# Patient Record
Sex: MALE | Race: WHITE | NOT HISPANIC OR LATINO | Employment: OTHER | URBAN - METROPOLITAN AREA
[De-identification: names, ages, dates, MRNs, and addresses within clinical notes are randomized per-mention and may not be internally consistent; named-entity substitution may affect disease eponyms.]

---

## 2022-07-14 ENCOUNTER — HOSPITAL ENCOUNTER (INPATIENT)
Facility: HOSPITAL | Age: 82
LOS: 4 days | Discharge: NURSING, REHAB OR LONG-TERM CARE | DRG: 176 | End: 2022-07-18
Attending: EMERGENCY MEDICINE | Admitting: INTERNAL MEDICINE
Payer: MEDICARE

## 2022-07-14 DIAGNOSIS — R79.89 ELEVATED TROPONIN: ICD-10-CM

## 2022-07-14 DIAGNOSIS — R09.02 HYPOXIA: ICD-10-CM

## 2022-07-14 DIAGNOSIS — R07.9 CHEST PAIN: ICD-10-CM

## 2022-07-14 DIAGNOSIS — I26.99 BILATERAL PULMONARY EMBOLISM: ICD-10-CM

## 2022-07-14 DIAGNOSIS — I49.9 DYSRHYTHMIA: ICD-10-CM

## 2022-07-14 DIAGNOSIS — R00.0 TACHYCARDIA WITH GREATER THAN 160 BEATS PER MINUTE: ICD-10-CM

## 2022-07-14 DIAGNOSIS — I26.99 ACUTE PULMONARY EMBOLISM WITHOUT ACUTE COR PULMONALE, UNSPECIFIED PULMONARY EMBOLISM TYPE: Primary | ICD-10-CM

## 2022-07-14 DIAGNOSIS — R00.0 TACHYCARDIA: ICD-10-CM

## 2022-07-14 PROBLEM — N40.0 BPH (BENIGN PROSTATIC HYPERPLASIA): Status: ACTIVE | Noted: 2022-07-14

## 2022-07-14 PROBLEM — I10 ESSENTIAL HYPERTENSION: Status: ACTIVE | Noted: 2022-07-14

## 2022-07-14 PROBLEM — I47.10 SVT (SUPRAVENTRICULAR TACHYCARDIA): Status: ACTIVE | Noted: 2022-07-14

## 2022-07-14 PROBLEM — F02.80 ALZHEIMER'S DEMENTIA: Status: ACTIVE | Noted: 2022-07-14

## 2022-07-14 PROBLEM — G30.9 ALZHEIMER'S DEMENTIA: Status: ACTIVE | Noted: 2022-07-14

## 2022-07-14 LAB
ALBUMIN SERPL BCP-MCNC: 3 G/DL (ref 3.5–5.2)
ALP SERPL-CCNC: 83 U/L (ref 55–135)
ALT SERPL W/O P-5'-P-CCNC: 20 U/L (ref 10–44)
ANION GAP SERPL CALC-SCNC: 12 MMOL/L (ref 8–16)
APTT BLDCRRT: 25.1 SEC (ref 21–32)
AST SERPL-CCNC: 21 U/L (ref 10–40)
BASOPHILS # BLD AUTO: 0.07 K/UL (ref 0–0.2)
BASOPHILS # BLD AUTO: 0.07 K/UL (ref 0–0.2)
BASOPHILS NFR BLD: 0.5 % (ref 0–1.9)
BASOPHILS NFR BLD: 0.5 % (ref 0–1.9)
BILIRUB SERPL-MCNC: 1.3 MG/DL (ref 0.1–1)
BILIRUB UR QL STRIP: NEGATIVE
BNP SERPL-MCNC: 151 PG/ML (ref 0–99)
BUN SERPL-MCNC: 11 MG/DL (ref 8–23)
CALCIUM SERPL-MCNC: 9.3 MG/DL (ref 8.7–10.5)
CHLORIDE SERPL-SCNC: 91 MMOL/L (ref 95–110)
CLARITY UR: CLEAR
CO2 SERPL-SCNC: 27 MMOL/L (ref 23–29)
COLOR UR: YELLOW
CREAT SERPL-MCNC: 0.7 MG/DL (ref 0.5–1.4)
CTP QC/QA: YES
DIFFERENTIAL METHOD: ABNORMAL
DIFFERENTIAL METHOD: ABNORMAL
EOSINOPHIL # BLD AUTO: 0.3 K/UL (ref 0–0.5)
EOSINOPHIL # BLD AUTO: 0.3 K/UL (ref 0–0.5)
EOSINOPHIL NFR BLD: 2.1 % (ref 0–8)
EOSINOPHIL NFR BLD: 2.2 % (ref 0–8)
ERYTHROCYTE [DISTWIDTH] IN BLOOD BY AUTOMATED COUNT: 13.8 % (ref 11.5–14.5)
ERYTHROCYTE [DISTWIDTH] IN BLOOD BY AUTOMATED COUNT: 14.1 % (ref 11.5–14.5)
EST. GFR  (AFRICAN AMERICAN): >60 ML/MIN/1.73 M^2
EST. GFR  (NON AFRICAN AMERICAN): >60 ML/MIN/1.73 M^2
GLUCOSE SERPL-MCNC: 110 MG/DL (ref 70–110)
GLUCOSE UR QL STRIP: NEGATIVE
HCT VFR BLD AUTO: 38.1 % (ref 40–54)
HCT VFR BLD AUTO: 40.8 % (ref 40–54)
HGB BLD-MCNC: 12.9 G/DL (ref 14–18)
HGB BLD-MCNC: 13.7 G/DL (ref 14–18)
HGB UR QL STRIP: NEGATIVE
IMM GRANULOCYTES # BLD AUTO: 0.05 K/UL (ref 0–0.04)
IMM GRANULOCYTES # BLD AUTO: 0.07 K/UL (ref 0–0.04)
IMM GRANULOCYTES NFR BLD AUTO: 0.4 % (ref 0–0.5)
IMM GRANULOCYTES NFR BLD AUTO: 0.5 % (ref 0–0.5)
INFLUENZA A, MOLECULAR: NEGATIVE
INFLUENZA B, MOLECULAR: NEGATIVE
INR PPP: 1.1 (ref 0.8–1.2)
KETONES UR QL STRIP: NEGATIVE
LACTATE SERPL-SCNC: 0.9 MMOL/L (ref 0.5–2.2)
LACTATE SERPL-SCNC: 1 MMOL/L (ref 0.5–2.2)
LEUKOCYTE ESTERASE UR QL STRIP: ABNORMAL
LYMPHOCYTES # BLD AUTO: 1 K/UL (ref 1–4.8)
LYMPHOCYTES # BLD AUTO: 1 K/UL (ref 1–4.8)
LYMPHOCYTES NFR BLD: 7.5 % (ref 18–48)
LYMPHOCYTES NFR BLD: 7.9 % (ref 18–48)
MCH RBC QN AUTO: 29.9 PG (ref 27–31)
MCH RBC QN AUTO: 30.1 PG (ref 27–31)
MCHC RBC AUTO-ENTMCNC: 33.6 G/DL (ref 32–36)
MCHC RBC AUTO-ENTMCNC: 33.9 G/DL (ref 32–36)
MCV RBC AUTO: 89 FL (ref 82–98)
MCV RBC AUTO: 89 FL (ref 82–98)
MICROSCOPIC COMMENT: ABNORMAL
MONOCYTES # BLD AUTO: 1.1 K/UL (ref 0.3–1)
MONOCYTES # BLD AUTO: 1.2 K/UL (ref 0.3–1)
MONOCYTES NFR BLD: 8.6 % (ref 4–15)
MONOCYTES NFR BLD: 9.5 % (ref 4–15)
NEUTROPHILS # BLD AUTO: 10.3 K/UL (ref 1.8–7.7)
NEUTROPHILS # BLD AUTO: 10.6 K/UL (ref 1.8–7.7)
NEUTROPHILS NFR BLD: 79.5 % (ref 38–73)
NEUTROPHILS NFR BLD: 80.8 % (ref 38–73)
NITRITE UR QL STRIP: NEGATIVE
NRBC BLD-RTO: 0 /100 WBC
NRBC BLD-RTO: 0 /100 WBC
PH UR STRIP: 7 [PH] (ref 5–8)
PLATELET # BLD AUTO: 204 K/UL (ref 150–450)
PLATELET # BLD AUTO: 206 K/UL (ref 150–450)
PMV BLD AUTO: 10.6 FL (ref 9.2–12.9)
PMV BLD AUTO: 10.7 FL (ref 9.2–12.9)
POTASSIUM SERPL-SCNC: 4.7 MMOL/L (ref 3.5–5.1)
PROCALCITONIN SERPL IA-MCNC: 0.11 NG/ML
PROT SERPL-MCNC: 6.6 G/DL (ref 6–8.4)
PROT UR QL STRIP: ABNORMAL
PROTHROMBIN TIME: 11.6 SEC (ref 9–12.5)
RBC # BLD AUTO: 4.28 M/UL (ref 4.6–6.2)
RBC # BLD AUTO: 4.58 M/UL (ref 4.6–6.2)
RBC #/AREA URNS HPF: 2 /HPF (ref 0–4)
SARS-COV-2 RDRP RESP QL NAA+PROBE: NEGATIVE
SODIUM SERPL-SCNC: 130 MMOL/L (ref 136–145)
SP GR UR STRIP: 1.02 (ref 1–1.03)
SPECIMEN SOURCE: NORMAL
TROPONIN I SERPL DL<=0.01 NG/ML-MCNC: 0.07 NG/ML (ref 0–0.03)
TROPONIN I SERPL DL<=0.01 NG/ML-MCNC: 0.1 NG/ML (ref 0–0.03)
URN SPEC COLLECT METH UR: ABNORMAL
UROBILINOGEN UR STRIP-ACNC: ABNORMAL EU/DL
WBC # BLD AUTO: 12.98 K/UL (ref 3.9–12.7)
WBC # BLD AUTO: 13.1 K/UL (ref 3.9–12.7)
WBC #/AREA URNS HPF: 10 /HPF (ref 0–5)

## 2022-07-14 PROCEDURE — 36415 COLL VENOUS BLD VENIPUNCTURE: CPT | Performed by: NURSE PRACTITIONER

## 2022-07-14 PROCEDURE — 21400001 HC TELEMETRY ROOM

## 2022-07-14 PROCEDURE — 83605 ASSAY OF LACTIC ACID: CPT | Performed by: EMERGENCY MEDICINE

## 2022-07-14 PROCEDURE — 93010 EKG 12-LEAD: ICD-10-PCS | Mod: ,,, | Performed by: INTERNAL MEDICINE

## 2022-07-14 PROCEDURE — 80053 COMPREHEN METABOLIC PANEL: CPT | Performed by: EMERGENCY MEDICINE

## 2022-07-14 PROCEDURE — 87502 INFLUENZA DNA AMP PROBE: CPT | Performed by: EMERGENCY MEDICINE

## 2022-07-14 PROCEDURE — 27000221 HC OXYGEN, UP TO 24 HOURS

## 2022-07-14 PROCEDURE — 81000 URINALYSIS NONAUTO W/SCOPE: CPT | Performed by: EMERGENCY MEDICINE

## 2022-07-14 PROCEDURE — 84484 ASSAY OF TROPONIN QUANT: CPT | Mod: 91 | Performed by: NURSE PRACTITIONER

## 2022-07-14 PROCEDURE — 87040 BLOOD CULTURE FOR BACTERIA: CPT | Mod: 59 | Performed by: EMERGENCY MEDICINE

## 2022-07-14 PROCEDURE — 87502 INFLUENZA DNA AMP PROBE: CPT

## 2022-07-14 PROCEDURE — 85610 PROTHROMBIN TIME: CPT | Performed by: EMERGENCY MEDICINE

## 2022-07-14 PROCEDURE — 83880 ASSAY OF NATRIURETIC PEPTIDE: CPT | Performed by: EMERGENCY MEDICINE

## 2022-07-14 PROCEDURE — 25000003 PHARM REV CODE 250: Performed by: EMERGENCY MEDICINE

## 2022-07-14 PROCEDURE — 25000003 PHARM REV CODE 250: Performed by: NURSE PRACTITIONER

## 2022-07-14 PROCEDURE — U0002 COVID-19 LAB TEST NON-CDC: HCPCS | Performed by: EMERGENCY MEDICINE

## 2022-07-14 PROCEDURE — 63600175 PHARM REV CODE 636 W HCPCS: Performed by: EMERGENCY MEDICINE

## 2022-07-14 PROCEDURE — 85025 COMPLETE CBC W/AUTO DIFF WBC: CPT | Performed by: EMERGENCY MEDICINE

## 2022-07-14 PROCEDURE — 93010 ELECTROCARDIOGRAM REPORT: CPT | Mod: 76,,, | Performed by: INTERNAL MEDICINE

## 2022-07-14 PROCEDURE — 94761 N-INVAS EAR/PLS OXIMETRY MLT: CPT

## 2022-07-14 PROCEDURE — 84145 PROCALCITONIN (PCT): CPT | Performed by: EMERGENCY MEDICINE

## 2022-07-14 PROCEDURE — 36415 COLL VENOUS BLD VENIPUNCTURE: CPT | Performed by: EMERGENCY MEDICINE

## 2022-07-14 PROCEDURE — 96374 THER/PROPH/DIAG INJ IV PUSH: CPT

## 2022-07-14 PROCEDURE — 99291 CRITICAL CARE FIRST HOUR: CPT | Mod: 25

## 2022-07-14 PROCEDURE — 93005 ELECTROCARDIOGRAM TRACING: CPT

## 2022-07-14 PROCEDURE — 85025 COMPLETE CBC W/AUTO DIFF WBC: CPT | Mod: 91 | Performed by: EMERGENCY MEDICINE

## 2022-07-14 PROCEDURE — 99900035 HC TECH TIME PER 15 MIN (STAT)

## 2022-07-14 PROCEDURE — 25500020 PHARM REV CODE 255: Performed by: EMERGENCY MEDICINE

## 2022-07-14 PROCEDURE — A4216 STERILE WATER/SALINE, 10 ML: HCPCS | Performed by: NURSE PRACTITIONER

## 2022-07-14 PROCEDURE — 84484 ASSAY OF TROPONIN QUANT: CPT | Performed by: EMERGENCY MEDICINE

## 2022-07-14 PROCEDURE — 85730 THROMBOPLASTIN TIME PARTIAL: CPT | Performed by: EMERGENCY MEDICINE

## 2022-07-14 PROCEDURE — 93010 ELECTROCARDIOGRAM REPORT: CPT | Mod: ,,, | Performed by: INTERNAL MEDICINE

## 2022-07-14 RX ORDER — FINASTERIDE 5 MG/1
5 TABLET, FILM COATED ORAL DAILY
Status: DISCONTINUED | OUTPATIENT
Start: 2022-07-15 | End: 2022-07-18 | Stop reason: HOSPADM

## 2022-07-14 RX ORDER — ATORVASTATIN CALCIUM 10 MG/1
10 TABLET, FILM COATED ORAL NIGHTLY
COMMUNITY
Start: 2022-06-23

## 2022-07-14 RX ORDER — FOLIC ACID 1 MG/1
1 TABLET ORAL DAILY
COMMUNITY
Start: 2022-06-22

## 2022-07-14 RX ORDER — SIMETHICONE 80 MG
1 TABLET,CHEWABLE ORAL 4 TIMES DAILY PRN
Status: DISCONTINUED | OUTPATIENT
Start: 2022-07-14 | End: 2022-07-18 | Stop reason: HOSPADM

## 2022-07-14 RX ORDER — FINASTERIDE 5 MG/1
5 TABLET, FILM COATED ORAL DAILY
COMMUNITY
Start: 2022-06-23

## 2022-07-14 RX ORDER — DONEPEZIL HYDROCHLORIDE 5 MG/1
10 TABLET, FILM COATED ORAL NIGHTLY
Status: DISCONTINUED | OUTPATIENT
Start: 2022-07-14 | End: 2022-07-18

## 2022-07-14 RX ORDER — DOCUSATE SODIUM 100 MG/1
100 CAPSULE, LIQUID FILLED ORAL 2 TIMES DAILY
COMMUNITY

## 2022-07-14 RX ORDER — DONEPEZIL HYDROCHLORIDE 10 MG/1
10 TABLET, FILM COATED ORAL NIGHTLY
Status: ON HOLD | COMMUNITY
Start: 2022-04-25 | End: 2022-07-14

## 2022-07-14 RX ORDER — MEMANTINE HYDROCHLORIDE 10 MG/1
10 TABLET ORAL 2 TIMES DAILY
Status: ON HOLD | COMMUNITY
Start: 2022-02-22 | End: 2022-07-14

## 2022-07-14 RX ORDER — TAMSULOSIN HYDROCHLORIDE 0.4 MG/1
0.4 CAPSULE ORAL DAILY
Status: DISCONTINUED | OUTPATIENT
Start: 2022-07-15 | End: 2022-07-17

## 2022-07-14 RX ORDER — POLYETHYLENE GLYCOL 3350 17 G/17G
17 POWDER, FOR SOLUTION ORAL DAILY
COMMUNITY

## 2022-07-14 RX ORDER — NYSTATIN 100000 U/G
OINTMENT TOPICAL 2 TIMES DAILY
COMMUNITY
Start: 2022-07-07 | End: 2022-07-30

## 2022-07-14 RX ORDER — ADHESIVE BANDAGE
20 BANDAGE TOPICAL DAILY PRN
COMMUNITY

## 2022-07-14 RX ORDER — MAG HYDROX/ALUMINUM HYD/SIMETH 200-200-20
30 SUSPENSION, ORAL (FINAL DOSE FORM) ORAL 4 TIMES DAILY PRN
Status: DISCONTINUED | OUTPATIENT
Start: 2022-07-14 | End: 2022-07-18 | Stop reason: HOSPADM

## 2022-07-14 RX ORDER — OXYCODONE HYDROCHLORIDE 5 MG/1
5 TABLET ORAL EVERY 6 HOURS PRN
Status: DISCONTINUED | OUTPATIENT
Start: 2022-07-14 | End: 2022-07-18 | Stop reason: HOSPADM

## 2022-07-14 RX ORDER — MORPHINE SULFATE 4 MG/ML
4 INJECTION, SOLUTION INTRAMUSCULAR; INTRAVENOUS EVERY 4 HOURS PRN
Status: DISCONTINUED | OUTPATIENT
Start: 2022-07-14 | End: 2022-07-18 | Stop reason: HOSPADM

## 2022-07-14 RX ORDER — POLYETHYLENE GLYCOL 3350 17 G/17G
17 POWDER, FOR SOLUTION ORAL DAILY
Status: DISCONTINUED | OUTPATIENT
Start: 2022-07-15 | End: 2022-07-16

## 2022-07-14 RX ORDER — ONDANSETRON 8 MG/1
8 TABLET, ORALLY DISINTEGRATING ORAL EVERY 8 HOURS PRN
Status: DISCONTINUED | OUTPATIENT
Start: 2022-07-14 | End: 2022-07-18 | Stop reason: HOSPADM

## 2022-07-14 RX ORDER — DILTIAZEM HYDROCHLORIDE 5 MG/ML
20 INJECTION INTRAVENOUS
Status: COMPLETED | OUTPATIENT
Start: 2022-07-14 | End: 2022-07-14

## 2022-07-14 RX ORDER — HEPARIN SODIUM,PORCINE/D5W 25000/250
0-40 INTRAVENOUS SOLUTION INTRAVENOUS CONTINUOUS
Status: DISCONTINUED | OUTPATIENT
Start: 2022-07-14 | End: 2022-07-15

## 2022-07-14 RX ORDER — ACETAMINOPHEN 325 MG/1
650 TABLET ORAL EVERY 8 HOURS PRN
Status: DISCONTINUED | OUTPATIENT
Start: 2022-07-14 | End: 2022-07-18 | Stop reason: HOSPADM

## 2022-07-14 RX ORDER — METOPROLOL TARTRATE 25 MG/1
12.5 TABLET ORAL 2 TIMES DAILY
Status: DISCONTINUED | OUTPATIENT
Start: 2022-07-14 | End: 2022-07-15

## 2022-07-14 RX ORDER — FOLIC ACID 1 MG/1
1 TABLET ORAL DAILY
Status: DISCONTINUED | OUTPATIENT
Start: 2022-07-15 | End: 2022-07-18 | Stop reason: HOSPADM

## 2022-07-14 RX ORDER — ACETAMINOPHEN 325 MG/1
650 TABLET ORAL EVERY 4 HOURS PRN
Status: DISCONTINUED | OUTPATIENT
Start: 2022-07-14 | End: 2022-07-18 | Stop reason: HOSPADM

## 2022-07-14 RX ORDER — TAMSULOSIN HYDROCHLORIDE 0.4 MG/1
0.4 CAPSULE ORAL DAILY
COMMUNITY
Start: 2022-06-23 | End: 2022-07-30

## 2022-07-14 RX ORDER — METOPROLOL TARTRATE 25 MG/1
12.5 TABLET, FILM COATED ORAL 2 TIMES DAILY
Status: ON HOLD | COMMUNITY
End: 2022-08-03 | Stop reason: SDUPTHER

## 2022-07-14 RX ORDER — MEMANTINE HYDROCHLORIDE 10 MG/1
10 TABLET ORAL 2 TIMES DAILY
Status: DISCONTINUED | OUTPATIENT
Start: 2022-07-14 | End: 2022-07-18

## 2022-07-14 RX ORDER — SODIUM CHLORIDE 0.9 % (FLUSH) 0.9 %
10 SYRINGE (ML) INJECTION EVERY 8 HOURS
Status: DISCONTINUED | OUTPATIENT
Start: 2022-07-14 | End: 2022-07-18 | Stop reason: HOSPADM

## 2022-07-14 RX ORDER — DOCUSATE SODIUM 100 MG/1
100 CAPSULE, LIQUID FILLED ORAL 2 TIMES DAILY
Status: DISCONTINUED | OUTPATIENT
Start: 2022-07-14 | End: 2022-07-18 | Stop reason: HOSPADM

## 2022-07-14 RX ORDER — NALOXONE HCL 0.4 MG/ML
0.02 VIAL (ML) INJECTION
Status: DISCONTINUED | OUTPATIENT
Start: 2022-07-14 | End: 2022-07-18 | Stop reason: HOSPADM

## 2022-07-14 RX ORDER — ATORVASTATIN CALCIUM 10 MG/1
10 TABLET, FILM COATED ORAL DAILY
Status: DISCONTINUED | OUTPATIENT
Start: 2022-07-15 | End: 2022-07-18 | Stop reason: HOSPADM

## 2022-07-14 RX ORDER — PROMETHAZINE HYDROCHLORIDE 25 MG/1
25 TABLET ORAL EVERY 6 HOURS PRN
Status: DISCONTINUED | OUTPATIENT
Start: 2022-07-14 | End: 2022-07-18 | Stop reason: HOSPADM

## 2022-07-14 RX ORDER — TALC
6 POWDER (GRAM) TOPICAL NIGHTLY PRN
Status: DISCONTINUED | OUTPATIENT
Start: 2022-07-14 | End: 2022-07-18 | Stop reason: HOSPADM

## 2022-07-14 RX ADMIN — DOCUSATE SODIUM 100 MG: 100 CAPSULE, LIQUID FILLED ORAL at 10:07

## 2022-07-14 RX ADMIN — IOHEXOL 100 ML: 350 INJECTION, SOLUTION INTRAVENOUS at 05:07

## 2022-07-14 RX ADMIN — Medication 10 ML: at 10:07

## 2022-07-14 RX ADMIN — METOPROLOL TARTRATE 12.5 MG: 25 TABLET, FILM COATED ORAL at 10:07

## 2022-07-14 RX ADMIN — DILTIAZEM HYDROCHLORIDE 20 MG: 5 INJECTION INTRAVENOUS at 05:07

## 2022-07-14 RX ADMIN — HEPARIN SODIUM 18 UNITS/KG/HR: 10000 INJECTION, SOLUTION INTRAVENOUS at 07:07

## 2022-07-14 NOTE — ED PROVIDER NOTES
SCRIBE #1 NOTE: I, Hortencia Preciado, am scribing for, and in the presence of, Prabhjot Joshua MD. I have scribed the HPI and ROS.     SCRIBE #2 NOTE: I, Jayson Mata, am scribing for, and in the presence of,  Denzel Sow Jr., MD. I have scribed the remaining portions of the note not scribed by Scribe #1.     History      Chief Complaint   Patient presents with    Fatigue     Pt. Presents to Ed via EMS due to having generalized  weakness for the past few days, and today home health found pt to have a low o2 stat of 82-83. Pt is 94 on 6lpm via NC        Review of patient's allergies indicates:  No Known Allergies     HPI   HPI    7/14/2022, 3:02 PM   History obtained from the patient      History of Present Illness: Riley Saba is a 82 y.o. male patient with a PMHx of Alzheimer's disease who presents to the Emergency Department for hypoxia. Per EMS, the pt's home health found the pt to have a SpO2 of 80% so they called EMS. EMS reports that the fire department put the pt on a non-rebreather and they put the pt on 4L of O2. En route to the ED, EMS reports that the pt's SpO2 increased 96% on 4L of O2. EMS also reports that the pt was intermittently hypotensive and tachycardic en route with the pt's lowest blood pressure being 94/50 and highest heart rate being in the 140s. EMS reports that they have been transporting the pt frequently in recent weeks for generalized weakness.      Arrival mode: EMS    PCP: Bj Clayton MD       Past Medical History:  No past medical history on file.    Past Surgical History:  No past surgical history on file.      Family History:  No family history on file.    Social History:  Social History     Tobacco Use    Smoking status: Not on file    Smokeless tobacco: Not on file   Substance and Sexual Activity    Alcohol use: Not on file    Drug use: Not on file    Sexual activity: Not on file       ROS   Review of Systems   Unable to perform ROS: Other (Alzhiemer's disease)      Physical Exam      Initial Vitals [07/14/22 1446]   BP Pulse Resp Temp SpO2   94/72 110 (!) 28 -- (!) 94 %      MAP       --          Physical Exam  Nursing Notes and Vital Signs Reviewed.  Constitutional: Patient is in no acute distress. Well-developed and well-nourished.  Head: Atraumatic. Normocephalic.  Eyes:  EOM intact.  No scleral icterus.  ENT: Mucous membranes are moist.  Nares clear   Neck:  Full ROM. No JVD.  Cardiovascular: Regular rate. Regular rhythm No murmurs, rubs, or gallops. Distal pulses are 2+ and symmetric  Pulmonary/Chest: No respiratory distress. Clear to auscultation bilaterally. No wheezing or rales.  Equal chest wall rise bilaterally  Abdominal: Soft and non-distended.  There is no tenderness.  No rebound, guarding, or rigidity. Good bowel sounds.  Genitourinary: No CVA tenderness.  No suprapubic tenderness  Musculoskeletal: Moves all extremities. No obvious deformities.  5 x 5 strength in all extremities   Skin: Warm and dry.  Neurological:  Alert, awake, and appropriate.  Normal speech.  No acute focal neurological deficits are appreciated.  Two through 12 intact bilaterally.  Psychiatric: Normal affect. Good eye contact. Appropriate in content.    ED Course    Critical Care    Date/Time: 7/14/2022 5:42 PM  Performed by: Denzel Sow Jr., MD  Authorized by: Denzel Sow Jr., MD   Direct patient critical care time: 15 minutes  Additional history critical care time: 5 minutes  Ordering / reviewing critical care time: 5 minutes  Documentation critical care time: 5 minutes  Consulting other physicians critical care time: 5 minutes  Total critical care time (exclusive of procedural time) : 35 minutes  Critical care time was exclusive of separately billable procedures and treating other patients and teaching time.  Critical care was necessary to treat or prevent imminent or life-threatening deterioration of the following conditions: pulmonary embolism.  Critical care was time spent  "personally by me on the following activities: blood draw for specimens, development of treatment plan with patient or surrogate, discussions with consultants, interpretation of cardiac output measurements, evaluation of patient's response to treatment, examination of patient, obtaining history from patient or surrogate, ordering and performing treatments and interventions, ordering and review of laboratory studies, ordering and review of radiographic studies, pulse oximetry, re-evaluation of patient's condition and review of old charts.        ED Vital Signs:  Vitals:    07/14/22 1446 07/14/22 1502 07/14/22 1509 07/14/22 1528   BP: 94/72   (!) 146/80   Pulse: 110 109     Resp: (!) 28      TempSrc: Oral      SpO2: (!) 94%  (!) 92% 95%   Weight:       Height:        07/14/22 1700 07/14/22 1730 07/14/22 1738 07/14/22 1743   BP: 133/85 (!) 169/88 (!) 155/82 (!) 155/82   Pulse: 104 (!) 172  (!) 169   Resp: (!) 21 (!) 31  (!) 26   TempSrc:       SpO2: 97% (!) 89%  (!) 93%   Weight:       Height:        07/14/22 1744 07/14/22 1755   BP: (!) 126/58    Pulse: (S) 101    Resp: (!) 26    TempSrc:     SpO2: (!) 92%    Weight:  73.6 kg (162 lb 4.1 oz)   Height:  6' 1" (1.854 m)       Abnormal Lab Results:  Labs Reviewed   CBC W/ AUTO DIFFERENTIAL - Abnormal; Notable for the following components:       Result Value    WBC 13.10 (*)     RBC 4.58 (*)     Hemoglobin 13.7 (*)     Gran # (ANC) 10.6 (*)     Immature Grans (Abs) 0.07 (*)     Mono # 1.1 (*)     Gran % 80.8 (*)     Lymph % 7.5 (*)     All other components within normal limits   COMPREHENSIVE METABOLIC PANEL - Abnormal; Notable for the following components:    Sodium 130 (*)     Chloride 91 (*)     Albumin 3.0 (*)     Total Bilirubin 1.3 (*)     All other components within normal limits   URINALYSIS, REFLEX TO URINE CULTURE - Abnormal; Notable for the following components:    Protein, UA Trace (*)     Urobilinogen, UA 2.0-3.0 (*)     Leukocytes, UA 2+ (*)     All other " components within normal limits    Narrative:     Specimen Source->Urine   B-TYPE NATRIURETIC PEPTIDE - Abnormal; Notable for the following components:     (*)     All other components within normal limits   TROPONIN I - Abnormal; Notable for the following components:    Troponin I 0.074 (*)     All other components within normal limits   URINALYSIS MICROSCOPIC - Abnormal; Notable for the following components:    WBC, UA 10 (*)     All other components within normal limits    Narrative:     Specimen Source->Urine   CBC W/ AUTO DIFFERENTIAL - Abnormal; Notable for the following components:    WBC 12.98 (*)     RBC 4.28 (*)     Hemoglobin 12.9 (*)     Hematocrit 38.1 (*)     Gran # (ANC) 10.3 (*)     Immature Grans (Abs) 0.05 (*)     Mono # 1.2 (*)     Gran % 79.5 (*)     Lymph % 7.9 (*)     All other components within normal limits    Narrative:     Draw baseline aPTT prior to starting the heparin bolus or  infusion  (if patient is on warfarin prior to heparin therapy)   INFLUENZA A & B BY MOLECULAR   CULTURE, BLOOD   CULTURE, BLOOD   LACTIC ACID, PLASMA   PROCALCITONIN   APTT    Narrative:     Draw baseline aPTT prior to starting the heparin bolus or  infusion  (if patient is on warfarin prior to heparin therapy)   PROTIME-INR    Narrative:     Draw baseline aPTT prior to starting the heparin bolus or  infusion  (if patient is on warfarin prior to heparin therapy)   LACTIC ACID, PLASMA   SARS-COV-2 RDRP GENE    Narrative:     This test utilizes isothermal nucleic acid amplification   technology to detect the SARS-CoV-2 RdRp nucleic acid segment.   The analytical sensitivity (limit of detection) is 125 genome   equivalents/mL.   A POSITIVE result implies infection with the SARS-CoV-2 virus;   the patient is presumed to be contagious.     A NEGATIVE result means that SARS-CoV-2 nucleic acids are not   present above the limit of detection. A NEGATIVE result should be   treated as presumptive. It does not rule  out the possibility of   COVID-19 and should not be the sole basis for treatment decisions.   If COVID-19 is strongly suspected based on clinical and exposure   history, re-testing using an alternate molecular assay should be   considered.   This test is only for use under the Food and Drug   Administration s Emergency Use Authorization (EUA).   Commercial kits are provided by [employer].   Performance characteristics of the EUA have been independently   verified by [facility] Department of   Pathology and Laboratory Medicine.   _________________________________________________________________   The authorized Fact Sheet for Healthcare Providers and the authorized Fact   Sheet for Patients of the ID NOW COVID-19 are available on the FDA   website:     https://www.fda.gov/media/806281/download  https://www.fda.gov/media/871506/download               All Lab Results:  Results for orders placed or performed during the hospital encounter of 07/14/22   Influenza A & B by Molecular    Specimen: Nasopharyngeal Swab   Result Value Ref Range    Influenza A, Molecular Negative Negative    Influenza B, Molecular Negative Negative    Flu A & B Source Nasal swab    CBC auto differential   Result Value Ref Range    WBC 13.10 (H) 3.90 - 12.70 K/uL    RBC 4.58 (L) 4.60 - 6.20 M/uL    Hemoglobin 13.7 (L) 14.0 - 18.0 g/dL    Hematocrit 40.8 40.0 - 54.0 %    MCV 89 82 - 98 fL    MCH 29.9 27.0 - 31.0 pg    MCHC 33.6 32.0 - 36.0 g/dL    RDW 14.1 11.5 - 14.5 %    Platelets 206 150 - 450 K/uL    MPV 10.6 9.2 - 12.9 fL    Immature Granulocytes 0.5 0.0 - 0.5 %    Gran # (ANC) 10.6 (H) 1.8 - 7.7 K/uL    Immature Grans (Abs) 0.07 (H) 0.00 - 0.04 K/uL    Lymph # 1.0 1.0 - 4.8 K/uL    Mono # 1.1 (H) 0.3 - 1.0 K/uL    Eos # 0.3 0.0 - 0.5 K/uL    Baso # 0.07 0.00 - 0.20 K/uL    nRBC 0 0 /100 WBC    Gran % 80.8 (H) 38.0 - 73.0 %    Lymph % 7.5 (L) 18.0 - 48.0 %    Mono % 8.6 4.0 - 15.0 %    Eosinophil % 2.1 0.0 - 8.0 %     Basophil % 0.5 0.0 - 1.9 %    Differential Method Automated    Comprehensive metabolic panel   Result Value Ref Range    Sodium 130 (L) 136 - 145 mmol/L    Potassium 4.7 3.5 - 5.1 mmol/L    Chloride 91 (L) 95 - 110 mmol/L    CO2 27 23 - 29 mmol/L    Glucose 110 70 - 110 mg/dL    BUN 11 8 - 23 mg/dL    Creatinine 0.7 0.5 - 1.4 mg/dL    Calcium 9.3 8.7 - 10.5 mg/dL    Total Protein 6.6 6.0 - 8.4 g/dL    Albumin 3.0 (L) 3.5 - 5.2 g/dL    Total Bilirubin 1.3 (H) 0.1 - 1.0 mg/dL    Alkaline Phosphatase 83 55 - 135 U/L    AST 21 10 - 40 U/L    ALT 20 10 - 44 U/L    Anion Gap 12 8 - 16 mmol/L    eGFR if African American >60 >60 mL/min/1.73 m^2    eGFR if non African American >60 >60 mL/min/1.73 m^2   Lactic acid, plasma #1   Result Value Ref Range    Lactate (Lactic Acid) 0.9 0.5 - 2.2 mmol/L   Urinalysis, Reflex to Urine Culture Urine, Clean Catch    Specimen: Urine   Result Value Ref Range    Specimen UA Urine, Catheterized     Color, UA Yellow Yellow, Straw, Yamilet    Appearance, UA Clear Clear    pH, UA 7.0 5.0 - 8.0    Specific Gravity, UA 1.020 1.005 - 1.030    Protein, UA Trace (A) Negative    Glucose, UA Negative Negative    Ketones, UA Negative Negative    Bilirubin (UA) Negative Negative    Occult Blood UA Negative Negative    Nitrite, UA Negative Negative    Urobilinogen, UA 2.0-3.0 (A) <2.0 EU/dL    Leukocytes, UA 2+ (A) Negative   Brain natriuretic peptide   Result Value Ref Range     (H) 0 - 99 pg/mL   Troponin I   Result Value Ref Range    Troponin I 0.074 (H) 0.000 - 0.026 ng/mL   Procalcitonin   Result Value Ref Range    Procalcitonin 0.11 <0.25 ng/mL   Urinalysis Microscopic   Result Value Ref Range    RBC, UA 2 0 - 4 /hpf    WBC, UA 10 (H) 0 - 5 /hpf    Microscopic Comment SEE COMMENT    APTT   Result Value Ref Range    aPTT 25.1 21.0 - 32.0 sec   Protime-INR   Result Value Ref Range    Prothrombin Time 11.6 9.0 - 12.5 sec    INR 1.1 0.8 - 1.2   CBC auto differential   Result Value Ref Range    WBC  12.98 (H) 3.90 - 12.70 K/uL    RBC 4.28 (L) 4.60 - 6.20 M/uL    Hemoglobin 12.9 (L) 14.0 - 18.0 g/dL    Hematocrit 38.1 (L) 40.0 - 54.0 %    MCV 89 82 - 98 fL    MCH 30.1 27.0 - 31.0 pg    MCHC 33.9 32.0 - 36.0 g/dL    RDW 13.8 11.5 - 14.5 %    Platelets 204 150 - 450 K/uL    MPV 10.7 9.2 - 12.9 fL    Immature Granulocytes 0.4 0.0 - 0.5 %    Gran # (ANC) 10.3 (H) 1.8 - 7.7 K/uL    Immature Grans (Abs) 0.05 (H) 0.00 - 0.04 K/uL    Lymph # 1.0 1.0 - 4.8 K/uL    Mono # 1.2 (H) 0.3 - 1.0 K/uL    Eos # 0.3 0.0 - 0.5 K/uL    Baso # 0.07 0.00 - 0.20 K/uL    nRBC 0 0 /100 WBC    Gran % 79.5 (H) 38.0 - 73.0 %    Lymph % 7.9 (L) 18.0 - 48.0 %    Mono % 9.5 4.0 - 15.0 %    Eosinophil % 2.2 0.0 - 8.0 %    Basophil % 0.5 0.0 - 1.9 %    Differential Method Automated    POCT COVID-19 Rapid Screening   Result Value Ref Range    POC Rapid COVID Negative Negative     Acceptable Yes          Imaging Results:  Imaging Results          CTA Chest Non-Coronary - PE Study (Final result)  Result time 07/14/22 17:34:34    Final result by Jonah Powers MD (07/14/22 17:34:34)                 Impression:      Multifocal bilateral pulmonary emboli as above with findings concerning for right heart strain.  Consider consultation with interventional radiology for thrombolysis.    Moderate bilateral pleural effusions with associated compressive atelectasis.    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    Additional details as above.    COMMUNICATION  This critical result was discovered/received at 17:30.  The critical information above was relayed directly by me by telephone to Dr. Sow with the emergency department on 07/14/2022 at 17:34.    All CT scans at this facility are performed  using dose modulation techniques as appropriate to performed exam including the following:  automated exposure control; adjustment of mA and/or kV according to the patients size (this includes techniques or  standardized protocols for targeted exams where dose is matched to indication/reason for exam: i.e. extremities or head);  iterative reconstruction technique.      Electronically signed by: Jonah Gio  Date:    07/14/2022  Time:    17:34             Narrative:    EXAMINATION:  CTA CHEST NON CORONARY    CLINICAL HISTORY:  Pulmonary embolism (PE) suspected, unknown D-dimer;    TECHNIQUE:  Low dose axial images, sagittal and coronal reformations were obtained from the thoracic inlet to the lung bases following the IV administration of 100 mL of Omnipaque 350.  Contrast timing was optimized to evaluate the pulmonary arteries.  MIP images were performed.    COMPARISON:  Chest radiograph same date    FINDINGS:  Base of Neck: No significant abnormality.    Thoracic soft tissues: Unremarkable.    Aorta: Mild aortic atherosclerosis.    Heart: Heart is the upper limit of normal.  No pericardial effusion.  Coronary artery atherosclerosis is present.  Findings concerning for right heart strain with flattening of the intraventricular septum and enlargement of the right ventricle.    Pulmonary vasculature: Multifocal bilateral pulmonary thromboemboli in the left pulmonary artery extending into the lobar and segmental pulmonary arteries and on the right in the right upper lobe lobar pulmonary artery.  Additional segmental right middle lobe and right lower lobe thrombi    Zuleima/Mediastinum: No pathologic americo enlargement.    Airways: Patent.    Lungs/Pleura: Moderate bilateral pleural effusions with associated compressive atelectasis.    Esophagus: Unremarkable.    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  There is poststenotic dilation.    Bones: No acute fracture. No suspicious lytic or sclerotic lesions.  Osseous degenerative changes.                               X-Ray Chest AP Portable (Final result)  Result time 07/14/22 15:39:27    Final result by Braulio Schuler MD (07/14/22 15:39:27)                  Impression:      1. Small to moderate size right pleural effusion and small left pleural effusion.      Electronically signed by: Braulio Schuler  Date:    07/14/2022  Time:    15:39             Narrative:    EXAMINATION:  XR CHEST AP PORTABLE    CLINICAL HISTORY:  Sepsis;  fatigue.    COMPARISON:  None    FINDINGS:  Opacity right lung base with blunting the right costophrenic angle compatible with a small to moderate size right pleural effusion.  Small left pleural effusion also present with atelectasis or mild consolidation of the left lung base.  Mild cardiomegaly.  Mildly tortuous aorta with vascular calcification.  No significant bony findings.                               The EKG was ordered, reviewed, and independently interpreted by the ED provider.  Interpretation time: 14:57  Rate: 109 BPM  Rhythm: sinus tachycardia  Interpretation: Nonspecific ST and T wave abnormality. No STEMI.      The EKG was ordered, reviewed, and independently interpreted by the ED provider.  Interpretation time: 17:30  Rate: 167 BPM  Rhythm: sinus tachycardia  Interpretation: Nonspecific ST and T wave abnormality. No STEMI.         The Emergency Provider reviewed the vital signs and test results, which are outlined above.    ED Discussion     4:00 PM: Dr. Joshua transfers care of patient to Dr. Sow pending lab and radiology results.    6:00 PM: Discussed pt's case with Dr. Edmonds (Diagnostic Radiology) who recommends that pt be admitted to Hospital Medicine on heparin. Dr. Edmonds also recommends to get a pulmonary consult and echo to evaluate the right heart strain pattern. Dr. Edmonds also stated that thrombolysis may be performed tomorrow depending on the results of the consult and the echo.    6:13 PM: Discussed case with Jelena Phipps NP (Hospital Medicine). Dr. Cabrera agrees with current care and management of pt and accepts admission.   Admitting Service: Hospital Medicine  Admitting Physician:   Shermanma  Admit to: Obs Tele    6:15 PM: Re-evaluated pt. I have discussed test results, shared treatment plan, and the need for admission with patient and family at bedside. Pt and family express understanding at this time and agree with all information. All questions answered. Pt and family have no further questions or concerns at this time. Pt is ready for admit.           ED Medication(s):  Medications   heparin 25,000 units in dextrose 5% 250 mL (100 units/mL) infusion HIGH INTENSITY nomogram - [facility] (18 Units/kg/hr × 73.6 kg Intravenous New Bag 7/14/22 1909)   heparin 25,000 units in dextrose 5% (100 units/ml) IV bolus from bag - ADDITIONAL PRN BOLUS - 60 units/kg (has no administration in time range)   heparin 25,000 units in dextrose 5% (100 units/ml) IV bolus from bag - ADDITIONAL PRN BOLUS - 30 units/kg (has no administration in time range)   iohexoL (OMNIPAQUE 350) injection 100 mL (100 mLs Intravenous Given 7/14/22 1723)   diltiaZEM injection 20 mg (20 mg Intravenous Given 7/14/22 1738)   heparin 25,000 units in dextrose 5% (100 units/ml) IV bolus from bag INITIAL BOLUS (5,888 Units Intravenous Bolus from Bag 7/14/22 1909)          New Prescriptions    No medications on file           Medical Decision Making    Medical Decision Making:   Clinical Tests:   Lab Tests: Ordered and Reviewed  Radiological Study: Ordered and Reviewed  Medical Tests: Ordered and Reviewed           Scribe Attestation:   Scribe #1: I performed the above scribed service and the documentation accurately describes the services I performed. I attest to the accuracy of the note.    Attending:   Physician Attestation Statement for Scribe #1: I, Prabhjot Joshua MD, personally performed the services described in this documentation, as scribed by Hortencia Preciado, in my presence, and it is both accurate and complete.       Scribe Attestation:   Scribe #2: I performed the above scribed service and the documentation accurately describes the services  I performed. I attest to the accuracy of the note.    Attending Attestation:           Physician Attestation for Scribe:    Physician Attestation Statement for Scribe #2: I, Denzel Sow Jr., MD, reviewed documentation, as scribed by Jayson Mata in my presence, and it is both accurate and complete. I also acknowledge and confirm the content of the note done by Scribe #1.          Clinical Impression       ICD-10-CM ICD-9-CM   1. Hypoxia  R09.02 799.02   2. Tachycardia  R00.0 785.0   3. Dysrhythmia  I49.9 427.9   4. Bilateral pulmonary embolism  I26.99 415.19       Disposition:   Disposition: Placed in Observation  Condition: Fair         Denzel Sow Jr., MD  07/14/22 1918

## 2022-07-14 NOTE — PHARMACY MED REC
"Admission Medication History     The home medication history was taken by Steve Pino.    You may go to "Admission" then "Reconcile Home Medications" tabs to review and/or act upon these items.      The home medication list has been updated by the Pharmacy department.    Please read ALL comments highlighted in yellow.    Please address this information as you see fit.     Feel free to contact us if you have any questions or require assistance.      Medications listed below were obtained from: Patient/family, Analytic software- [employer] [facility] and Patient's pharmacy  (Not in a hospital admission)      Potential issues to be addressed PRIOR TO DISCHARGE: NONE      Steve Pino, [facility]  Pharmacy Technician Specialist-Medication History  [facility] [phone]  Secure chat preferred       Current Outpatient Medications on File Prior to Encounter   Medication Sig Dispense Refill Last Dose    atorvastatin (LIPITOR) 10 MG tablet Take 10 mg by mouth once daily.   7/13/2022 at Unknown time    docusate sodium (COLACE) 100 MG capsule Take 100 mg by mouth 2 (two) times daily.   7/14/2022 at Unknown time    donepeziL (ARICEPT) 10 MG tablet Take 10 mg by mouth nightly.   Past Month at Monday 06/06/2022    finasteride (PROSCAR) 5 mg tablet Take 5 mg by mouth once daily.   7/14/2022 at Unknown time    folic acid (FOLVITE) 1 MG tablet Take 1 mg by mouth once daily.   7/14/2022 at Unknown time    magnesium hydroxide 400 mg/5 ml (MILK OF MAGNESIA) 400 mg/5 mL Susp Take 20 mLs by mouth daily as needed.   Past Week at Tuesday, 07/12/2022    memantine (NAMENDA) 10 MG Tab Take 10 mg by mouth 2 (two) times daily.   Past Month at Momday 06/06/22    metoprolol tartrate (LOPRESSOR) 25 MG tablet Take 12.5 mg by mouth 2 (two) times daily.   7/14/2022 at Unknown time    nystatin (MYCOSTATIN) ointment Apply topically 2 (two) times daily.   7/13/2022 at Unknown time    polyethylene glycol (GLYCOLAX) 17 gram/dose powder Take 17 g by mouth " once daily.   7/13/2022 at Unknown time    tamsulosin (FLOMAX) 0.4 mg Cap Take 0.4 mg by mouth once daily.   7/14/2022 at Unknown time                             .

## 2022-07-15 PROBLEM — I82.403 ACUTE DEEP VEIN THROMBOSIS (DVT) OF BOTH LOWER EXTREMITIES: Status: ACTIVE | Noted: 2022-07-15

## 2022-07-15 PROBLEM — N39.0 UTI (URINARY TRACT INFECTION): Status: ACTIVE | Noted: 2022-07-15

## 2022-07-15 PROBLEM — D72.829 LEUKOCYTOSIS: Status: ACTIVE | Noted: 2022-07-15

## 2022-07-15 LAB
ANION GAP SERPL CALC-SCNC: 9 MMOL/L (ref 8–16)
AORTIC ROOT ANNULUS: 3.79 CM
APTT BLDCRRT: 33.3 SEC (ref 21–32)
APTT BLDCRRT: 45.6 SEC (ref 21–32)
APTT BLDCRRT: 69.5 SEC (ref 21–32)
ASCENDING AORTA: 3.84 CM
AV INDEX (PROSTH): 0.76
AV MEAN GRADIENT: 7 MMHG
AV PEAK GRADIENT: 11 MMHG
AV VALVE AREA: 2.85 CM2
AV VELOCITY RATIO: 0.69
BSA FOR ECHO PROCEDURE: 1.9 M2
BUN SERPL-MCNC: 9 MG/DL (ref 8–23)
CALCIUM SERPL-MCNC: 8.8 MG/DL (ref 8.7–10.5)
CHLORIDE SERPL-SCNC: 98 MMOL/L (ref 95–110)
CO2 SERPL-SCNC: 23 MMOL/L (ref 23–29)
CREAT SERPL-MCNC: 0.6 MG/DL (ref 0.5–1.4)
CV ECHO LV RWT: 0.44 CM
DOP CALC AO PEAK VEL: 1.69 M/S
DOP CALC AO VTI: 25.7 CM
DOP CALC LVOT AREA: 3.7 CM2
DOP CALC LVOT DIAMETER: 2.18 CM
DOP CALC LVOT PEAK VEL: 1.17 M/S
DOP CALC LVOT STROKE VOLUME: 73.12 CM3
DOP CALC RVOT PEAK VEL: 0.89 M/S
DOP CALC RVOT VTI: 18.3 CM
DOP CALCLVOT PEAK VEL VTI: 19.6 CM
E WAVE DECELERATION TIME: 184.14 MSEC
E/A RATIO: 0.78
E/E' RATIO: 4.64 M/S
ECHO LV POSTERIOR WALL: 1.04 CM (ref 0.6–1.1)
EJECTION FRACTION: 60 %
EST. GFR  (AFRICAN AMERICAN): >60 ML/MIN/1.73 M^2
EST. GFR  (NON AFRICAN AMERICAN): >60 ML/MIN/1.73 M^2
FRACTIONAL SHORTENING: 34 % (ref 28–44)
GLUCOSE SERPL-MCNC: 111 MG/DL (ref 70–110)
INTERVENTRICULAR SEPTUM: 1.14 CM (ref 0.6–1.1)
IVRT: 105.61 MSEC
LA MAJOR: 2.94 CM
LA MINOR: 2.29 CM
LEFT ATRIUM SIZE: 2.82 CM
LEFT INTERNAL DIMENSION IN SYSTOLE: 3.12 CM (ref 2.1–4)
LEFT VENTRICLE DIASTOLIC VOLUME INDEX: 54.72 ML/M2
LEFT VENTRICLE DIASTOLIC VOLUME: 105.6 ML
LEFT VENTRICLE MASS INDEX: 98 G/M2
LEFT VENTRICLE SYSTOLIC VOLUME INDEX: 20 ML/M2
LEFT VENTRICLE SYSTOLIC VOLUME: 38.52 ML
LEFT VENTRICULAR INTERNAL DIMENSION IN DIASTOLE: 4.76 CM (ref 3.5–6)
LEFT VENTRICULAR MASS: 188.97 G
LV LATERAL E/E' RATIO: 3.63 M/S
LV SEPTAL E/E' RATIO: 6.44 M/S
LVOT MG: 2.54 MMHG
LVOT MV: 0.74 CM/S
MV PEAK A VEL: 0.74 M/S
MV PEAK E VEL: 0.58 M/S
MV STENOSIS PRESSURE HALF TIME: 53.4 MS
MV VALVE AREA P 1/2 METHOD: 4.12 CM2
PISA MRMAX VEL: 6.35 M/S
PISA TR MAX VEL: 3.92 M/S
POTASSIUM SERPL-SCNC: 4.1 MMOL/L (ref 3.5–5.1)
PV MEAN GRADIENT: 1.73 MMHG
RA MAJOR: 4.46 CM
RA WIDTH: 3.89 CM
SINUS: 3.75 CM
SODIUM SERPL-SCNC: 130 MMOL/L (ref 136–145)
STJ: 3.65 CM
TDI LATERAL: 0.16 M/S
TDI SEPTAL: 0.09 M/S
TDI: 0.13 M/S
TR MAX PG: 61 MMHG
TRICUSPID ANNULAR PLANE SYSTOLIC EXCURSION: 1.96 CM
TROPONIN I SERPL DL<=0.01 NG/ML-MCNC: 0.04 NG/ML (ref 0–0.03)
TROPONIN I SERPL DL<=0.01 NG/ML-MCNC: 0.06 NG/ML (ref 0–0.03)

## 2022-07-15 PROCEDURE — 25000003 PHARM REV CODE 250: Performed by: INTERNAL MEDICINE

## 2022-07-15 PROCEDURE — 85730 THROMBOPLASTIN TIME PARTIAL: CPT | Mod: 91 | Performed by: INTERNAL MEDICINE

## 2022-07-15 PROCEDURE — A4216 STERILE WATER/SALINE, 10 ML: HCPCS | Performed by: NURSE PRACTITIONER

## 2022-07-15 PROCEDURE — 84484 ASSAY OF TROPONIN QUANT: CPT | Performed by: NURSE PRACTITIONER

## 2022-07-15 PROCEDURE — 93010 EKG 12-LEAD: ICD-10-PCS | Mod: ,,, | Performed by: INTERNAL MEDICINE

## 2022-07-15 PROCEDURE — 21400001 HC TELEMETRY ROOM

## 2022-07-15 PROCEDURE — 63600175 PHARM REV CODE 636 W HCPCS: Performed by: INTERNAL MEDICINE

## 2022-07-15 PROCEDURE — 87086 URINE CULTURE/COLONY COUNT: CPT | Performed by: INTERNAL MEDICINE

## 2022-07-15 PROCEDURE — 27000221 HC OXYGEN, UP TO 24 HOURS

## 2022-07-15 PROCEDURE — 36415 COLL VENOUS BLD VENIPUNCTURE: CPT | Performed by: INTERNAL MEDICINE

## 2022-07-15 PROCEDURE — 94761 N-INVAS EAR/PLS OXIMETRY MLT: CPT

## 2022-07-15 PROCEDURE — 63600175 PHARM REV CODE 636 W HCPCS: Performed by: EMERGENCY MEDICINE

## 2022-07-15 PROCEDURE — 93005 ELECTROCARDIOGRAM TRACING: CPT

## 2022-07-15 PROCEDURE — 80048 BASIC METABOLIC PNL TOTAL CA: CPT | Performed by: NURSE PRACTITIONER

## 2022-07-15 PROCEDURE — 93010 ELECTROCARDIOGRAM REPORT: CPT | Mod: ,,, | Performed by: INTERNAL MEDICINE

## 2022-07-15 PROCEDURE — 36415 COLL VENOUS BLD VENIPUNCTURE: CPT | Performed by: EMERGENCY MEDICINE

## 2022-07-15 PROCEDURE — 25000003 PHARM REV CODE 250: Performed by: NURSE PRACTITIONER

## 2022-07-15 PROCEDURE — 92610 EVALUATE SWALLOWING FUNCTION: CPT

## 2022-07-15 PROCEDURE — 85730 THROMBOPLASTIN TIME PARTIAL: CPT | Performed by: EMERGENCY MEDICINE

## 2022-07-15 PROCEDURE — 85730 THROMBOPLASTIN TIME PARTIAL: CPT | Mod: 91 | Performed by: EMERGENCY MEDICINE

## 2022-07-15 RX ORDER — MUPIROCIN 20 MG/G
OINTMENT TOPICAL 2 TIMES DAILY
Status: DISCONTINUED | OUTPATIENT
Start: 2022-07-15 | End: 2022-07-18 | Stop reason: HOSPADM

## 2022-07-15 RX ORDER — LACTULOSE 10 G/15ML
20 SOLUTION ORAL DAILY PRN
Status: DISCONTINUED | OUTPATIENT
Start: 2022-07-15 | End: 2022-07-18 | Stop reason: HOSPADM

## 2022-07-15 RX ORDER — METOPROLOL TARTRATE 25 MG/1
25 TABLET, FILM COATED ORAL 2 TIMES DAILY
Status: DISCONTINUED | OUTPATIENT
Start: 2022-07-15 | End: 2022-07-18 | Stop reason: HOSPADM

## 2022-07-15 RX ORDER — MUPIROCIN 20 MG/G
OINTMENT TOPICAL 2 TIMES DAILY
Status: CANCELLED | OUTPATIENT
Start: 2022-07-15 | End: 2022-07-20

## 2022-07-15 RX ADMIN — APIXABAN 10 MG: 2.5 TABLET, FILM COATED ORAL at 11:07

## 2022-07-15 RX ADMIN — ATORVASTATIN CALCIUM 10 MG: 10 TABLET, FILM COATED ORAL at 09:07

## 2022-07-15 RX ADMIN — CEFTRIAXONE 1 G: 1 INJECTION, SOLUTION INTRAVENOUS at 03:07

## 2022-07-15 RX ADMIN — METOPROLOL TARTRATE 25 MG: 25 TABLET, FILM COATED ORAL at 10:07

## 2022-07-15 RX ADMIN — FOLIC ACID 1 MG: 1 TABLET ORAL at 09:07

## 2022-07-15 RX ADMIN — DOCUSATE SODIUM 100 MG: 100 CAPSULE, LIQUID FILLED ORAL at 09:07

## 2022-07-15 RX ADMIN — TAMSULOSIN HYDROCHLORIDE 0.4 MG: 0.4 CAPSULE ORAL at 09:07

## 2022-07-15 RX ADMIN — POLYETHYLENE GLYCOL 3350 17 G: 17 POWDER, FOR SOLUTION ORAL at 09:07

## 2022-07-15 RX ADMIN — Medication 10 ML: at 03:07

## 2022-07-15 RX ADMIN — DOCUSATE SODIUM 100 MG: 100 CAPSULE, LIQUID FILLED ORAL at 10:07

## 2022-07-15 RX ADMIN — MUPIROCIN: 20 OINTMENT TOPICAL at 10:07

## 2022-07-15 RX ADMIN — APIXABAN 10 MG: 2.5 TABLET, FILM COATED ORAL at 10:07

## 2022-07-15 RX ADMIN — Medication 10 ML: at 10:07

## 2022-07-15 RX ADMIN — METOPROLOL TARTRATE 12.5 MG: 25 TABLET, FILM COATED ORAL at 09:07

## 2022-07-15 RX ADMIN — FINASTERIDE 5 MG: 5 TABLET, FILM COATED ORAL at 09:07

## 2022-07-15 RX ADMIN — HEPARIN SODIUM 18 UNITS/KG/HR: 10000 INJECTION, SOLUTION INTRAVENOUS at 09:07

## 2022-07-15 NOTE — PT/OT/SLP EVAL
Speech Language Pathology Evaluation  Bedside Swallow    Patient Name:  Riley Saba   MRN:  [id number]  Admitting Diagnosis: Acute pulmonary embolism    Recommendations:                 General Recommendations:  Dysphagia therapy and pt/family education; ST diet f/u  Diet recommendations:  Puree (Pt/family requesting home diet of pureed solids.), Thin liquids - IDDSI Level 0 (NO STRAWS)   Aspiration Precautions: 1 bite/sip at a time, Eliminate distractions, Feed only when awake/alert, Frequent oral care, HOB to 90 degrees, Meds crushed in puree, Monitor for s/s of aspiration, No straws, Remain upright 30 minutes post meal and Strict aspiration precautions   General Precautions: Standard, aspiration, pureed diet  Communication strategies:  provide increased time to answer; hx dementia with progressive cognitive decline since 6/2022.    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    Social History: Patient lives with wife at home.  Pt with reported cognitive/functional decline since sx at [facility] 6/2022.  Pt requires assist with ADL's.  Wife is pt's primary caregiver.    Chest X-Rays: See medical chart.    Prior diet: Pt consumes a pureed diet/thin liquids at home.  Wife assists.    Subjective     Pt seen bedside for ST swallowing evaluation.  Wife present.  Pt confused with hx dementia.  No c/o pain.  Patient goals: To eat/drink.     Pain/Comfort:  · Pain Rating 1: 0/10  · Pain Rating Post-Intervention 1: 0/10  · Pain Rating 2: 0/10  · Pain Rating Post-Intervention 2: 0/10    Respiratory Status: nasal cannula    Objective:     Oral Musculature Evaluation  · Oral Musculature: general weakness  · Dentition: scattered dentition  · Secretion Management:  adequate  · Mucosal Quality: good  · Mandibular Strength and Mobility: impaired  · Oral Labial Strength and Mobility: impaired retraction, impaired pursing, impaired coordination (impaired coordination with straw-suck/swallow.)  · Lingual Strength and Mobility: impaired strength, impaired right lateral movement, impaired left lateral movement, impaired anterior elevation, impaired depression, functional protrusion  · Velar Elevation: WFL  · Buccal Strength and Mobility: decreased tone  · Volitional Cough: present, productive  · Volitional Swallow: present  · Voice Prior to PO Intake: WFL  · Oral Musculature Comments: Pt with severe cognitive deficits impacting ability to follow commands consistently.  hx dementia with functional decline since sx 6/2022.    Bedside Swallow Eval:   Consistencies Assessed:  · Pt consumed thin liquids (tsp/cup/straw), pureed and soft solids during bedside CSE.    Oral Phase:   · weak straw suck with impaired coordination of suck-swallow.  · Slow oral transit time    Pharyngeal Phase:   · decreased hyolaryngeal excursion to palpation  · delayed swallow initation   · Coughing/choking with straw use/thin liquids.    Compensatory Strategies  · None   · Pt unable to follow commands consistently or utilize strategies independently.    Treatment: Pt recommended for a pureed consistency diet (IDDSI 4) with thin liquids by tsp or cup (IDDSI 0), NO STRAWS, following strict aspiration precautions with feeding assist.    Assessment:     Riley Saba is an 82 y.o. male with an SLP diagnosis of Dysphagia and Cognitive-Linguistic Impairment s/t hx alzheimers dementia & functional decline over the past month.  Pt exhibiting s/s of dysphagia with thin liquids by straw and soft solids as stated above with progressive cognitive impairment negatively impacting safety/efficiency during PO intake.  Pt recommended for a pureed consistency diet (IDDSI 4) with thin liquids (IDDSI 0)--NO STRAWS, following  strict aspiration precautions and meal assist.  Medications recommended to be crushed and placed within pudding if able.   ST to f/u diet tolerance.    Goals:   Multidisciplinary Problems     SLP Goals        Problem: SLP    Goal Priority Disciplines Outcome   SLP Goal     SLP    Description: 1.  Pt will consume the least restrictive PO diet without s/s of dysphagia, given caregiver assist.  --Pt currently consuming pureed solids (IDDSI 4)/thin liquids (IDDSI 0), no straws with strict aspiration precautions.  2.  Rec: MBSS if clinically indicated s/p f/u diet tolerance.                   Plan:     · Patient to be seen:  2 x/week   · Plan of Care expires:  07/22/22  · Plan of Care reviewed with:  patient, spouse   · SLP Follow-Up:  Yes (ST to f/u diet tolerance.)       Discharge recommendations:  home with home health   Barriers to Discharge:  None    Time Tracking:     SLP Treatment Date:   07/15/22  Speech Start Time:  1330  Speech Stop Time:  1400     Speech Total Time (min):  30 min    Billable Minutes: Eval Swallow and Oral Function 30 minutes    07/15/2022

## 2022-07-15 NOTE — HPI
81 y/o male with PMHx of HTN, HLD, alzheimers, BPH, SVT, and  who presented to the ED with c/o hypoxia that onset gradually earlier today. Pt was found with O2 sats of 80% by Home Health. Pt does not use home oxygen. Pt was not appearing SOB or using accessory muscles.  Spouse/pt denies: fever, chills, cough, SOB, abd pain, BLE edema, and all other sx at this time.  ED workup shows: WBC 13K, H/H 13/38, , Trop. 0.074, Na 130, Bilirubin 1.3, u/a shows 2+ leukocytes, 10WBC. CTA chest shows: Multifocal bilateral pulmonary emboli as above with findings concerning for right heart strain.  Consider consultation with interventional radiology for thrombolysis.    Moderate bilateral pleural effusions with associated compressive atelectasis.  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  He is a full code and his SDM is his wife  He will be kept on OBS for bilat PE under the care of hospital medicine.

## 2022-07-15 NOTE — PROGRESS NOTES
[facility] - Telemetry ([facility])  [facility]  Progress Note    Patient Name: Riley Saba  MRN: [id number]  Patient Class: IP- Inpatient   Admission Date: 7/14/2022  Length of Stay: 1 days  Attending Physician: Heath Henderson MD  Primary Care Provider: Bj Clayton MD        Subjective:     Principal Problem:Acute pulmonary embolism        HPI:  83 y/o male with PMHx of HTN, HLD, alzheimers, BPH, SVT, and  who presented to the ED with c/o hypoxia that onset gradually earlier today. Pt was found with O2 sats of 80% by Home Health. Pt does not use home oxygen. Pt was not appearing SOB or using accessory muscles.  Spouse/pt denies: fever, chills, cough, SOB, abd pain, BLE edema, and all other sx at this time.  ED workup shows: WBC 13K, H/H 13/38, , Trop. 0.074, Na 130, Bilirubin 1.3, u/a shows 2+ leukocytes, 10WBC. CTA chest shows: Multifocal bilateral pulmonary emboli as above with findings concerning for right heart strain.  Consider consultation with interventional radiology for thrombolysis.    Moderate bilateral pleural effusions with associated compressive atelectasis.  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  He is a full code and his SDM is his wife  He will be kept on OBS for bilat PE under the care of hospital medicine.        Overview/Hospital Course:  Admitted for further evaluation of hypoxia and increased generalized weakness. Noted to have multifocal gloria pul emboli . Echo was reviewed by IR and suggested no Rt heart strain and IR thrombolysis was not indicated. Venous U/S gloria lower ext showed  DVT within the right superficial femoral vein and bilateral popliteal veins. UA with WBC 10/HPF with indwelling Gramajo catheter. Catheter was exchanged 10 days ago according to wife. Urine culture is requested.     7/15- Pt is awake , oriented to self and person only. H/O dementia. Appears ill and weakened. IR suggested no IR thrombolysis or thrombectomy  indicated. Heparin gtt transitioned to oral Apixaban. Rocephin initiated for UTI.         Interval History:   Echo was reviewed by IR and suggested no Rt heart strain and IR thrombolysis was not indicated. Venous U/S gloria lower ext showed  DVT within the right superficial femoral vein and bilateral popliteal veins      Review of Systems   Unable to perform ROS: Dementia   Objective:     Vital Signs (Most Recent):  Temp: 99.3 °F (37.4 °C) (07/15/22 1134)  Pulse: (!) 114 (07/15/22 1300)  Resp: 18 (07/15/22 1134)  BP: 124/63 (07/15/22 1134)  SpO2: 97 % (07/15/22 1134)   Vital Signs (24h Range):  Temp:  [97.3 °F (36.3 °C)-99.3 °F (37.4 °C)] 99.3 °F (37.4 °C)  Pulse:  [] 114  Resp:  [18-31] 18  SpO2:  [89 %-97 %] 97 %  BP: ()/() 124/63     Weight: 70.3 kg (154 lb 15.7 oz)  Body mass index is 20.45 kg/m².    Intake/Output Summary (Last 24 hours) at 7/15/2022 1444  Last data filed at 7/15/2022 0917  Gross per 24 hour   Intake 223.81 ml   Output 1600 ml   Net -1376.19 ml      Physical Exam  Vitals and nursing note reviewed.   Constitutional:       General: He is not in acute distress.     Appearance: He is well-developed. He is not diaphoretic.   HENT:      Head: Normocephalic and atraumatic.      Nose: Nose normal.   Eyes:      General:         Right eye: No discharge.         Left eye: No discharge.      Conjunctiva/sclera: Conjunctivae normal.      Pupils: Pupils are equal, round, and reactive to light.   Neck:      Thyroid: No thyromegaly.      Vascular: No JVD.      Trachea: No tracheal deviation.   Cardiovascular:      Rate and Rhythm: Normal rate and regular rhythm.      Heart sounds: Normal heart sounds. No murmur heard.    No friction rub. No gallop.   Pulmonary:      Effort: Pulmonary effort is normal. No respiratory distress.      Breath sounds: Normal breath sounds. No wheezing or rales.   Chest:      Chest wall: No tenderness.   Abdominal:      General: Bowel sounds are normal. There is no  distension.      Palpations: Abdomen is soft. There is no mass.      Tenderness: There is no abdominal tenderness.   Genitourinary:     Comments: Gramajo cath draining clear, yellow urine  Musculoskeletal:         General: No tenderness or deformity. Normal range of motion.      Cervical back: Normal range of motion and neck supple.   Skin:     General: Skin is warm and dry.      Capillary Refill: Capillary refill takes less than 2 seconds.      Findings: No erythema or rash.   Neurological:      Mental Status: He is alert.      Motor: Weakness present. No abnormal muscle tone.      Coordination: Coordination normal.      Comments: Oriented to self and person only    Psychiatric:         Behavior: Behavior normal.       Significant Labs: All pertinent labs within the past 24 hours have been reviewed.  BMP:   Recent Labs   Lab 07/15/22  0335   *   *   K 4.1   CL 98   CO2 23   BUN 9   CREATININE 0.6   CALCIUM 8.8     CBC:   Recent Labs   Lab 07/14/22  1512 07/14/22  1759   WBC 13.10* 12.98*   HGB 13.7* 12.9*   HCT 40.8 38.1*    204     CMP:   Recent Labs   Lab 07/14/22  1512 07/15/22  0335   * 130*   K 4.7 4.1   CL 91* 98   CO2 27 23    111*   BUN 11 9   CREATININE 0.7 0.6   CALCIUM 9.3 8.8   PROT 6.6  --    ALBUMIN 3.0*  --    BILITOT 1.3*  --    ALKPHOS 83  --    AST 21  --    ALT 20  --    ANIONGAP 12 9   EGFRNONAA >60 >60       Significant Imaging:       Assessment/Plan:      * Acute pulmonary embolism  --CTA chest shows Multifocal bilateral pulmonary emboli as above with findings concerning for right heart strain.    --heparin gtt  --IR consulted for possible thrombolysis  --- Likely provoked in the setting of recent surgery, multiple hospital admission and being sedentary   7/15-  Echo was reviewed by IR and suggested no Rt heart strain and IR thrombolysis was not indicated.  Heparin gtt transitioned to oral Apixaban       Acute deep vein thrombosis (DVT) of both lower  extremities  - Likely provoked in the setting of recent surgery, multiple hospital admission and being sedentary   -Heparin gtt transitioned to oral Apixaban       SVT (supraventricular tachycardia)  --Continue BB      Essential hypertension  -Monitor BP trend as wife reports orthostatic drop of systolic BP at home   -Orthostatic vitals   -Continue BB    Elevated troponin  --likely 2/2 PE  --pt denies CP  --2d echo pending  --trend troponin      BPH (benign prostatic hyperplasia)  --Has indwelling mendes at home since prostate surgery in 6/6/22  --Continue flomax and proscar  --F/U  OP with urology as scheduled      Alzheimer's dementia  --continue namenda, aricept  --supportive care      UTI (urinary tract infection)  - Pt with indwelling mendes catheter   -Catheter was exchanged 10 days ago according to wife   -UA showed WBC 10/HPF  -Urine culture requested   -Rocephin initiated       Leukocytosis  - Reactive vs infectious etiology   -Blood cultures x 2  -UA is suggestive of UTI. Urine culture requested   -Rocephin initiated to cover UTI        VTE Risk Mitigation (From admission, onward)         Ordered     apixaban tablet 10 mg  2 times daily         07/15/22 1032     IP VTE HIGH RISK PATIENT  Once         07/14/22 2056     Place sequential compression device  Until discontinued         07/14/22 2056     Reason for No Pharmacological VTE Prophylaxis  Once        Question:  Reasons:  Answer:  IV Heparin w/in 24 hrs. Pre or Post-Op    07/14/22 2056                Discharge Planning   ELIZABETH:      Code Status: Full Code   Is the patient medically ready for discharge?:     Reason for patient still in hospital (select all that apply): Patient trending condition  Discharge Plan A: Home Health                  Heath Henderson MD  Department of Hospital Medicine   [facility] - Telemetry ([facility])

## 2022-07-15 NOTE — ASSESSMENT & PLAN NOTE
--Has indwelling mendes at home since prostate surgery in 6/6/22  --Continue flomax and proscar  --F/U  OP with urology as scheduled

## 2022-07-15 NOTE — SUBJECTIVE & OBJECTIVE
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    Review of patient's allergies indicates:  No Known Allergies    No current facility-administered medications on file prior to encounter.     Current Outpatient Medications on File Prior to Encounter   Medication Sig    atorvastatin (LIPITOR) 10 MG tablet Take 10 mg by mouth once daily.    docusate sodium (COLACE) 100 MG capsule Take 100 mg by mouth 2 (two) times daily.    donepeziL (ARICEPT) 10 MG tablet Take 10 mg by mouth nightly.    finasteride (PROSCAR) 5 mg tablet Take 5 mg by mouth once daily.    folic acid (FOLVITE) 1 MG tablet Take 1 mg by mouth once daily.    magnesium hydroxide 400 mg/5 ml (MILK OF MAGNESIA) 400 mg/5 mL Susp Take 20 mLs by mouth daily as needed.    memantine (NAMENDA) 10 MG Tab Take 10 mg by mouth 2 (two) times daily.    metoprolol tartrate (LOPRESSOR) 25 MG tablet Take 12.5 mg by mouth 2 (two) times daily.    nystatin (MYCOSTATIN) ointment Apply topically 2 (two) times daily.    polyethylene glycol (GLYCOLAX) 17 gram/dose powder Take 17 g by mouth once daily.    tamsulosin (FLOMAX) 0.4 mg Cap Take 0.4 mg by mouth once daily.     Family History    None       Tobacco Use    Smoking status: Not on file    Smokeless tobacco: Not on file   Substance and Sexual Activity    Alcohol use: Not on file    Drug use: Not on file    Sexual activity: Not on file     Review of Systems   Constitutional:  Negative for activity change, appetite change, chills, fatigue and fever.   HENT:  Negative for dental problem, ear pain, hearing loss, mouth sores, nosebleeds, sinus pressure, sore throat, tinnitus and trouble swallowing.    Eyes:  Negative for pain, discharge and visual disturbance.   Respiratory:  Negative for cough,  choking, chest tightness, shortness of breath and wheezing.    Cardiovascular:  Negative for chest pain, palpitations and leg swelling.   Gastrointestinal:  Negative for abdominal distention, abdominal pain, anal bleeding, blood in stool, constipation, diarrhea, nausea and vomiting.   Endocrine: Negative for cold intolerance, heat intolerance, polydipsia, polyphagia and polyuria.   Genitourinary:  Negative for decreased urine volume, difficulty urinating, flank pain, frequency, hematuria and urgency.   Musculoskeletal:  Negative for arthralgias, back pain, gait problem, myalgias, neck pain and neck stiffness.   Skin:  Negative for color change, rash and wound.   Allergic/Immunologic: Negative.    Neurological:  Negative for dizziness, tremors, seizures, syncope, speech difficulty, weakness, light-headedness and headaches.   Hematological: Negative.    Psychiatric/Behavioral:  Negative for agitation, behavioral problems, confusion and sleep disturbance. The patient is not nervous/anxious.    All other systems reviewed and are negative.  Objective:     Vital Signs (Most Recent):  Temp: 97.7 °F (36.5 °C) (07/14/22 2039)  Pulse: 96 (07/14/22 2039)  Resp: 18 (07/14/22 2039)  BP: (!) 146/108 (07/14/22 2039)  SpO2: (!) 92 % (07/14/22 2039)   Vital Signs (24h Range):  Temp:  [97.7 °F (36.5 °C)] 97.7 °F (36.5 °C)  Pulse:  [] 96  Resp:  [18-31] 18  SpO2:  [89 %-97 %] 92 %  BP: ()/() 146/108     Weight: 64.7 kg (142 lb 10.2 oz)  Body mass index is 18.82 kg/m².    Physical Exam  Vitals and nursing note reviewed.   Constitutional:       General: He is not in acute distress.     Appearance: He is well-developed. He is not diaphoretic.   HENT:      Head: Normocephalic and atraumatic.      Nose: Nose normal.   Eyes:      General:         Right eye: No discharge.         Left eye: No discharge.      Conjunctiva/sclera: Conjunctivae normal.      Pupils: Pupils are equal, round, and reactive to light.   Neck:       Thyroid: No thyromegaly.      Vascular: No JVD.      Trachea: No tracheal deviation.   Cardiovascular:      Rate and Rhythm: Normal rate and regular rhythm.      Heart sounds: Normal heart sounds. No murmur heard.    No friction rub. No gallop.   Pulmonary:      Effort: Pulmonary effort is normal. No respiratory distress.      Breath sounds: Normal breath sounds. No wheezing or rales.   Chest:      Chest wall: No tenderness.   Abdominal:      General: Bowel sounds are normal. There is no distension.      Palpations: Abdomen is soft. There is no mass.      Tenderness: There is no abdominal tenderness.   Genitourinary:     Comments: Gramajo cath draining clear, yellow urine  Musculoskeletal:         General: No tenderness or deformity. Normal range of motion.      Cervical back: Normal range of motion and neck supple.   Skin:     General: Skin is warm and dry.      Capillary Refill: Capillary refill takes less than 2 seconds.      Findings: No erythema or rash.   Neurological:      Mental Status: He is alert and oriented to person, place, and time.      Motor: No abnormal muscle tone.      Coordination: Coordination normal.   Psychiatric:         Behavior: Behavior normal.         CRANIAL NERVES     CN III, IV, VI   Pupils are equal, round, and reactive to light.     Significant Labs: All pertinent labs within the past 24 hours have been reviewed.  Recent Lab Results  (Last 5 results in the past 24 hours)        07/14/22  1759   07/14/22  1655   07/14/22  1535   07/14/22  1531   07/14/22  1513        Influenza A, Molecular     Negative           Influenza B, Molecular     Negative           Appearance, UA       Clear         aPTT 25.1  Comment: aPTT therapeutic range = 39-69 seconds               Baso # 0.07               Basophil % 0.5               Bilirubin (UA)       Negative         Color, UA       Yellow         Differential Method Automated               Eos # 0.3               Eosinophil % 2.2               Flu  A & B Source     Nasal swab           Glucose, UA       Negative         Gran # (ANC) 10.3               Gran % 79.5               Hematocrit 38.1               Hemoglobin 12.9               Immature Grans (Abs) 0.05  Comment: Mild elevation in immature granulocytes is non specific and   can be seen in a variety of conditions including stress response,   acute inflammation, trauma and pregnancy. Correlation with other   laboratory and clinical findings is essential.                 Immature Granulocytes 0.4               INR 1.1  Comment: Coumadin Therapy:  2.0 - 3.0 for INR for all indicators except mechanical heart valves  and antiphospholipid syndromes which should use 2.5 - 3.5.                 Ketones, UA       Negative         Lactate, Didier         0.9  Comment: Falsely low lactic acid results can be found in samples   containing >=13.0 mg/dL total bilirubin and/or >=3.5 mg/dL   direct bilirubin.         Leukocytes, UA       2+         Lymph # 1.0               Lymph % 7.9               MCH 30.1               MCHC 33.9               MCV 89               Microscopic Comment       SEE COMMENT  Comment: Other formed elements not mentioned in the report are not   present in the microscopic examination.            Mono # 1.2               Mono % 9.5               MPV 10.7               NITRITE UA       Negative         nRBC 0               Occult Blood UA       Negative         pH, UA       7.0         Platelets 204               Protein, UA       Trace  Comment: Recommend a 24 hour urine protein or a urine   protein/creatinine ratio if globulin induced proteinuria is  clinically suspected.           Protime 11.6                Acceptable   Yes             RBC 4.28               RBC, UA       2         RDW 13.8               SARS-CoV-2 RNA, Amplification, Qual   Negative             Specific Gravity, UA       1.020         Specimen UA       Urine, Catheterized         UROBILINOGEN UA       2.0-3.0          WBC, UA       10         WBC 12.98                                      Significant Imaging: I have reviewed all pertinent imaging results/findings within the past 24 hours.

## 2022-07-15 NOTE — PLAN OF CARE
Patient and wife updated on plan of care. Instructed patient to use call light for assistance, call light in reach.Hourly rounding performed, bed locked, side rails up, and in low position. Bed alarm on. Turns q2 hours, waffle mattress applied. Heparin gtt discontinued per MD order. Education provided, questions answered as of now. Patient remained free from falls during shift. Will continue to monitor per unit practice/policy. Jelena Patton RN.

## 2022-07-15 NOTE — CONSULTS
[facility] - Telemetry ([facility])  Adult Nutrition  Consult Note    SUMMARY     Recommendations    Recommendation/Intervention:   1. Recommend to continue pt on a regular diet as tolerated.   2. Recommend to consider Boost + TID with meals for increased energy and protein needs.   3. Encourage PO intake.    Goals:   1. Patient will increase PO intake to meet >75% of estimated energy needs by RD follow up.    Nutrition Goal Status: new    Communication of RD Recs:  (Plan of care;Sticky Note)    Assessment and Plan    Nutrition Problem  Inadequate energy intake     Related to (etiology):   Swallowing difficulty     Signs and Symptoms (as evidenced by):   25-50% PO intake, pt swallowing problems due to inflamed throat     Interventions/Recommendations (treatment strategy):  Regular Diet as tolerated   Collaboration with Medical Providers   Boost + TID with meals     Nutrition Diagnosis Status:   New         Malnutrition Assessment                                       Reason for Assessment    Reason For Assessment: consult  Diagnosis:  (PE)  Relevant Medical History: HLD, alzheimers  Interdisciplinary Rounds: did not attend  General Information Comments:     7/15:RD consulted for swallowing. Patient is on a regular diet. Spoke to pts wife. Wife stated that she he ate his grits this am. Pts wife stated that the swallowing is getting better. Pt had tube down his throat which irritated. Patients wife was giving pt a banana to eat while in room. Patient has no edema noted. Patients last BM 7/14. Labs reviewed. No past history with weights. Will continue to monitor.  Nutrition Discharge Planning: regular diet    Nutrition Risk Screen    Nutrition Risk Screen: dysphagia or difficulty swallowing    Nutrition/Diet History    Patient Reported Diet/Restrictions/Preferences: general  Spiritual, Cultural Beliefs, [employer]yb[employer]ian Practices, Values that Affect Care: no  Food Allergies: NKFA  Factors Affecting Nutritional Intake: impaired  "cognitive status/motor control, difficulty/impaired swallowing    Anthropometrics    Temp: 97.3 °F (36.3 °C)  Height: 6' 1" (185.4 cm)  Height (inches): 73 in  Weight Method: Bed Scale  Weight: 70.3 kg (154 lb 15.7 oz)  Weight (lb): 154.98 lb  Ideal Body Weight (IBW), Male: 184 lb  % Ideal Body Weight, Male (lb): 84.23 %  BMI (Calculated): 20.5  BMI Grade: 18.5-24.9 - normal       Lab/Procedures/Meds  BMP  Lab Results   Component Value Date     (L) 07/15/2022    K 4.1 07/15/2022    CL 98 07/15/2022    CO2 23 07/15/2022    BUN 9 07/15/2022    CREATININE 0.6 07/15/2022    CALCIUM 8.8 07/15/2022    ANIONGAP 9 07/15/2022    ESTGFRAFRICA >60 07/15/2022    EGFRNONAA >60 07/15/2022     Lab Results   Component Value Date     (L) 07/15/2022    K 4.1 07/15/2022    CL 98 07/15/2022    CO2 23 07/15/2022     Lab Results   Component Value Date    LABPROT 11.6 07/14/2022    ALBUMIN 3.0 (L) 07/14/2022     Lab Results   Component Value Date    CALCIUM 8.8 07/15/2022     Pertinent Labs Reviewed: reviewed  Pertinent Medications Reviewed: reviewed  Scheduled Meds:   apixaban  10 mg Oral BID    atorvastatin  10 mg Oral Daily    docusate sodium  100 mg Oral BID    donepeziL  10 mg Oral Nightly    finasteride  5 mg Oral Daily    folic acid  1 mg Oral Daily    memantine  10 mg Oral BID    metoprolol tartrate  12.5 mg Oral BID    polyethylene glycol  17 g Oral Daily    sodium chloride 0.9%  10 mL Intravenous Q8H    tamsulosin  0.4 mg Oral Daily     Continuous Infusions:  PRN Meds:.acetaminophen, acetaminophen, aluminum-magnesium hydroxide-simethicone, dextrose 10%, dextrose 10%, melatonin, morphine, naloxone, ondansetron, oxyCODONE, promethazine, simethicone    Physical Findings/Assessment         Estimated/Assessed Needs    Weight Used For Calorie Calculations: 70.3 kg (154 lb 15.7 oz)  Energy Calorie Requirements (kcal): 1759 (mifflin x 1.2AF)  Energy Need Method: Alka Jorgensen  Protein Requirements: 70-84 " (1.0-1.2g/kg)  Weight Used For Protein Calculations: 70.3 kg (154 lb 15.7 oz)  Fluid Requirements (mL): 1759  Estimated Fluid Requirement Method: RDA Method  RDA Method (mL): 1759  CHO Requirement: 219      Nutrition Prescription Ordered    Current Diet Order: Regular    Evaluation of Received Nutrient/Fluid Intake    % Kcal Needs: 25-50%  % Protein Needs: 25-50%  Energy Calories Required: not meeting needs  Protein Required: not meeting needs  Fluid Required: not meeting needs  Tolerance: tolerating  % Intake of Estimated Energy Needs: 25 - 50 %  % Meal Intake: 25 - 50 %    Nutrition Risk    Level of Risk/Frequency of Follow-up: moderate       Monitor and Evaluation    Food and Nutrient Intake: energy intake, food and beverage intake  Food and Nutrient Adminstration: diet order  Knowledge/Beliefs/Attitudes: food and nutrition knowledge/skill, beliefs and attitudes  Physical Activity and Function: nutrition-related ADLs and IADLs  Anthropometric Measurements: weight, weight change, body mass index  Biochemical Data, Medical Tests and Procedures: electrolyte and renal panel, inflammatory profile, gastrointestinal profile, lipid profile, glucose/endocrine profile  Nutrition-Focused Physical Findings: overall appearance       Nutrition Follow-Up    RD Follow-up?: Yes   aMxine Mckeon RD,JUSTINEN

## 2022-07-15 NOTE — ASSESSMENT & PLAN NOTE
- Likely provoked in the setting of recent surgery, multiple hospital admission and being sedentary   -Heparin gtt transitioned to oral Apixaban

## 2022-07-15 NOTE — ASSESSMENT & PLAN NOTE
--has indwelling mendes at home  --continue flomax and proscar  --f/u OP with urology as scheduled

## 2022-07-15 NOTE — ASSESSMENT & PLAN NOTE
- Pt with indwelling mendes catheter   -Catheter was exchanged 10 days ago according to wife   -UA showed WBC 10/HPF  -Urine culture requested   -Rocephin initiated

## 2022-07-15 NOTE — CONSULTS
Chart reviewed by Dr. Edmonds.       ASSESSMENT/PLAN:    Multifocal PE    Echo demonstrated no right heart strain, systemic treatment and f/u is recommended.  No intervention at this time.  Small amount of multi focal PE but no central PE as per Dr. Edmonds        Thank you for the consult.

## 2022-07-15 NOTE — ASSESSMENT & PLAN NOTE
--CTA chest shows Multifocal bilateral pulmonary emboli as above with findings concerning for right heart strain.    --heparin gtt  --IR consulted for possible thrombolysis

## 2022-07-15 NOTE — PLAN OF CARE
Recommendation/Intervention: 7/15  1. Recommend to continue pt on a regular diet as tolerated.   2. Recommend to consider Boost + TID with meals for increased energy and protein needs.   3. Encourage PO intake.    Maxine Mckeon RD,LDN

## 2022-07-15 NOTE — NURSING
Patient admitted from ED to Telemetry. Patient slid from strecher to bed with ED RN. Skin assessed at this time. No breakdown noted. Significant other at bedside. Patient oriented to self only. Cardiac monitor 8692 placed on patient and verified. Heparin infusion verified with ED RN and continued. Patient left in bed NAD. Instructed to call for needs. Bed alarm on, non-skid socks, and railing up for safety. Gramajo in place, history of prostate issues and urinary retention.

## 2022-07-15 NOTE — H&P
[facility] Telemetry ([facility] Medicine  History & Physical    Patient Name: Riley Saba  MRN: [id number]  Patient Class: OP- Observation  Admission Date: 7/14/2022  Attending Physician: Jeffry Cabrera MD   Primary Care Provider: Bj Clayton MD         Patient information was obtained from patient, spouse/SO, relative(s), past medical records and ER records.     Subjective:     Principal Problem:Acute pulmonary embolism    Chief Complaint:   Chief Complaint   Patient presents with    Fatigue     Pt. Presents to Ed via EMS due to having generalized  weakness for the past few days, and today home health found pt to have a low o2 stat of 82-83. Pt is 94 on 6lpm via NC         HPI: 83 y/o male with PMHx of HTN, HLD, alzheimers, BPH, SVT, and  who presented to the ED with c/o hypoxia that onset gradually earlier today. Pt was found with O2 sats of 80% by Home Health. Pt does not use home oxygen. Pt was not appearing SOB or using accessory muscles.  Spouse/pt denies: fever, chills, cough, SOB, abd pain, BLE edema, and all other sx at this time.  ED workup shows: WBC 13K, H/H 13/38, , Trop. 0.074, Na 130, Bilirubin 1.3, u/a shows 2+ leukocytes, 10WBC. CTA chest shows: Multifocal bilateral pulmonary emboli as above with findings concerning for right heart strain.  Consider consultation with interventional radiology for thrombolysis.    Moderate bilateral pleural effusions with associated compressive atelectasis.  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  He is a full code and his SDM is his wife  He will be kept on OBS for bilat PE under the care of [facility] medicine.        Past Medical History:   Diagnosis Date    Alzheimer's disease, unspecified (CODE)     BPH without obstruction/lower urinary tract symptoms     Constipation     HLD (hyperlipidemia)     Orthostatic hypotension     Supraventricular tachycardia        No past surgical history on  file.    Review of patient's allergies indicates:  No Known Allergies    No current facility-administered medications on file prior to encounter.     Current Outpatient Medications on File Prior to Encounter   Medication Sig    atorvastatin (LIPITOR) 10 MG tablet Take 10 mg by mouth once daily.    docusate sodium (COLACE) 100 MG capsule Take 100 mg by mouth 2 (two) times daily.    donepeziL (ARICEPT) 10 MG tablet Take 10 mg by mouth nightly.    finasteride (PROSCAR) 5 mg tablet Take 5 mg by mouth once daily.    folic acid (FOLVITE) 1 MG tablet Take 1 mg by mouth once daily.    magnesium hydroxide 400 mg/5 ml (MILK OF MAGNESIA) 400 mg/5 mL Susp Take 20 mLs by mouth daily as needed.    memantine (NAMENDA) 10 MG Tab Take 10 mg by mouth 2 (two) times daily.    metoprolol tartrate (LOPRESSOR) 25 MG tablet Take 12.5 mg by mouth 2 (two) times daily.    nystatin (MYCOSTATIN) ointment Apply topically 2 (two) times daily.    polyethylene glycol (GLYCOLAX) 17 gram/dose powder Take 17 g by mouth once daily.    tamsulosin (FLOMAX) 0.4 mg Cap Take 0.4 mg by mouth once daily.     Family History    Reviewed and not contributory       Tobacco Use    Smoking status: Not on file    Smokeless tobacco: Not on file   Substance and Sexual Activity    Alcohol use: Not on file    Drug use: Not on file    Sexual activity: Not on file     Review of Systems   Constitutional:  Negative for activity change, appetite change, chills, fatigue and fever.   HENT:  Negative for dental problem, ear pain, hearing loss, mouth sores, nosebleeds, sinus pressure, sore throat, tinnitus and trouble swallowing.    Eyes:  Negative for pain, discharge and visual disturbance.   Respiratory:  Negative for cough, choking, chest tightness, shortness of breath and wheezing.    Cardiovascular:  Negative for chest pain, palpitations and leg swelling.   Gastrointestinal:  Negative for abdominal distention, abdominal pain, anal bleeding, blood in stool,  constipation, diarrhea, nausea and vomiting.   Endocrine: Negative for cold intolerance, heat intolerance, polydipsia, polyphagia and polyuria.   Genitourinary:  Negative for decreased urine volume, difficulty urinating, flank pain, frequency, hematuria and urgency.   Musculoskeletal:  Negative for arthralgias, back pain, gait problem, myalgias, neck pain and neck stiffness.   Skin:  Negative for color change, rash and wound.   Allergic/Immunologic: Negative.    Neurological:  Negative for dizziness, tremors, seizures, syncope, speech difficulty, weakness, light-headedness and headaches.   Hematological: Negative.    Psychiatric/Behavioral:  Negative for agitation, behavioral problems, confusion and sleep disturbance. The patient is not nervous/anxious.    All other systems reviewed and are negative.  Objective:     Vital Signs (Most Recent):  Temp: 97.7 °F (36.5 °C) (07/14/22 2039)  Pulse: 96 (07/14/22 2039)  Resp: 18 (07/14/22 2039)  BP: (!) 146/108 (07/14/22 2039)  SpO2: (!) 92 % (07/14/22 2039)   Vital Signs (24h Range):  Temp:  [97.7 °F (36.5 °C)] 97.7 °F (36.5 °C)  Pulse:  [] 96  Resp:  [18-31] 18  SpO2:  [89 %-97 %] 92 %  BP: ()/() 146/108     Weight: 64.7 kg (142 lb 10.2 oz)  Body mass index is 18.82 kg/m².    Physical Exam  Vitals and nursing note reviewed.   Constitutional:       General: He is not in acute distress.     Appearance: He is well-developed. He is not diaphoretic.   HENT:      Head: Normocephalic and atraumatic.      Nose: Nose normal.   Eyes:      General:         Right eye: No discharge.         Left eye: No discharge.      Conjunctiva/sclera: Conjunctivae normal.      Pupils: Pupils are equal, round, and reactive to light.   Neck:      Thyroid: No thyromegaly.      Vascular: No JVD.      Trachea: No tracheal deviation.   Cardiovascular:      Rate and Rhythm: Normal rate and regular rhythm.      Heart sounds: Normal heart sounds. No murmur heard.    No friction rub. No  gallop.   Pulmonary:      Effort: Pulmonary effort is normal. No respiratory distress.      Breath sounds: Normal breath sounds. No wheezing or rales.   Chest:      Chest wall: No tenderness.   Abdominal:      General: Bowel sounds are normal. There is no distension.      Palpations: Abdomen is soft. There is no mass.      Tenderness: There is no abdominal tenderness.   Genitourinary:     Comments: Gramajo cath draining clear, yellow urine  Musculoskeletal:         General: No tenderness or deformity. Normal range of motion.      Cervical back: Normal range of motion and neck supple.   Skin:     General: Skin is warm and dry.      Capillary Refill: Capillary refill takes less than 2 seconds.      Findings: No erythema or rash.   Neurological:      Mental Status: He is alert and oriented to person, place, and time.      Motor: No abnormal muscle tone.      Coordination: Coordination normal.   Psychiatric:         Behavior: Behavior normal.         CRANIAL NERVES     CN III, IV, VI   Pupils are equal, round, and reactive to light.     Significant Labs: All pertinent labs within the past 24 hours have been reviewed.  Recent Lab Results  (Last 5 results in the past 24 hours)        07/14/22  1759   07/14/22  1655   07/14/22  1535   07/14/22  1531   07/14/22  1513        Influenza A, Molecular     Negative           Influenza B, Molecular     Negative           Appearance, UA       Clear         aPTT 25.1  Comment: aPTT therapeutic range = 39-69 seconds               Baso # 0.07               Basophil % 0.5               Bilirubin (UA)       Negative         Color, UA       Yellow         Differential Method Automated               Eos # 0.3               Eosinophil % 2.2               Flu A & B Source     Nasal swab           Glucose, UA       Negative         Gran # (ANC) 10.3               Gran % 79.5               Hematocrit 38.1               Hemoglobin 12.9               Immature Grans (Abs) 0.05  Comment: Mild  elevation in immature granulocytes is non specific and   can be seen in a variety of conditions including stress response,   acute inflammation, trauma and pregnancy. Correlation with other   laboratory and clinical findings is essential.                 Immature Granulocytes 0.4               INR 1.1  Comment: Coumadin Therapy:  2.0 - 3.0 for INR for all indicators except mechanical heart valves  and antiphospholipid syndromes which should use 2.5 - 3.5.                 Ketones, UA       Negative         Lactate, Didier         0.9  Comment: Falsely low lactic acid results can be found in samples   containing >=13.0 mg/dL total bilirubin and/or >=3.5 mg/dL   direct bilirubin.         Leukocytes, UA       2+         Lymph # 1.0               Lymph % 7.9               MCH 30.1               MCHC 33.9               MCV 89               Microscopic Comment       SEE COMMENT  Comment: Other formed elements not mentioned in the report are not   present in the microscopic examination.            Mono # 1.2               Mono % 9.5               MPV 10.7               NITRITE UA       Negative         nRBC 0               Occult Blood UA       Negative         pH, UA       7.0         Platelets 204               Protein, UA       Trace  Comment: Recommend a 24 hour urine protein or a urine   protein/creatinine ratio if globulin induced proteinuria is  clinically suspected.           Protime 11.6                Acceptable   Yes             RBC 4.28               RBC, UA       2         RDW 13.8               SARS-CoV-2 RNA, Amplification, Qual   Negative             Specific Gravity, UA       1.020         Specimen UA       Urine, Catheterized         UROBILINOGEN UA       2.0-3.0         WBC, UA       10         WBC 12.98                                      Significant Imaging: I have reviewed all pertinent imaging results/findings within the past 24 hours.    Assessment/Plan:     * Acute pulmonary  embolism  --CTA chest shows Multifocal bilateral pulmonary emboli as above with findings concerning for right heart strain.    --heparin gtt  --IR consulted for possible thrombolysis      BPH (benign prostatic hyperplasia)  --has indwelling mendes at home  --continue flomax and proscar  --f/u OP with urology as scheduled      SVT (supraventricular tachycardia)  --continue BB      Essential hypertension  --continue home meds  --cardiac diet      Alzheimer's dementia  --continue namenda, aricept  --supportive care      Elevated troponin  --likely 2/2 PE  --pt denies CP  --2d echo pending  --trend troponin        VTE Risk Mitigation (From admission, onward)         Ordered     heparin 25,000 units in dextrose 5% (100 units/ml) IV bolus from bag - ADDITIONAL PRN BOLUS - 60 units/kg  As needed (PRN)        Question:  Heparin Infusion Adjustment (DO NOT MODIFY ANSWER)  Answer:  \[employer]\epic\Images\Pharmacy\HeparinInfusions\heparin HIGH INTENSITY nomogram for [facility] [id number].pdf    07/14/22 1743     heparin 25,000 units in dextrose 5% (100 units/ml) IV bolus from bag - ADDITIONAL PRN BOLUS - 30 units/kg  As needed (PRN)        Question:  Heparin Infusion Adjustment (DO NOT MODIFY ANSWER)  Answer:  \\[email or web address]\epic\Images\Pharmacy\HeparinInfusions\heparin HIGH INTENSITY nomogram for [facility] [id number].pdf    07/14/22 1743     heparin 25,000 units in dextrose 5% 250 mL (100 units/mL) infusion HIGH INTENSITY nomogram - [facility]  Continuous        Question Answer Comment   Heparin Infusion Adjustment (DO NOT MODIFY ANSWER) \\[email or web address]\epic\Images\Pharmacy\HeparinInfusions\heparin HIGH INTENSITY nomogram for [facility] [id number].pdf    Begin at (in units/kg/hr) 18        07/14/22 1743                   SHARRON Salazar  Department of Hospital Medicine   [facility] - Telemetry ([facility])

## 2022-07-15 NOTE — PLAN OF CARE
Patient admitted overnight. No acute events. Patient continued on high intensity heparin infusion. Significant other at bedside. Patient turned frequently in bed using wedge and pillows. Patient given repeated, simple directions and reassurance due to Alzheimer's disease. Gramajo in place with good UOP. Urine clear yellow. Continue current plan of care as ordered.  Problem: Adult Inpatient Plan of Care  Goal: Plan of Care Review  Outcome: Ongoing, Progressing  Goal: Patient-Specific Goal (Individualized)  Outcome: Ongoing, Progressing  Goal: Absence of Hospital-Acquired Illness or Injury  Outcome: Ongoing, Progressing  Goal: Optimal Comfort and Wellbeing  Outcome: Ongoing, Progressing  Goal: Readiness for Transition of Care  Outcome: Ongoing, Progressing     Problem: Fall Injury Risk  Goal: Absence of Fall and Fall-Related Injury  Outcome: Ongoing, Progressing     Problem: Infection  Goal: Absence of Infection Signs and Symptoms  Outcome: Ongoing, Progressing     Problem: Skin Injury Risk Increased  Goal: Skin Health and Integrity  Outcome: Ongoing, Progressing

## 2022-07-15 NOTE — PT/OT/SLP PROGRESS
Physical Therapy      Patient Name:  Riley Saba   MRN:  [id number]    KAIN BANDA INITIATED THIS AM VIA CHART REVIEW, PT CURRENTLY IN PROCEDURE IN ROOM TO R/O DVT, WILL ASSESS PT NEXT VISIT    [place], PT  7/15/2022  0147

## 2022-07-15 NOTE — ASSESSMENT & PLAN NOTE
--CTA chest shows Multifocal bilateral pulmonary emboli as above with findings concerning for right heart strain.    --heparin gtt  --IR consulted for possible thrombolysis  --- Likely provoked in the setting of recent surgery, multiple hospital admission and being sedentary   7/15-  Echo was reviewed by IR and suggested no Rt heart strain and IR thrombolysis was not indicated.  Heparin gtt transitioned to oral Apixaban

## 2022-07-15 NOTE — ASSESSMENT & PLAN NOTE
-Monitor BP trend as wife reports orthostatic drop of systolic BP at home   -Orthostatic vitals   -Continue BB

## 2022-07-15 NOTE — HOSPITAL COURSE
Admitted for further evaluation of hypoxia and increased generalized weakness. Noted to have multifocal gloria pul emboli . Echo was reviewed by IR and suggested no Rt heart strain and IR thrombolysis was not indicated. Venous U/S gloria lower ext showed  DVT within the right superficial femoral vein and bilateral popliteal veins. UA with WBC 10/HPF with indwelling Gramajo catheter. Catheter was exchanged 10 days ago according to wife. Urine culture is requested.     7/15- Pt is awake , oriented to self and person only. H/O dementia. Appears ill and weakened. IR suggested no IR thrombolysis or thrombectomy indicated. Heparin gtt transitioned to oral Apixaban. Rocephin initiated for UTI.       7/16- Was awake earlier, however somnolent during my visit after PT session. Orthostatic SBP drop noted during PT. Encourage oral fluid intake and Midodrine added. O2 wean down to RA. Disposition - home with HH PT/OT. Apixaban continues.     7/17- BP improved with IVF and Midodrine as well as Flomax held this morning . No orthostatic drop noted with standing during PT session. Pt remains extremely weak in general and high risk of fall and injury while on anticoagulation treatment with Apixaban.  Per wife, pt was ambulatory before prostate surgery in 6/2022. Pt will benefit from SNF placement . CM consulted to assist with DC planning.     7/18- Wife initially wanted to take pt home, but later opted SNF placement . Pt accepted to [facility]. Pt is examined and deemed stable for transfer to SNF. Pt will follow up with PCP, Hematology and Urology after discharge .

## 2022-07-15 NOTE — SUBJECTIVE & OBJECTIVE
Interval History:   Echo was reviewed by IR and suggested no Rt heart strain and IR thrombolysis was not indicated. Venous U/S gloria lower ext showed  DVT within the right superficial femoral vein and bilateral popliteal veins      Review of Systems   Unable to perform ROS: Dementia   Objective:     Vital Signs (Most Recent):  Temp: 99.3 °F (37.4 °C) (07/15/22 1134)  Pulse: (!) 114 (07/15/22 1300)  Resp: 18 (07/15/22 1134)  BP: 124/63 (07/15/22 1134)  SpO2: 97 % (07/15/22 1134)   Vital Signs (24h Range):  Temp:  [97.3 °F (36.3 °C)-99.3 °F (37.4 °C)] 99.3 °F (37.4 °C)  Pulse:  [] 114  Resp:  [18-31] 18  SpO2:  [89 %-97 %] 97 %  BP: ()/() 124/63     Weight: 70.3 kg (154 lb 15.7 oz)  Body mass index is 20.45 kg/m².    Intake/Output Summary (Last 24 hours) at 7/15/2022 1444  Last data filed at 7/15/2022 0917  Gross per 24 hour   Intake 223.81 ml   Output 1600 ml   Net -1376.19 ml      Physical Exam  Vitals and nursing note reviewed.   Constitutional:       General: He is not in acute distress.     Appearance: He is well-developed. He is not diaphoretic.   HENT:      Head: Normocephalic and atraumatic.      Nose: Nose normal.   Eyes:      General:         Right eye: No discharge.         Left eye: No discharge.      Conjunctiva/sclera: Conjunctivae normal.      Pupils: Pupils are equal, round, and reactive to light.   Neck:      Thyroid: No thyromegaly.      Vascular: No JVD.      Trachea: No tracheal deviation.   Cardiovascular:      Rate and Rhythm: Normal rate and regular rhythm.      Heart sounds: Normal heart sounds. No murmur heard.    No friction rub. No gallop.   Pulmonary:      Effort: Pulmonary effort is normal. No respiratory distress.      Breath sounds: Normal breath sounds. No wheezing or rales.   Chest:      Chest wall: No tenderness.   Abdominal:      General: Bowel sounds are normal. There is no distension.      Palpations: Abdomen is soft. There is no mass.      Tenderness: There is no  abdominal tenderness.   Genitourinary:     Comments: Gramajo cath draining clear, yellow urine  Musculoskeletal:         General: No tenderness or deformity. Normal range of motion.      Cervical back: Normal range of motion and neck supple.   Skin:     General: Skin is warm and dry.      Capillary Refill: Capillary refill takes less than 2 seconds.      Findings: No erythema or rash.   Neurological:      Mental Status: He is alert.      Motor: Weakness present. No abnormal muscle tone.      Coordination: Coordination normal.      Comments: Oriented to self and person only    Psychiatric:         Behavior: Behavior normal.       Significant Labs: All pertinent labs within the past 24 hours have been reviewed.  BMP:   Recent Labs   Lab 07/15/22  0335   *   *   K 4.1   CL 98   CO2 23   BUN 9   CREATININE 0.6   CALCIUM 8.8     CBC:   Recent Labs   Lab 07/14/22  1512 07/14/22  1759   WBC 13.10* 12.98*   HGB 13.7* 12.9*   HCT 40.8 38.1*    204     CMP:   Recent Labs   Lab 07/14/22  1512 07/15/22  0335   * 130*   K 4.7 4.1   CL 91* 98   CO2 27 23    111*   BUN 11 9   CREATININE 0.7 0.6   CALCIUM 9.3 8.8   PROT 6.6  --    ALBUMIN 3.0*  --    BILITOT 1.3*  --    ALKPHOS 83  --    AST 21  --    ALT 20  --    ANIONGAP 12 9   EGFRNONAA >60 >60       Significant Imaging:

## 2022-07-15 NOTE — PT/OT/SLP PROGRESS
Occupational Therapy      Patient Name:  Riley Saba   MRN:  [id number]    OT eval orders received. Chart review completed. Presented to room at 1120 and patient with ultrasound for r/o DVT. Will continue efforts.    Iliana Marr, OWEN    7/15/2022   1120

## 2022-07-15 NOTE — PLAN OF CARE
[facility] - Telemetry (Hospital)  Initial Discharge Assessment       Primary Care Provider: Bj Clayton MD    Admission Diagnosis: Dysrhythmia [I49.9]  Tachycardia [R00.0]  Hypoxia [R09.02]  Bilateral pulmonary embolism [I26.99]    Admission Date: 7/14/2022  Expected Discharge Date:     Discharge Barriers Identified: None    Payor: MEDICARE / Plan: MEDICARE PART A & B / Product Type: Government /     Extended Emergency Contact Information  Primary Emergency Contact: STACIE PANIAGUA  Mobile Phone: [phone]  Relation: Spouse  Preferred language: English   needed? No    Discharge Plan A: Home Health  Discharge Plan B: Skilled Nursing Facility      [id number] - [place] - [address] AT [facility] [address]  [address]  [place] [address]  Phone: [phone] Fax: [phone]      Initial Assessment (most recent)     Adult Discharge Assessment - 07/15/22 1045        Discharge Assessment    Assessment Type Discharge Planning Assessment     Confirmed/corrected address, phone number and insurance Yes     Confirmed Demographics Correct on Facesheet     Source of Information family     Communicated ELIZABETH with patient/caregiver Date not available/Unable to determine     Reason For Admission PE     Lives With spouse;other relative(s)   Sister-in-law and nephew    Facility Arrived From: home     Do you expect to return to your current living situation? Yes     Do you have help at home or someone to help you manage your care at home? Yes     Who are your caregiver(s) and their phone number(s)? Staciebraeden Garcia, spouse     Prior to hospitilization cognitive status: Unable to Assess     Current cognitive status: Alert/Oriented   Oriented to person    Walking or Climbing Stairs Difficulty ambulation difficulty, assistance 1 person;ambulation difficulty, requires equipment     Mobility Management walks with rolling walker only when PT is at the home     Dressing/Bathing  Difficulty bathing difficulty, assistance 1 person;dressing difficulty, assistance 1 person     Dressing/Bathing Management spouse assist with bathing and dressing     Home Layout Able to live on 1st floor     Equipment Currently Used at Home hospital bed;bedside commode;walker, rolling;other (see comments)   mendes catheter and supplies    Readmission within 30 days? Yes   Discharged from [facility] 7-7-22    Patient currently being followed by outpatient case management? Yes   [facility] out patient CM    If yes, name of outpatient case management following: other (comments)     Do you currently have service(s) that help you manage your care at home? Yes     Name and Contact number of agency [facility] - SN PT OT     Is the pt/caregiver preference to resume services with current agency Yes     Do you take prescription medications? Yes     Do you have prescription coverage? Yes     Coverage BCBS     Do you have any problems affording any of your prescribed medications? No     Is the patient taking medications as prescribed? yes     Who is going to help you get home at discharge? Stacie Garcia, spouse     How do you get to doctors appointments? family or friend will provide     Are you on dialysis? No     Do you take coumadin? No     Discharge Plan A Home Health     Discharge Plan B Skilled Nursing Facility     DME Needed Upon Discharge  none     Discharge Plan discussed with: Spouse/sig other     Name(s) and Number(s) Stacie Garcia, spouse     Discharge Barriers Identified None        Relationship/Environment    Name(s) of Who Lives With Patient Staice Garcia, spouse, Sister-in-law and nephew               Met with patient and spouse. Patient lives with wife. He is dependent with ADL's as indicated above.  Patient has been in the hospital at [facility] multiple times since [facility] in June, 2022.  He has spent some time al [facility] but was rejected from returning at last admit.  Patient was discharge home with  [facility] with SN, PT, OT.    Wife wishes to resume services at discharge.  Also discussed SNF but seems reluctant at this time.    Also, wife is interested in NP at home program.    Updated white board with 's name and number. Transitional Care Folder, Discharge Planning Begins on Admission pamphlet, [facility] pamphlet, Advance Directive information given to patient along with the contact information given.Instructed patient or family to call with any questions or concerns.

## 2022-07-16 PROBLEM — I95.1 ORTHOSTATIC HYPOTENSION: Status: ACTIVE | Noted: 2022-07-16

## 2022-07-16 LAB
ANION GAP SERPL CALC-SCNC: 10 MMOL/L (ref 8–16)
BASOPHILS # BLD AUTO: 0.09 K/UL (ref 0–0.2)
BASOPHILS NFR BLD: 1 % (ref 0–1.9)
BUN SERPL-MCNC: 9 MG/DL (ref 8–23)
CALCIUM SERPL-MCNC: 8.6 MG/DL (ref 8.7–10.5)
CHLORIDE SERPL-SCNC: 97 MMOL/L (ref 95–110)
CO2 SERPL-SCNC: 24 MMOL/L (ref 23–29)
CREAT SERPL-MCNC: 0.6 MG/DL (ref 0.5–1.4)
DIFFERENTIAL METHOD: ABNORMAL
EOSINOPHIL # BLD AUTO: 0.8 K/UL (ref 0–0.5)
EOSINOPHIL NFR BLD: 8.1 % (ref 0–8)
ERYTHROCYTE [DISTWIDTH] IN BLOOD BY AUTOMATED COUNT: 13.7 % (ref 11.5–14.5)
EST. GFR  (AFRICAN AMERICAN): >60 ML/MIN/1.73 M^2
EST. GFR  (NON AFRICAN AMERICAN): >60 ML/MIN/1.73 M^2
GLUCOSE SERPL-MCNC: 113 MG/DL (ref 70–110)
HCT VFR BLD AUTO: 35.8 % (ref 40–54)
HGB BLD-MCNC: 12.3 G/DL (ref 14–18)
IMM GRANULOCYTES # BLD AUTO: 0.04 K/UL (ref 0–0.04)
IMM GRANULOCYTES NFR BLD AUTO: 0.4 % (ref 0–0.5)
LYMPHOCYTES # BLD AUTO: 1 K/UL (ref 1–4.8)
LYMPHOCYTES NFR BLD: 11 % (ref 18–48)
MAGNESIUM SERPL-MCNC: 2 MG/DL (ref 1.6–2.6)
MCH RBC QN AUTO: 30.2 PG (ref 27–31)
MCHC RBC AUTO-ENTMCNC: 34.4 G/DL (ref 32–36)
MCV RBC AUTO: 88 FL (ref 82–98)
MONOCYTES # BLD AUTO: 1.2 K/UL (ref 0.3–1)
MONOCYTES NFR BLD: 12.4 % (ref 4–15)
NEUTROPHILS # BLD AUTO: 6.3 K/UL (ref 1.8–7.7)
NEUTROPHILS NFR BLD: 67.1 % (ref 38–73)
NRBC BLD-RTO: 0 /100 WBC
PHOSPHATE SERPL-MCNC: 3 MG/DL (ref 2.7–4.5)
PLATELET # BLD AUTO: 213 K/UL (ref 150–450)
PMV BLD AUTO: 10.9 FL (ref 9.2–12.9)
POTASSIUM SERPL-SCNC: 4.2 MMOL/L (ref 3.5–5.1)
RBC # BLD AUTO: 4.07 M/UL (ref 4.6–6.2)
SODIUM SERPL-SCNC: 131 MMOL/L (ref 136–145)
WBC # BLD AUTO: 9.4 K/UL (ref 3.9–12.7)

## 2022-07-16 PROCEDURE — 97530 THERAPEUTIC ACTIVITIES: CPT

## 2022-07-16 PROCEDURE — 92526 ORAL FUNCTION THERAPY: CPT

## 2022-07-16 PROCEDURE — 97162 PT EVAL MOD COMPLEX 30 MIN: CPT

## 2022-07-16 PROCEDURE — 80048 BASIC METABOLIC PNL TOTAL CA: CPT | Performed by: NURSE PRACTITIONER

## 2022-07-16 PROCEDURE — 25000003 PHARM REV CODE 250: Performed by: NURSE PRACTITIONER

## 2022-07-16 PROCEDURE — 84100 ASSAY OF PHOSPHORUS: CPT | Performed by: INTERNAL MEDICINE

## 2022-07-16 PROCEDURE — 97166 OT EVAL MOD COMPLEX 45 MIN: CPT

## 2022-07-16 PROCEDURE — 83735 ASSAY OF MAGNESIUM: CPT | Performed by: INTERNAL MEDICINE

## 2022-07-16 PROCEDURE — 99223 1ST HOSP IP/OBS HIGH 75: CPT | Mod: ,,, | Performed by: INTERNAL MEDICINE

## 2022-07-16 PROCEDURE — 85025 COMPLETE CBC W/AUTO DIFF WBC: CPT | Performed by: INTERNAL MEDICINE

## 2022-07-16 PROCEDURE — 94761 N-INVAS EAR/PLS OXIMETRY MLT: CPT

## 2022-07-16 PROCEDURE — 21400001 HC TELEMETRY ROOM

## 2022-07-16 PROCEDURE — 99900035 HC TECH TIME PER 15 MIN (STAT)

## 2022-07-16 PROCEDURE — 99223 PR INITIAL HOSPITAL CARE,LEVL III: ICD-10-PCS | Mod: ,,, | Performed by: INTERNAL MEDICINE

## 2022-07-16 PROCEDURE — 25000003 PHARM REV CODE 250: Performed by: INTERNAL MEDICINE

## 2022-07-16 PROCEDURE — 63600175 PHARM REV CODE 636 W HCPCS: Performed by: INTERNAL MEDICINE

## 2022-07-16 PROCEDURE — 36415 COLL VENOUS BLD VENIPUNCTURE: CPT | Performed by: NURSE PRACTITIONER

## 2022-07-16 PROCEDURE — A4216 STERILE WATER/SALINE, 10 ML: HCPCS | Performed by: NURSE PRACTITIONER

## 2022-07-16 RX ORDER — MIDODRINE HYDROCHLORIDE 5 MG/1
5 TABLET ORAL
Status: DISCONTINUED | OUTPATIENT
Start: 2022-07-16 | End: 2022-07-18 | Stop reason: HOSPADM

## 2022-07-16 RX ORDER — SODIUM CHLORIDE 9 MG/ML
INJECTION, SOLUTION INTRAVENOUS CONTINUOUS
Status: ACTIVE | OUTPATIENT
Start: 2022-07-16 | End: 2022-07-17

## 2022-07-16 RX ORDER — POLYETHYLENE GLYCOL 3350 17 G/17G
17 POWDER, FOR SOLUTION ORAL DAILY
Status: DISCONTINUED | OUTPATIENT
Start: 2022-07-16 | End: 2022-07-18 | Stop reason: HOSPADM

## 2022-07-16 RX ADMIN — TAMSULOSIN HYDROCHLORIDE 0.4 MG: 0.4 CAPSULE ORAL at 09:07

## 2022-07-16 RX ADMIN — FINASTERIDE 5 MG: 5 TABLET, FILM COATED ORAL at 09:07

## 2022-07-16 RX ADMIN — APIXABAN 10 MG: 2.5 TABLET, FILM COATED ORAL at 09:07

## 2022-07-16 RX ADMIN — CEFTRIAXONE 1 G: 1 INJECTION, SOLUTION INTRAVENOUS at 02:07

## 2022-07-16 RX ADMIN — MIDODRINE HYDROCHLORIDE 5 MG: 5 TABLET ORAL at 04:07

## 2022-07-16 RX ADMIN — DOCUSATE SODIUM 100 MG: 100 CAPSULE, LIQUID FILLED ORAL at 09:07

## 2022-07-16 RX ADMIN — Medication 10 ML: at 09:07

## 2022-07-16 RX ADMIN — Medication 10 ML: at 02:07

## 2022-07-16 RX ADMIN — FOLIC ACID 1 MG: 1 TABLET ORAL at 09:07

## 2022-07-16 RX ADMIN — METOPROLOL TARTRATE 25 MG: 25 TABLET, FILM COATED ORAL at 09:07

## 2022-07-16 RX ADMIN — ATORVASTATIN CALCIUM 10 MG: 10 TABLET, FILM COATED ORAL at 09:07

## 2022-07-16 RX ADMIN — POLYETHYLENE GLYCOL 3350 17 G: 17 POWDER, FOR SOLUTION ORAL at 09:07

## 2022-07-16 RX ADMIN — SODIUM CHLORIDE: 0.9 INJECTION, SOLUTION INTRAVENOUS at 06:07

## 2022-07-16 RX ADMIN — MIDODRINE HYDROCHLORIDE 5 MG: 5 TABLET ORAL at 10:07

## 2022-07-16 RX ADMIN — MUPIROCIN: 20 OINTMENT TOPICAL at 09:07

## 2022-07-16 NOTE — PROGRESS NOTES
[facility] - Telemetry ([facility])  [facility] Medicine  Progress Note    Patient Name: Riley Saba  MRN: [id number]  Patient Class: IP- Inpatient   Admission Date: 7/14/2022  Length of Stay: 2 days  Attending Physician: Heath Henderson MD  Primary Care Provider: Bj Clayton MD        Subjective:     Principal Problem:Acute pulmonary embolism        HPI:  81 y/o male with PMHx of HTN, HLD, alzheimers, BPH, SVT, and  who presented to the ED with c/o hypoxia that onset gradually earlier today. Pt was found with O2 sats of 80% by Home Health. Pt does not use home oxygen. Pt was not appearing SOB or using accessory muscles.  Spouse/pt denies: fever, chills, cough, SOB, abd pain, BLE edema, and all other sx at this time.  ED workup shows: WBC 13K, H/H 13/38, , Trop. 0.074, Na 130, Bilirubin 1.3, u/a shows 2+ leukocytes, 10WBC. CTA chest shows: Multifocal bilateral pulmonary emboli as above with findings concerning for right heart strain.  Consider consultation with interventional radiology for thrombolysis.    Moderate bilateral pleural effusions with associated compressive atelectasis.  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  He is a full code and his SDM is his wife  He will be kept on OBS for bilat PE under the care of hospital medicine.        Overview/Hospital Course:  Admitted for further evaluation of hypoxia and increased generalized weakness. Noted to have multifocal gloria pul emboli . Echo was reviewed by IR and suggested no Rt heart strain and IR thrombolysis was not indicated. Venous U/S gloria lower ext showed  DVT within the right superficial femoral vein and bilateral popliteal veins. UA with WBC 10/HPF with indwelling Gramajo catheter. Catheter was exchanged 10 days ago according to wife. Urine culture is requested.     7/15- Pt is awake , oriented to self and person only. H/O dementia. Appears ill and weakened. IR suggested no IR thrombolysis or thrombectomy  indicated. Heparin gtt transitioned to oral Apixaban. Rocephin initiated for UTI.       7/16- Was awake earlier, however somnolent during my visit after PT session. Orthostatic SBP drop noted during PT. Encourage oral fluid intake and Midodrine added. O2 wean down to RA. Disposition - home with HH PT/OT. Apixaban continues.       Interval History:   Orthostatic SBP drop noted during PT. Encourage oral fluid intake and Midodrine added. O2 wean down to RA. Disposition - home with HH PT/OT. Apixaban continues.       Review of Systems   Unable to perform ROS: Dementia   Objective:     Vital Signs (Most Recent):  Temp: 97.8 °F (36.6 °C) (07/16/22 1138)  Pulse: 97 (07/16/22 1314)  Resp: 12 (07/16/22 1138)  BP: 118/65 (07/16/22 1155)  SpO2: 96 % (07/16/22 1138) Vital Signs (24h Range):  Temp:  [97.7 °F (36.5 °C)-98.7 °F (37.1 °C)] 97.8 °F (36.6 °C)  Pulse:  [] 97  Resp:  [12-20] 12  SpO2:  [91 %-98 %] 96 %  BP: ()/() 118/65     Weight: 70.2 kg (154 lb 12.2 oz)  Body mass index is 20.42 kg/m².    Intake/Output Summary (Last 24 hours) at 7/16/2022 1458  Last data filed at 7/16/2022 1300  Gross per 24 hour   Intake --   Output 1800 ml   Net -1800 ml      Physical Exam  Vitals and nursing note reviewed.   Constitutional:       General: He is not in acute distress.     Appearance: He is well-developed. He is not diaphoretic.   HENT:      Head: Normocephalic and atraumatic.      Nose: Nose normal.   Eyes:      General:         Right eye: No discharge.         Left eye: No discharge.      Conjunctiva/sclera: Conjunctivae normal.      Pupils: Pupils are equal, round, and reactive to light.   Neck:      Thyroid: No thyromegaly.      Vascular: No JVD.      Trachea: No tracheal deviation.   Cardiovascular:      Rate and Rhythm: Normal rate and regular rhythm.      Heart sounds: Normal heart sounds. No murmur heard.    No friction rub. No gallop.   Pulmonary:      Effort: Pulmonary effort is normal. No respiratory  distress.      Breath sounds: Normal breath sounds. No wheezing or rales.   Chest:      Chest wall: No tenderness.   Abdominal:      General: Bowel sounds are normal. There is no distension.      Palpations: Abdomen is soft. There is no mass.      Tenderness: There is no abdominal tenderness.   Genitourinary:     Comments: Gramajo cath draining clear, yellow urine  Musculoskeletal:         General: No tenderness or deformity. Normal range of motion.      Cervical back: Normal range of motion and neck supple.   Skin:     General: Skin is warm and dry.      Capillary Refill: Capillary refill takes less than 2 seconds.      Findings: No erythema or rash.   Neurological:      Mental Status: He is alert.      Motor: Weakness present. No abnormal muscle tone.      Coordination: Coordination normal.      Comments: Oriented to self and person only    Psychiatric:         Behavior: Behavior normal.       Significant Labs: All pertinent labs within the past 24 hours have been reviewed.  BMP:   Recent Labs   Lab 07/16/22  0433   *   *   K 4.2   CL 97   CO2 24   BUN 9   CREATININE 0.6   CALCIUM 8.6*   MG 2.0     CBC:   Recent Labs   Lab 07/14/22  1512 07/14/22  1759 07/16/22  0433   WBC 13.10* 12.98* 9.40   HGB 13.7* 12.9* 12.3*   HCT 40.8 38.1* 35.8*    204 213     CMP:   Recent Labs   Lab 07/14/22  1512 07/15/22  0335 07/16/22  0433   * 130* 131*   K 4.7 4.1 4.2   CL 91* 98 97   CO2 27 23 24    111* 113*   BUN 11 9 9   CREATININE 0.7 0.6 0.6   CALCIUM 9.3 8.8 8.6*   PROT 6.6  --   --    ALBUMIN 3.0*  --   --    BILITOT 1.3*  --   --    ALKPHOS 83  --   --    AST 21  --   --    ALT 20  --   --    ANIONGAP 12 9 10   EGFRNONAA >60 >60 >60       Significant Imaging:       Assessment/Plan:      * Acute pulmonary embolism  --CTA chest shows Multifocal bilateral pulmonary emboli as above with findings concerning for right heart strain.    --heparin gtt  --IR consulted for possible thrombolysis  ---  Likely provoked in the setting of recent surgery, multiple hospital admission and being sedentary   7/15-  Echo was reviewed by IR and suggested no Rt heart strain and IR thrombolysis was not indicated.  Heparin gtt transitioned to oral Apixaban       Acute deep vein thrombosis (DVT) of both lower extremities  - Likely provoked in the setting of recent surgery, multiple hospital admission and being sedentary   -Heparin gtt transitioned to oral Apixaban       SVT (supraventricular tachycardia)  --Continue BB  -Dose adjusted to control HR under 100      Essential hypertension  -Monitor BP trend as wife reports orthostatic drop of systolic BP at home   -Orthostatic vitals   -Continue BB  7/16-  -Orthostatic SBP drop noted   -Continue BB for rate control   -Midodrine added to support BP  -Encourage oral fluid intake     Elevated troponin  --likely 2/2 PE  --pt denies CP  --2d echo pending  --trend troponin      BPH (benign prostatic hyperplasia)  --Has indwelling mendes at home since prostate surgery in 6/6/22  --Continue flomax and proscar  --F/U  OP with urology as scheduled      Alzheimer's dementia  --continue namenda, aricept  --supportive care      UTI (urinary tract infection)  - Pt with indwelling mendes catheter   -Catheter was exchanged 10 days ago according to wife   -UA showed WBC 10/HPF  -Urine culture requested   -Rocephin initiated       Leukocytosis  - Reactive vs infectious etiology   -Blood cultures x 2  -UA is suggestive of UTI. Urine culture requested   -Rocephin initiated to cover UTI  7/16-  Resolved         VTE Risk Mitigation (From admission, onward)         Ordered     apixaban tablet 10 mg  2 times daily         07/15/22 1032     IP VTE HIGH RISK PATIENT  Once         07/14/22 2056     Place sequential compression device  Until discontinued         07/14/22 2056     Reason for No Pharmacological VTE Prophylaxis  Once        Question:  Reasons:  Answer:  IV Heparin w/in 24 hrs. Pre or Post-Op     07/14/22 2056                Discharge Planning   ELIZABETH:      Code Status: Full Code   Is the patient medically ready for discharge?:     Reason for patient still in hospital (select all that apply): Patient trending condition  Discharge Plan A: Home Health                  Heath Henderson MD  Department of Hospital Medicine   [facility] ([facility])

## 2022-07-16 NOTE — PLAN OF CARE
KAIN BANDA COMPLETE, PT CURRENTLY REQUIRES ALEX FOR BED MOBILITY, UNABLE TO PROGRESS AT THIS TIME DUE TO ORTHOSTATIC HYPOT, P.T. RECOMMENDS HHPT AT D/C WITH ASSIST OF FAMILY

## 2022-07-16 NOTE — SUBJECTIVE & OBJECTIVE
Oncology Treatment Plan:   [Could not find a treatment plan. This SmartLink may be configured incorrectly. Contact a  for help.]    Medications:  Continuous Infusions:  Scheduled Meds:   apixaban  10 mg Oral BID    atorvastatin  10 mg Oral Daily    cefTRIAXone (ROCEPHIN) IVPB  1 g Intravenous Q24H    docusate sodium  100 mg Oral BID    donepeziL  10 mg Oral Nightly    finasteride  5 mg Oral Daily    folic acid  1 mg Oral Daily    memantine  10 mg Oral BID    metoprolol tartrate  25 mg Oral BID    mupirocin   Nasal BID    polyethylene glycol  17 g Oral Daily    sodium chloride 0.9%  10 mL Intravenous Q8H    tamsulosin  0.4 mg Oral Daily     PRN Meds:acetaminophen, acetaminophen, aluminum-magnesium hydroxide-simethicone, dextrose 10%, dextrose 10%, lactulose, melatonin, morphine, naloxone, ondansetron, oxyCODONE, promethazine, simethicone     Review of patient's allergies indicates:  No Known Allergies     Past Medical History:   Diagnosis Date    Alzheimer's disease, unspecified (CODE)     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  Family History    None       Tobacco Use    Smoking status: Not on file    Smokeless tobacco: Not on file   Substance and Sexual Activity    Alcohol use: Not on file    Drug use: Not on file    Sexual activity: Not on file       Review of Systems   Constitutional:  Positive for activity change and fatigue. Negative for appetite change, chills, diaphoresis, fever and unexpected weight change.   HENT:  Negative for congestion, dental problem, drooling, ear discharge, ear pain, facial swelling, hearing loss, mouth sores, nosebleeds, postnasal drip, rhinorrhea, sinus pressure, sneezing, sore throat, tinnitus, trouble swallowing and voice change.    Eyes:  Negative for photophobia, pain, discharge, redness, itching and  visual disturbance.   Respiratory:  Negative for apnea, cough, choking, chest tightness, shortness of breath, wheezing and stridor.    Cardiovascular:  Negative for chest pain, palpitations and leg swelling.   Gastrointestinal:  Negative for abdominal distention, abdominal pain, anal bleeding, blood in stool, constipation, diarrhea, nausea, rectal pain and vomiting.   Endocrine: Negative for cold intolerance, heat intolerance, polydipsia, polyphagia and polyuria.   Genitourinary:  Negative for decreased urine volume, difficulty urinating, dysuria, enuresis, flank pain, frequency, genital sores, hematuria, penile discharge, penile pain, penile swelling, scrotal swelling, testicular pain and urgency.   Musculoskeletal:  Negative for arthralgias, back pain, gait problem, joint swelling, myalgias, neck pain and neck stiffness.   Skin:  Negative for color change, pallor, rash and wound.   Allergic/Immunologic: Negative for environmental allergies, food allergies and immunocompromised state.   Neurological:  Positive for weakness. Negative for dizziness, tremors, seizures, syncope, facial asymmetry, speech difficulty, light-headedness, numbness and headaches.   Hematological:  Negative for adenopathy. Does not bruise/bleed easily.   Psychiatric/Behavioral:  Positive for confusion, decreased concentration and dysphoric mood. Negative for agitation, behavioral problems, hallucinations, self-injury, sleep disturbance and suicidal ideas. The patient is not nervous/anxious and is not hyperactive.    Objective:     Vital Signs (Most Recent):  Temp: 98.2 °F (36.8 °C) (07/16/22 0709)  Pulse: 88 (07/16/22 0814)  Resp: 16 (07/16/22 0814)  BP: 130/70 (07/16/22 0709)  SpO2: (!) 91 % (07/16/22 0814)   Vital Signs (24h Range):  Temp:  [97.7 °F (36.5 °C)-99.3 °F (37.4 °C)] 98.2 °F (36.8 °C)  Pulse:  [] 88  Resp:  [13-20] 16  SpO2:  [91 %-98 %] 91 %  BP: (110-135)/(58-87) 130/70     Weight: 70.2 kg (154 lb 12.2 oz)  Body mass index  is 20.42 kg/m².  Body surface area is 1.9 meters squared.      Intake/Output Summary (Last 24 hours) at 7/16/2022 0842  Last data filed at 7/16/2022 0100  Gross per 24 hour   Intake 82.46 ml   Output 1950 ml   Net -1867.54 ml       Physical Exam  Vitals reviewed.   Constitutional:       General: He is not in acute distress.     Appearance: He is well-developed. He is ill-appearing. He is not diaphoretic.   HENT:      Head: Normocephalic.      Right Ear: External ear normal.      Left Ear: External ear normal.      Nose: Nose normal.      Right Sinus: No maxillary sinus tenderness or frontal sinus tenderness.      Left Sinus: No maxillary sinus tenderness or frontal sinus tenderness.      Mouth/Throat:      Pharynx: No oropharyngeal exudate.   Eyes:      General: Lids are normal. No scleral icterus.        Right eye: No discharge.         Left eye: No discharge.      Extraocular Movements:      Right eye: Normal extraocular motion.      Left eye: Normal extraocular motion.      Conjunctiva/sclera:      Right eye: Right conjunctiva is not injected. No hemorrhage.     Left eye: Left conjunctiva is not injected. No hemorrhage.     Pupils: Pupils are equal, round, and reactive to light.   Neck:      Thyroid: No thyromegaly.      Vascular: No JVD.      Trachea: No tracheal deviation.   Cardiovascular:      Rate and Rhythm: Normal rate and regular rhythm.      Heart sounds: Normal heart sounds.   Pulmonary:      Effort: Pulmonary effort is normal. No respiratory distress.      Breath sounds: Normal breath sounds. No stridor.   Chest:   Breasts:      Right: No supraclavicular adenopathy.      Left: No supraclavicular adenopathy.     Abdominal:      General: Bowel sounds are normal.      Palpations: Abdomen is soft. There is no hepatomegaly, splenomegaly or mass.      Tenderness: There is no abdominal tenderness.   Musculoskeletal:         General: No tenderness. Normal range of motion.      Cervical back: Normal range of  motion and neck supple.   Lymphadenopathy:      Head:      Right side of head: No posterior auricular or occipital adenopathy.      Left side of head: No posterior auricular or occipital adenopathy.      Cervical: No cervical adenopathy.      Right cervical: No superficial, deep or posterior cervical adenopathy.     Left cervical: No superficial, deep or posterior cervical adenopathy.      Upper Body:      Right upper body: No supraclavicular adenopathy.      Left upper body: No supraclavicular adenopathy.   Skin:     General: Skin is dry.      Findings: No erythema or rash.      Nails: There is no clubbing.   Neurological:      Mental Status: He is alert and oriented to person, place, and time.      Cranial Nerves: No cranial nerve deficit.      Coordination: Coordination normal.   Psychiatric:         Behavior: Behavior normal.         Thought Content: Thought content normal.         Cognition and Memory: Cognition is impaired.         Judgment: Judgment normal.       Significant Labs:   BMP:   Recent Labs   Lab 07/14/22  1512 07/15/22  0335 07/16/22  0433    111* 113*   * 130* 131*   K 4.7 4.1 4.2   CL 91* 98 97   CO2 27 23 24   BUN 11 9 9   CREATININE 0.7 0.6 0.6   CALCIUM 9.3 8.8 8.6*   MG  --   --  2.0   , CBC:   Recent Labs   Lab 07/14/22 1512 07/14/22  1759 07/16/22  0433   WBC 13.10* 12.98* 9.40   HGB 13.7* 12.9* 12.3*   HCT 40.8 38.1* 35.8*    204 213   , CMP:   Recent Labs   Lab 07/14/22  1512 07/15/22  0335 07/16/22  0433   * 130* 131*   K 4.7 4.1 4.2   CL 91* 98 97   CO2 27 23 24    111* 113*   BUN 11 9 9   CREATININE 0.7 0.6 0.6   CALCIUM 9.3 8.8 8.6*   PROT 6.6  --   --    ALBUMIN 3.0*  --   --    BILITOT 1.3*  --   --    ALKPHOS 83  --   --    AST 21  --   --    ALT 20  --   --    ANIONGAP 12 9 10   EGFRNONAA >60 >60 >60   , Coagulation:   Recent Labs   Lab 07/14/22  1759 07/15/22  0046 07/15/22  0826 07/15/22  1544   INR 1.1  --   --   --    APTT 25.1 69.5* 33.3*  45.6*   , Haptoglobin: No results for input(s): HAPTOGLOBIN in the last 48 hours., Immunology: No results for input(s): SPEP, ROSA, JAYSHREE, FREELAMBDALI in the last 48 hours., LDH: No results for input(s): LDHCSF, BFSOURCE in the last 48 hours., LFTs:   Recent Labs   Lab 07/14/22  1512   ALT 20   AST 21   ALKPHOS 83   BILITOT 1.3*   PROT 6.6   ALBUMIN 3.0*   , Reticulocytes: No results for input(s): RETIC in the last 48 hours., Tumor Markers: No results for input(s): PSA, CEA, , AFPTM, IM4121,  in the last 48 hours.    Invalid input(s): ALGTM, Uric Acid No results for input(s): URICACID in the last 48 hours., and Urine Studies:   Recent Labs   Lab 07/14/22  1531   COLORU Yellow   APPEARANCEUA Clear   PHUR 7.0   SPECGRAV 1.020   PROTEINUA Trace*   GLUCUA Negative   KETONESU Negative   BILIRUBINUA Negative   OCCULTUA Negative   NITRITE Negative   UROBILINOGEN 2.0-3.0*   LEUKOCYTESUR 2+*   RBCUA 2   WBCUA 10*       Diagnostic Results:  I have reviewed all pertinent imaging results/findings within the past 24 hours.

## 2022-07-16 NOTE — SUBJECTIVE & OBJECTIVE
Interval History:   Orthostatic SBP drop noted during PT. Encourage oral fluid intake and Midodrine added. O2 wean down to RA. Disposition - home with HH PT/OT. Apixaban continues.       Review of Systems   Unable to perform ROS: Dementia   Objective:     Vital Signs (Most Recent):  Temp: 97.8 °F (36.6 °C) (07/16/22 1138)  Pulse: 97 (07/16/22 1314)  Resp: 12 (07/16/22 1138)  BP: 118/65 (07/16/22 1155)  SpO2: 96 % (07/16/22 1138) Vital Signs (24h Range):  Temp:  [97.7 °F (36.5 °C)-98.7 °F (37.1 °C)] 97.8 °F (36.6 °C)  Pulse:  [] 97  Resp:  [12-20] 12  SpO2:  [91 %-98 %] 96 %  BP: ()/() 118/65     Weight: 70.2 kg (154 lb 12.2 oz)  Body mass index is 20.42 kg/m².    Intake/Output Summary (Last 24 hours) at 7/16/2022 1458  Last data filed at 7/16/2022 1300  Gross per 24 hour   Intake --   Output 1800 ml   Net -1800 ml      Physical Exam  Vitals and nursing note reviewed.   Constitutional:       General: He is not in acute distress.     Appearance: He is well-developed. He is not diaphoretic.   HENT:      Head: Normocephalic and atraumatic.      Nose: Nose normal.   Eyes:      General:         Right eye: No discharge.         Left eye: No discharge.      Conjunctiva/sclera: Conjunctivae normal.      Pupils: Pupils are equal, round, and reactive to light.   Neck:      Thyroid: No thyromegaly.      Vascular: No JVD.      Trachea: No tracheal deviation.   Cardiovascular:      Rate and Rhythm: Normal rate and regular rhythm.      Heart sounds: Normal heart sounds. No murmur heard.    No friction rub. No gallop.   Pulmonary:      Effort: Pulmonary effort is normal. No respiratory distress.      Breath sounds: Normal breath sounds. No wheezing or rales.   Chest:      Chest wall: No tenderness.   Abdominal:      General: Bowel sounds are normal. There is no distension.      Palpations: Abdomen is soft. There is no mass.      Tenderness: There is no abdominal tenderness.   Genitourinary:     Comments: Gram[place]  cath draining clear, yellow urine  Musculoskeletal:         General: No tenderness or deformity. Normal range of motion.      Cervical back: Normal range of motion and neck supple.   Skin:     General: Skin is warm and dry.      Capillary Refill: Capillary refill takes less than 2 seconds.      Findings: No erythema or rash.   Neurological:      Mental Status: He is alert.      Motor: Weakness present. No abnormal muscle tone.      Coordination: Coordination normal.      Comments: Oriented to self and person only    Psychiatric:         Behavior: Behavior normal.       Significant Labs: All pertinent labs within the past 24 hours have been reviewed.  BMP:   Recent Labs   Lab 07/16/22  0433   *   *   K 4.2   CL 97   CO2 24   BUN 9   CREATININE 0.6   CALCIUM 8.6*   MG 2.0     CBC:   Recent Labs   Lab 07/14/22  1512 07/14/22  1759 07/16/22  0433   WBC 13.10* 12.98* 9.40   HGB 13.7* 12.9* 12.3*   HCT 40.8 38.1* 35.8*    204 213     CMP:   Recent Labs   Lab 07/14/22  1512 07/15/22  0335 07/16/22  0433   * 130* 131*   K 4.7 4.1 4.2   CL 91* 98 97   CO2 27 23 24    111* 113*   BUN 11 9 9   CREATININE 0.7 0.6 0.6   CALCIUM 9.3 8.8 8.6*   PROT 6.6  --   --    ALBUMIN 3.0*  --   --    BILITOT 1.3*  --   --    ALKPHOS 83  --   --    AST 21  --   --    ALT 20  --   --    ANIONGAP 12 9 10   EGFRNONAA >60 >60 >60       Significant Imaging:

## 2022-07-16 NOTE — PLAN OF CARE
NIRANJAN. Noted patient appears more alert compared to previous day. Spouse noted patient was talking more and able to eat independently. Elevated HR overnight. Increased PO metoprolol dose. No other events.Continue current plan of care.  Problem: Adult Inpatient Plan of Care  Goal: Plan of Care Review  7/16/2022 0729 by Bennie Madden LPN  Outcome: Ongoing, Progressing  7/16/2022 0310 by Bennie Madden LPN  Outcome: Ongoing, Progressing  Goal: Patient-Specific Goal (Individualized)  7/16/2022 0729 by Bennie Madden LPN  Outcome: Ongoing, Progressing  7/16/2022 0310 by Bennie Madden LPN  Outcome: Ongoing, Progressing  Goal: Absence of Hospital-Acquired Illness or Injury  7/16/2022 0729 by Bennie Madden LPN  Outcome: Ongoing, Progressing  7/16/2022 0310 by Bennie Madden LPN  Outcome: Ongoing, Progressing  Goal: Optimal Comfort and Wellbeing  7/16/2022 0729 by Bennie Madden LPN  Outcome: Ongoing, Progressing  7/16/2022 0310 by Bennie Madden LPN  Outcome: Ongoing, Progressing  Goal: Readiness for Transition of Care  7/16/2022 0729 by Bennie Madden LPN  Outcome: Ongoing, Progressing  7/16/2022 0310 by Bennie Madden LPN  Outcome: Ongoing, Progressing     Problem: Fall Injury Risk  Goal: Absence of Fall and Fall-Related Injury  7/16/2022 0729 by Bennie Madden LPN  Outcome: Ongoing, Progressing  7/16/2022 0310 by Bennie Madden LPN  Outcome: Ongoing, Progressing     Problem: Infection  Goal: Absence of Infection Signs and Symptoms  7/16/2022 0729 by Bennie Madden LPN  Outcome: Ongoing, Progressing  7/16/2022 0310 by Bennie Madden LPN  Outcome: Ongoing, Progressing     Problem: Skin Injury Risk Increased  Goal: Skin Health and Integrity  7/16/2022 0729 by Bennie Madden LPN  Outcome: Ongoing, Progressing  7/16/2022 0310 by Bennie Madden LPN  Outcome: Ongoing, Progressing

## 2022-07-16 NOTE — PT/OT/SLP EVAL
"Occupational Therapy   Evaluation    Name: Riley Saba  MRN: [id number]  Admitting Diagnosis:  Acute pulmonary embolism  Recent Surgery: * No surgery found *      Recommendations:     Discharge Recommendations: home health OT (24/7 SPV and A)  Discharge Equipment Recommendations:  none  Barriers to discharge:  None    Assessment:     Riley Saba is a 82 y.o. male with a medical diagnosis of Acute pulmonary embolism.  He presents with the following performance deficits affecting function: weakness, impaired endurance, impaired self care skills, impaired functional mobility, impaired balance, decreased safety awareness, impaired cognition.      Rehab Prognosis: Fair; patient would benefit from acute skilled OT services to address these deficits and reach maximum level of function.       Plan:     Patient to be seen 2 x/week to address the above listed problems via self-care/home management, therapeutic activities, therapeutic exercises  · Plan of Care Expires: 07/30/22  · Plan of Care Reviewed with: patient, spouse    Subjective     Chief Complaint: Wife reported "He has been getting so much weaker."  Patient/Family Comments/goals: increase functional strength    Occupational Profile:  Living Environment: Patient resides in a 1 story home with 2 steps to enter (no rail) with his wife, sister in law, and nephew (who have been providing 24/7 SPV and A).  Previous level of function: Patient with acute functional decline since June 2022. Was able to ambulate with SPV and complete ADLs with SPV prior to initial hospitalization. Since he has progressed to needed A with all ambulation including use of RW and A with all ADLs.   Roles and Routines: n/a  Equipment Used at Home:  hospital bed, bedside commode, walker, rolling, wheelchair  Assistance upon Discharge: family    Pain/Comfort:  · Pain Rating 1: 0/10    Objective:     Communicated with: NurseJelena, prior to session.  Patient found supine with peripheral " IV, telemetry, mendes catheter, bed alarm upon OT entry to room.    General Precautions: Standard, fall (orthostatic hypotension)   Orthopedic Precautions:N/A   Braces: N/A  Respiratory Status: Room air (placed on RA via RT who checked periodically on patient who never desated lower than 93%).    Bed Mobility:    · Patient completed Supine to Sit with minimum assistance and with side rail  · Patient completed Sit to Supine with minimum assistance and with side rail   · Seated scooting min A  · Supine scooting: CGA    Functional Mobility/Transfers:  · Unable to complete due to orthostatic hypotension.    Activities of Daily Living:  · Upper Body Dressing: moderate assistance .  · Lower Body Dressing: maximal assistance .   · ADL performance limited due to cognition    Cognitive/Visual Perceptual:  Cognitive/Psychosocial Skills:     -       Oriented to: Person   -       Follows Commands/attention:Easily distracted and Follows one-step commands  -       Safety awareness/insight to disability: impaired     Physical Exam:  Balance:    -       sitting: fair  Upper Extremity Range of Motion:     -       Right Upper Extremity: WFL  -       Left Upper Extremity: WFL  Upper Extremity Strength:    -       Right Upper Extremity: Deficits: grossly 3+/5  -       Left Upper Extremity: Deficits: grossly 3+/5   Strength:    -       Right Upper Extremity: Deficits: fair  -       Left Upper Extremity: Deficits: fair    AMPAC 6 Click ADL:  AMPAC Total Score: 14    Treatment & Education:  Patient and wife educated on role of OT in acute setting and benefits of participation. Educated on techniques to use to increase independence and decrease fall risk with functional transfers. Encouraged completion of B UE AROM therex throughout the day to tolerance to increase functional strength and activity tolerance. Patient's wife stated understanding and in agreement with POC.    Patient with + orthostatic hypotension that progressed while  seated EOB  Supine BP: 128/76  Initial sittin/58  Sitting x8 mins 96/60    Patient with difficulty expressing symptoms. As time progressed while seated EOB patient attempted to transition back to supine. Suspect patient with increasing symptoms associated with orthostatic hypotension. Will progress mobility as safely able.  Education:    Patient left supine with all lines intact, call button in reach, bed alarm on and wife and nurse present    GOALS:   Multidisciplinary Problems     Occupational Therapy Goals        Problem: Occupational Therapy    Goal Priority Disciplines Outcome Interventions   Occupational Therapy Goal     OT, PT/OT     Description: Goals to be met by: 22     Patient will increase functional independence with ADLs by performing:    Sitting at edge of bed x15 minutes with Supervision with stable BP.  Supine to sit with Stand-by Assistance.  Increased functional strength in B UE grossly by 1/2 MM grade.                     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    Time Tracking:     OT Date of Treatment: 22  OT Start Time: 805  OT Stop Time: 830  OT Total Time (min): 25 min    Billable Minutes:Evaluation 15  Therapeutic Activity 10    2022

## 2022-07-16 NOTE — PT/OT/SLP EVAL
Physical Therapy Evaluation    Patient Name:  Riley Saba   MRN:  [id number]    Recommendations:     Discharge Recommendations:  home health PT (WITH FAMILY ASSIST)   Discharge Equipment Recommendations: none   Barriers to discharge: None    Assessment:     Riley Saba is a 82 y.o. male admitted with a medical diagnosis of Acute pulmonary embolism.  He presents with the following impairments/functional limitations:  weakness, impaired endurance, impaired functional mobility, gait instability, impaired balance, decreased safety awareness, decreased coordination, impaired cognition .    Rehab Prognosis: Good; patient would benefit from acute skilled PT services to address these deficits and reach maximum level of function.    Recent Surgery: * No surgery found *     Plan:     During this hospitalization, patient to be seen 3 x/week to address the identified rehab impairments via therapeutic activities, therapeutic exercises, gait training and progress toward the following goals:    · Plan of Care Expires:  07/30/22    Subjective     Chief Complaint:   Patient/Family Comments/goals:   Pain/Comfort:  · Pain Rating 1: 0/10    Patients cultural, spiritual, Mandaeism conflicts given the current situation:      Living Environment:  PT IS POOR HISTORIAN BUT WIFE PRESENT TO OFFER PT'S PLOF/HISTORY:  PT LIVES WITH WIFE IN 1 [place] HOUSE 2 STEPS TO ENTER NO RAIL, PT WITH LIMITED GAIT SINCE June PER WIFE, PT WILL AMB WITH ASSIST OF P.T. ONLY AT HOME, DOES NOT DRIVE, REQUIRES ASSIST FOR ADL'S, PT HAS 24 HOUR CARE BW WIFE AND DULCE  Prior to admission, patients level of function was .  Equipment used at home: hospital bed, walker, rolling, wheelchair, bedside commode.  DME owned (not currently used): none.  Upon discharge, patient will have assistance from FAMILY.    Objective:     Communicated with NURSE IBARRA prior to session.  Patient found supine with telemetry, peripheral IV, bed alarm, mendes catheter  upon PT  entry to room.    General Precautions: Standard, fall, aspiration (H/O ALZHEIMER'S DEMENTIA)   Orthopedic Precautions:N/A   Braces: N/A  Respiratory Status: Room air    Exams:  · Cognitive Exam:  Patient is oriented to Person and PT CONFUSED/DISORIENTED BUT ABLE TO FOLLOW COMMANDS WITH VERBAL AND TACTILE CUES  · Postural Exam:  Patient presented with the following abnormalities:    · -       Rounded shoulders  · -       Forward head  · Sensation:    · -       Intact  · RLE ROM: WFL  · RLE Strength: GROSSLY 3+/5  · LLE ROM: WFL  · LLE Strength: GROSSLY 3+/5    Functional Mobility:  · Bed Mobility:     · Rolling Left:  minimum assistance  · Rolling Right: minimum assistance  · Scooting: minimum assistance  · Supine to Sit: minimum assistance  · Sit to Supine: minimum assistance  · Balance: FAIR- SITTING BALANCE AT EOB    Therapeutic Activities and Exercises:   PT AND WIFE EDUCATED IN ROLE OF P.T. AND POC, PT LETHARGIC BUT ABLE TO AROUSE, PT ASSISTED TO EOB WITH ALEX, BP TAKEN IN SUPINE AND SITTING TO R/O ORTHOSTATIC HYPOTENSION.  PT WITH F- SITTING BALANCE.   PT DENIES DIZZINESS AND FEELING POORLY BUT ATTEMPTING TO RETURN TO SUPINE OFTEN WHILE SEATED EOB.   BP TAKEN IN SUPINE: 128/16 WITH O2 SAT OF 91%  BP TAKEN SEATED AT EOB: 117/58 WITH O2 SAT OF 93%  THEN TAKEN 2 MINUTES LATER SEATED AT EOB: 96/60 WITH O2 SAT OF 92%  PT RETURN TO SUPINE WITH ALEX, PT ABLE TO SUPINE SCOOT TOWARD HOB WITH DIRECTION ONLY    AM-PAC 6 CLICK MOBILITY  Total Score:10     Patient left HOB elevated with all lines intact, call button in reach, bed alarm on, NURSE notified, NURSE present and PT SET UP TO EAT BREAKFAST IN BED.    GOALS:   Multidisciplinary Problems     Physical Therapy Goals        Problem: Physical Therapy    Goal Priority Disciplines Outcome Goal Variances Interventions   Physical Therapy Goal     PT, PT/OT      Description: LTG'S TO BE MET IN 14 DAYS (7-30-22)  PT WILL REQUIRE CGA FOR BED MOBILITY  PT WILL REQUIRE CGA FOR  BED<>CHAIR TF'S  PT WILL AMB 50 FEET WITH RW AND CGA                     History:     Past Medical History:   Diagnosis Date    Alzheimer's disease, unspecified (CODE)     BPH without obstruction/lower urinary tract symptoms     Constipation     HLD (hyperlipidemia)     Orthostatic hypotension     Supraventricular tachycardia        No past surgical history on file.    Time Tracking:     PT Received On: 07/16/22  PT Start Time: 0805     PT Stop Time: 0830  PT Total Time (min): 25 min     Billable Minutes: Evaluation 15 and Therapeutic Activity 10    07/16/2022

## 2022-07-16 NOTE — PT/OT/SLP PROGRESS
Speech Language Pathology Treatment    Patient Name:  Riley Saba   MRN:  [id number]  Admitting Diagnosis: Acute pulmonary embolism    Recommendations:                 General Recommendations:  Dysphagia therapy  Diet recommendations:  Puree (Pt/family requesting home diet of pureed solids.), Liquid Diet Level: Thin liquids - IDDSI Level 0 (NO STRAWS)   Aspiration Precautions: Assistance with meals, No straws and Standard aspiration precautions   General Precautions: Standard, aspiration, pureed diet  Communication strategies:  provide increased time to answer    Subjective     Patient alert, cooperative, and seen at bedside with spouse.    Pain/Comfort:  · Pain Rating 1: 0/10  · Pain Rating Post-Intervention 1: 0/10    Respiratory Status: Room air    Objective:     Has the patient been evaluated by SLP for swallowing?   Yes  Keep patient NPO? No   Current Respiratory Status:        Patient tolerated po trials of smooth puree without any overt s/s of aspiration. Slow a-p transit with munching chewing pattern noted. Patient lip pursing at rest. Wife at bedside and educated on aspiration precautions.    Assessment:     Riley Saba is a 82 y.o. male with an SLP diagnosis of Dysphagia.  He presents with oropharyngeal dysphagia. Recommend continue current diet.     Goals:   Multidisciplinary Problems     SLP Goals        Problem: SLP    Goal Priority Disciplines Outcome   SLP Goal     SLP Ongoing, Not Progressing   Description: 1.  Pt will consume the least restrictive PO diet without s/s of dysphagia, given caregiver assist.  --Pt currently consuming pureed solids (IDDSI 4)/thin liquids (IDDSI 0), no straws with strict aspiration precautions.  2.  Rec: MBSS if clinically indicated s/p f/u diet tolerance.                   Plan:     · Patient to be seen:  2 x/week   · Plan of Care expires:  07/22/22  · Plan of Care reviewed with:  spouse   · SLP Follow-Up:  Yes       Discharge recommendations:  home with home  health     Time Tracking:     SLP Treatment Date:   07/16/22  Speech Start Time:  1058  Speech Stop Time:  1116     Speech Total Time (min):  18 min    Billable Minutes: Treatment Swallowing Dysfunction 11    07/16/2022

## 2022-07-16 NOTE — HPI
82-year-old male admitted to the hospital with unprovoked DVT as well as pulmonary embolus.  Patient is currently on Eliquis 10 mg p.o. b.i.d. for 7 days followed by 5 mg p.o. b.i.d. the asked to see the patient for further evaluation and treatment recommendations

## 2022-07-16 NOTE — ASSESSMENT & PLAN NOTE
Acute unprovoked pulmonary embolus.  Agree with current plan course of action with Eliquis 10 mg p.o. b.i.d. for 7 days followed by 5 mg p.o. b.i.d..; will assess in the outpatient setting in approximately 2-3 months for continuation of treatment.  And any additional workup that would be warranted once family is aware and discussed with them

## 2022-07-16 NOTE — PLAN OF CARE
Patient and wife updated on plan of care. Patient on room air with SpO2 > 93%. Hourly rounding performed, bed locked, bed alarm on, side rails up, and in low position.  Education provided to wife, questions answered as of now. Patient NSR-ST on telemetry. Patient denies pains.  Patient remained free from falls during shift. Will continue to monitor per unit practice/policy. Jelena Patton RN.

## 2022-07-16 NOTE — PLAN OF CARE
OT eval completed. Sup>sit min A, orthostatic hypotension while seated EOB, sit>sup min A, supine scooting CGA. Recommending HHOT and 24/7 SPV at d/c.

## 2022-07-16 NOTE — RESPIRATORY THERAPY
Home Oxygen Evaluation - [facility] - Cardiopulmonary Department      Date Performed: 7/16/2022      1) Patient's Home O2 Sat on room air, while at rest: Room Air SpO2 At Rest: 94 %        If O2 sats on room air at rest are 88% or below, patient qualifies.  Document O2 liter flow needed in Step 2.  If O2 sats are 89% or above, complete Step 3.        2)  If patient is not ambulated and O2 sats are <88%, what is the O2 liter flow required to meet ordered saturation? Home O2 Eval Comments: pt does not qualify    If O2 sats on room air while exercising remain 89% or above patient does not qualify, no further testing needed Document N/A in step 3. If O2 sats on room air while exercising are 88% or below, continue to Step 4.    3) Patient's O2 Sat on room air while exercising: Room Air SpO2 During Ambulation: 93 %        4) Patient's O2 Sat while exercising on O2:   at           (Must show improvement from #4 for patients to qualify)

## 2022-07-16 NOTE — ASSESSMENT & PLAN NOTE
-Monitor BP trend as wife reports orthostatic drop of systolic BP at home   -Orthostatic vitals   -Continue BB  7/16-  -Orthostatic SBP drop noted   -Continue BB for rate control   -Midodrine added to support BP  -Encourage oral fluid intake

## 2022-07-16 NOTE — PLAN OF CARE
NIRANJAN. Patient appears more alert this evening compared to previous shift. Significant other reports patient ate well during day shift. Patient no longer on heparin infusion, switched to PO anticoagulant. Speech therapy saw patient on day shift and recommended dysphagia diet. Heart rate elevated towards beginning of shift.   Problem: Adult Inpatient Plan of Care  Goal: Plan of Care Review  Outcome: Ongoing, Progressing  Goal: Patient-Specific Goal (Individualized)  Outcome: Ongoing, Progressing  Goal: Absence of Hospital-Acquired Illness or Injury  Outcome: Ongoing, Progressing  Goal: Optimal Comfort and Wellbeing  Outcome: Ongoing, Progressing  Goal: Readiness for Transition of Care  Outcome: Ongoing, Progressing     Problem: Fall Injury Risk  Goal: Absence of Fall and Fall-Related Injury  Outcome: Ongoing, Progressing     Problem: Infection  Goal: Absence of Infection Signs and Symptoms  Outcome: Ongoing, Progressing     Problem: Skin Injury Risk Increased  Goal: Skin Health and Integrity  Outcome: Ongoing, Progressing

## 2022-07-16 NOTE — CONSULTS
[facility] - Telemetry ([facility])  Hematology/Oncology  Consult Note    Patient Name: Riley Saba  MRN: [id number]  Admission Date: 7/14/2022  Hospital Length of Stay: 2 days  Code Status: Full Code   Attending Provider: Heath Henderson MD  Consulting Provider: Ahmet Gill MD  Primary Care Physician: Bj Clayton MD  Principal Problem:Acute pulmonary embolism    Consults  Subjective:     HPI:  82-year-old male admitted to the hospital with unprovoked DVT as well as pulmonary embolus.  Patient is currently on Eliquis 10 mg p.o. b.i.d. for 7 days followed by 5 mg p.o. b.i.d. the asked to see the patient for further evaluation and treatment recommendations      Oncology Treatment Plan:   [Could not find a treatment plan. This SmartLink may be configured incorrectly. Contact a  for help.]    Medications:  Continuous Infusions:  Scheduled Meds:   apixaban  10 mg Oral BID    atorvastatin  10 mg Oral Daily    cefTRIAXone (ROCEPHIN) IVPB  1 g Intravenous Q24H    docusate sodium  100 mg Oral BID    donepeziL  10 mg Oral Nightly    finasteride  5 mg Oral Daily    folic acid  1 mg Oral Daily    memantine  10 mg Oral BID    metoprolol tartrate  25 mg Oral BID    mupirocin   Nasal BID    polyethylene glycol  17 g Oral Daily    sodium chloride 0.9%  10 mL Intravenous Q8H    tamsulosin  0.4 mg Oral Daily     PRN Meds:acetaminophen, acetaminophen, aluminum-magnesium hydroxide-simethicone, dextrose 10%, dextrose 10%, lactulose, melatonin, morphine, naloxone, ondansetron, oxyCODONE, promethazine, simethicone     Review of patient's allergies indicates:  No Known Allergies     Past Medical History:   Diagnosis Date    Alzheimer's disease, unspecified (CODE)     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  Family History    None        Tobacco Use    Smoking status: Not on file    Smokeless tobacco: Not on file   Substance and Sexual Activity    Alcohol use: Not on file    Drug use: Not on file    Sexual activity: Not on file       Review of Systems   Constitutional:  Positive for activity change and fatigue. Negative for appetite change, chills, diaphoresis, fever and unexpected weight change.   HENT:  Negative for congestion, dental problem, drooling, ear discharge, ear pain, facial swelling, hearing loss, mouth sores, nosebleeds, postnasal drip, rhinorrhea, sinus pressure, sneezing, sore throat, tinnitus, trouble swallowing and voice change.    Eyes:  Negative for photophobia, pain, discharge, redness, itching and visual disturbance.   Respiratory:  Negative for apnea, cough, choking, chest tightness, shortness of breath, wheezing and stridor.    Cardiovascular:  Negative for chest pain, palpitations and leg swelling.   Gastrointestinal:  Negative for abdominal distention, abdominal pain, anal bleeding, blood in stool, constipation, diarrhea, nausea, rectal pain and vomiting.   Endocrine: Negative for cold intolerance, heat intolerance, polydipsia, polyphagia and polyuria.   Genitourinary:  Negative for decreased urine volume, difficulty urinating, dysuria, enuresis, flank pain, frequency, genital sores, hematuria, penile discharge, penile pain, penile swelling, scrotal swelling, testicular pain and urgency.   Musculoskeletal:  Negative for arthralgias, back pain, gait problem, joint swelling, myalgias, neck pain and neck stiffness.   Skin:  Negative for color change, pallor, rash and wound.   Allergic/Immunologic: Negative for environmental allergies, food allergies and immunocompromised state.   Neurological:  Positive for weakness. Negative for dizziness, tremors, seizures, syncope, facial asymmetry, speech difficulty, light-headedness, numbness and headaches.   Hematological:  Negative for adenopathy. Does not bruise/bleed easily.    Psychiatric/Behavioral:  Positive for confusion, decreased concentration and dysphoric mood. Negative for agitation, behavioral problems, hallucinations, self-injury, sleep disturbance and suicidal ideas. The patient is not nervous/anxious and is not hyperactive.    Objective:     Vital Signs (Most Recent):  Temp: 98.2 °F (36.8 °C) (07/16/22 0709)  Pulse: 88 (07/16/22 0814)  Resp: 16 (07/16/22 0814)  BP: 130/70 (07/16/22 0709)  SpO2: (!) 91 % (07/16/22 0814)   Vital Signs (24h Range):  Temp:  [97.7 °F (36.5 °C)-99.3 °F (37.4 °C)] 98.2 °F (36.8 °C)  Pulse:  [] 88  Resp:  [13-20] 16  SpO2:  [91 %-98 %] 91 %  BP: (110-135)/(58-87) 130/70     Weight: 70.2 kg (154 lb 12.2 oz)  Body mass index is 20.42 kg/m².  Body surface area is 1.9 meters squared.      Intake/Output Summary (Last 24 hours) at 7/16/2022 0842  Last data filed at 7/16/2022 0100  Gross per 24 hour   Intake 82.46 ml   Output 1950 ml   Net -1867.54 ml       Physical Exam  Vitals reviewed.   Constitutional:       General: He is not in acute distress.     Appearance: He is well-developed. He is ill-appearing. He is not diaphoretic.   HENT:      Head: Normocephalic.      Right Ear: External ear normal.      Left Ear: External ear normal.      Nose: Nose normal.      Right Sinus: No maxillary sinus tenderness or frontal sinus tenderness.      Left Sinus: No maxillary sinus tenderness or frontal sinus tenderness.      Mouth/Throat:      Pharynx: No oropharyngeal exudate.   Eyes:      General: Lids are normal. No scleral icterus.        Right eye: No discharge.         Left eye: No discharge.      Extraocular Movements:      Right eye: Normal extraocular motion.      Left eye: Normal extraocular motion.      Conjunctiva/sclera:      Right eye: Right conjunctiva is not injected. No hemorrhage.     Left eye: Left conjunctiva is not injected. No hemorrhage.     Pupils: Pupils are equal, round, and reactive to light.   Neck:      Thyroid: No thyromegaly.       Vascular: No JVD.      Trachea: No tracheal deviation.   Cardiovascular:      Rate and Rhythm: Normal rate and regular rhythm.      Heart sounds: Normal heart sounds.   Pulmonary:      Effort: Pulmonary effort is normal. No respiratory distress.      Breath sounds: Normal breath sounds. No stridor.   Chest:   Breasts:      Right: No supraclavicular adenopathy.      Left: No supraclavicular adenopathy.     Abdominal:      General: Bowel sounds are normal.      Palpations: Abdomen is soft. There is no hepatomegaly, splenomegaly or mass.      Tenderness: There is no abdominal tenderness.   Musculoskeletal:         General: No tenderness. Normal range of motion.      Cervical back: Normal range of motion and neck supple.   Lymphadenopathy:      Head:      Right side of head: No posterior auricular or occipital adenopathy.      Left side of head: No posterior auricular or occipital adenopathy.      Cervical: No cervical adenopathy.      Right cervical: No superficial, deep or posterior cervical adenopathy.     Left cervical: No superficial, deep or posterior cervical adenopathy.      Upper Body:      Right upper body: No supraclavicular adenopathy.      Left upper body: No supraclavicular adenopathy.   Skin:     General: Skin is dry.      Findings: No erythema or rash.      Nails: There is no clubbing.   Neurological:      Mental Status: He is alert and oriented to person, place, and time.      Cranial Nerves: No cranial nerve deficit.      Coordination: Coordination normal.   Psychiatric:         Behavior: Behavior normal.         Thought Content: Thought content normal.         Cognition and Memory: Cognition is impaired.         Judgment: Judgment normal.       Significant Labs:   BMP:   Recent Labs   Lab 07/14/22  1512 07/15/22  0335 07/16/22  0433    111* 113*   * 130* 131*   K 4.7 4.1 4.2   CL 91* 98 97   CO2 27 23 24   BUN 11 9 9   CREATININE 0.7 0.6 0.6   CALCIUM 9.3 8.8 8.6*   MG  --   --  2.0   ,  CBC:   Recent Labs   Lab 07/14/22  1512 07/14/22  1759 07/16/22  0433   WBC 13.10* 12.98* 9.40   HGB 13.7* 12.9* 12.3*   HCT 40.8 38.1* 35.8*    204 213   , CMP:   Recent Labs   Lab 07/14/22  1512 07/15/22  0335 07/16/22  0433   * 130* 131*   K 4.7 4.1 4.2   CL 91* 98 97   CO2 27 23 24    111* 113*   BUN 11 9 9   CREATININE 0.7 0.6 0.6   CALCIUM 9.3 8.8 8.6*   PROT 6.6  --   --    ALBUMIN 3.0*  --   --    BILITOT 1.3*  --   --    ALKPHOS 83  --   --    AST 21  --   --    ALT 20  --   --    ANIONGAP 12 9 10   EGFRNONAA >60 >60 >60   , Coagulation:   Recent Labs   Lab 07/14/22  1759 07/15/22  0046 07/15/22  0826 07/15/22  1544   INR 1.1  --   --   --    APTT 25.1 69.5* 33.3* 45.6*   , Haptoglobin: No results for input(s): HAPTOGLOBIN in the last 48 hours., Immunology: No results for input(s): SPEP, ROSA, JAYSHREE, FREELAMBDALI in the last 48 hours., LDH: No results for input(s): LDHCSF, BFSOURCE in the last 48 hours., LFTs:   Recent Labs   Lab 07/14/22  1512   ALT 20   AST 21   ALKPHOS 83   BILITOT 1.3*   PROT 6.6   ALBUMIN 3.0*   , Reticulocytes: No results for input(s): RETIC in the last 48 hours., Tumor Markers: No results for input(s): PSA, CEA, , AFPTM, ZR2608,  in the last 48 hours.    Invalid input(s): ALGTM, Uric Acid No results for input(s): URICACID in the last 48 hours., and Urine Studies:   Recent Labs   Lab 07/14/22  1531   COLORU Yellow   APPEARANCEUA Clear   PHUR 7.0   SPECGRAV 1.020   PROTEINUA Trace*   GLUCUA Negative   KETONESU Negative   BILIRUBINUA Negative   OCCULTUA Negative   NITRITE Negative   UROBILINOGEN 2.0-3.0*   LEUKOCYTESUR 2+*   RBCUA 2   WBCUA 10*       Diagnostic Results:  I have reviewed all pertinent imaging results/findings within the past 24 hours.    Assessment/Plan:     * Acute pulmonary embolism  Acute unprovoked pulmonary embolus.  Agree with current plan course of action with Eliquis 10 mg p.o. b.i.d. for 7 days followed by 5 mg p.o. b.i.d..; will  assess in the outpatient setting in approximately 2-3 months for continuation of treatment.  And any additional workup that would be warranted once family is aware and discussed with them    SVT (supraventricular tachycardia)  Cared for by primary care team    Essential hypertension  Noted in cared for by primary care team    Alzheimer's dementia  Noted in cared for by primary care team        Thank you for your consult. I will follow-up with patient. Please contact us if you have any additional questions.    Ahmet Gill MD  Hematology/Oncology  [facility] - Telemetry ([facility])

## 2022-07-16 NOTE — ASSESSMENT & PLAN NOTE
- Reactive vs infectious etiology   -Blood cultures x 2  -UA is suggestive of UTI. Urine culture requested   -Rocephin initiated to cover UTI  7/16-  Resolved

## 2022-07-17 LAB
ANION GAP SERPL CALC-SCNC: 10 MMOL/L (ref 8–16)
BACTERIA UR CULT: NO GROWTH
BUN SERPL-MCNC: 11 MG/DL (ref 8–23)
CALCIUM SERPL-MCNC: 8.4 MG/DL (ref 8.7–10.5)
CHLORIDE SERPL-SCNC: 97 MMOL/L (ref 95–110)
CO2 SERPL-SCNC: 25 MMOL/L (ref 23–29)
CREAT SERPL-MCNC: 0.7 MG/DL (ref 0.5–1.4)
EST. GFR  (AFRICAN AMERICAN): >60 ML/MIN/1.73 M^2
EST. GFR  (NON AFRICAN AMERICAN): >60 ML/MIN/1.73 M^2
GLUCOSE SERPL-MCNC: 111 MG/DL (ref 70–110)
MAGNESIUM SERPL-MCNC: 2 MG/DL (ref 1.6–2.6)
PHOSPHATE SERPL-MCNC: 3 MG/DL (ref 2.7–4.5)
POTASSIUM SERPL-SCNC: 4.1 MMOL/L (ref 3.5–5.1)
SODIUM SERPL-SCNC: 132 MMOL/L (ref 136–145)

## 2022-07-17 PROCEDURE — 25000003 PHARM REV CODE 250: Performed by: NURSE PRACTITIONER

## 2022-07-17 PROCEDURE — 80048 BASIC METABOLIC PNL TOTAL CA: CPT | Performed by: NURSE PRACTITIONER

## 2022-07-17 PROCEDURE — 94761 N-INVAS EAR/PLS OXIMETRY MLT: CPT

## 2022-07-17 PROCEDURE — 25000003 PHARM REV CODE 250: Performed by: INTERNAL MEDICINE

## 2022-07-17 PROCEDURE — 92526 ORAL FUNCTION THERAPY: CPT

## 2022-07-17 PROCEDURE — A4216 STERILE WATER/SALINE, 10 ML: HCPCS | Performed by: NURSE PRACTITIONER

## 2022-07-17 PROCEDURE — 21400001 HC TELEMETRY ROOM

## 2022-07-17 PROCEDURE — 99232 SBSQ HOSP IP/OBS MODERATE 35: CPT | Mod: ,,, | Performed by: INTERNAL MEDICINE

## 2022-07-17 PROCEDURE — 36415 COLL VENOUS BLD VENIPUNCTURE: CPT | Performed by: NURSE PRACTITIONER

## 2022-07-17 PROCEDURE — 84100 ASSAY OF PHOSPHORUS: CPT | Performed by: INTERNAL MEDICINE

## 2022-07-17 PROCEDURE — 63600175 PHARM REV CODE 636 W HCPCS: Performed by: INTERNAL MEDICINE

## 2022-07-17 PROCEDURE — 97530 THERAPEUTIC ACTIVITIES: CPT | Mod: CQ

## 2022-07-17 PROCEDURE — 83735 ASSAY OF MAGNESIUM: CPT | Performed by: INTERNAL MEDICINE

## 2022-07-17 PROCEDURE — 99232 PR SUBSEQUENT HOSPITAL CARE,LEVL II: ICD-10-PCS | Mod: ,,, | Performed by: INTERNAL MEDICINE

## 2022-07-17 RX ORDER — TAMSULOSIN HYDROCHLORIDE 0.4 MG/1
0.4 CAPSULE ORAL NIGHTLY
Status: DISCONTINUED | OUTPATIENT
Start: 2022-07-18 | End: 2022-07-18 | Stop reason: HOSPADM

## 2022-07-17 RX ADMIN — MUPIROCIN: 20 OINTMENT TOPICAL at 08:07

## 2022-07-17 RX ADMIN — Medication 10 ML: at 03:07

## 2022-07-17 RX ADMIN — FOLIC ACID 1 MG: 1 TABLET ORAL at 08:07

## 2022-07-17 RX ADMIN — FINASTERIDE 5 MG: 5 TABLET, FILM COATED ORAL at 08:07

## 2022-07-17 RX ADMIN — APIXABAN 10 MG: 2.5 TABLET, FILM COATED ORAL at 08:07

## 2022-07-17 RX ADMIN — DOCUSATE SODIUM 100 MG: 100 CAPSULE, LIQUID FILLED ORAL at 09:07

## 2022-07-17 RX ADMIN — METOPROLOL TARTRATE 25 MG: 25 TABLET, FILM COATED ORAL at 08:07

## 2022-07-17 RX ADMIN — ATORVASTATIN CALCIUM 10 MG: 10 TABLET, FILM COATED ORAL at 08:07

## 2022-07-17 RX ADMIN — POLYETHYLENE GLYCOL 3350 17 G: 17 POWDER, FOR SOLUTION ORAL at 08:07

## 2022-07-17 RX ADMIN — DOCUSATE SODIUM 100 MG: 100 CAPSULE, LIQUID FILLED ORAL at 08:07

## 2022-07-17 RX ADMIN — MIDODRINE HYDROCHLORIDE 5 MG: 5 TABLET ORAL at 05:07

## 2022-07-17 RX ADMIN — MIDODRINE HYDROCHLORIDE 5 MG: 5 TABLET ORAL at 07:07

## 2022-07-17 RX ADMIN — METOPROLOL TARTRATE 25 MG: 25 TABLET, FILM COATED ORAL at 09:07

## 2022-07-17 RX ADMIN — MIDODRINE HYDROCHLORIDE 5 MG: 5 TABLET ORAL at 11:07

## 2022-07-17 RX ADMIN — APIXABAN 10 MG: 2.5 TABLET, FILM COATED ORAL at 09:07

## 2022-07-17 RX ADMIN — CEFTRIAXONE 1 G: 1 INJECTION, SOLUTION INTRAVENOUS at 03:07

## 2022-07-17 NOTE — PT/OT/SLP PROGRESS
Speech Language Pathology Treatment    Patient Name:  Riley Saba   MRN:  [id number]  Admitting Diagnosis: Acute pulmonary embolism    Recommendations:                 General Recommendations:  Dysphagia therapy  Diet recommendations:  Puree Diet - IDDSI Level 4, Liquid Diet Level: Thin liquids - IDDSI Level 0   Aspiration Precautions: 1 bite/sip at a time, Alternating bites/sips, Assistance with meals, HOB to 90 degrees, Meds crushed in puree, Remain upright 30 minutes post meal, Small bites/sips and Standard aspiration precautions   General Precautions: Standard, aspiration, pureed diet  Communication strategies:  provide increased time to answer    Subjective     Patient alert, cooperative, and seen at bedside with spouse.    Pain/Comfort:  · Pain Rating 1: 0/10  · Pain Rating Post-Intervention 1: 0/10    Respiratory Status: Room air    Objective:     Has the patient been evaluated by SLP for swallowing?   Yes  Keep patient NPO? No   Current Respiratory Status:        Observed po intake of lunch consisting of puree solids and thin liquids via cup. Wife assisted with intake. Patient with extended oral prep, impaired a-p transit, and oral residue requiring cues for liquid wash to clear. He exhibited an immediate nonproductive cough with large sip of liquid. He tolerated smaller, monitored intake of liquids without any overt s/s of aspiration. Wife educated on diet recommendations and dysphagia strategies to reduce risk of aspiration.    Assessment:     Riley Saba is a 82 y.o. male with an SLP diagnosis of Dysphagia.  He presents with oropharyngeal dysphagia characterized by extended oral prep, impaired a-p transit, oral residue, and impaired airway protection.   Diet recommendations Puree Diet - IDDSI Level 4   Liquid Diet Level: Thin liquids - IDDSI Level 0  Goals:   Multidisciplinary Problems     SLP Goals        Problem: SLP    Goal Priority Disciplines Outcome   SLP Goal     SLP Ongoing, Not  Progressing   Description: 1.  Pt will consume the least restrictive PO diet without s/s of dysphagia, given caregiver assist.  --Pt currently consuming pureed solids (IDDSI 4)/thin liquids (IDDSI 0), no straws with strict aspiration precautions.  2.  Rec: MBSS if clinically indicated s/p f/u diet tolerance.                   Plan:     · Patient to be seen:  2 x/week   · Plan of Care expires:  07/22/22  · Plan of Care reviewed with:  spouse   · SLP Follow-Up:  Yes       Discharge recommendations:  nursing facility, skilled     Time Tracking:     SLP Treatment Date:   07/17/22  Speech Start Time:  1141  Speech Stop Time:  1158     Speech Total Time (min):  17 min    Billable Minutes: Treatment Swallowing Dysfunction 14    07/17/2022

## 2022-07-17 NOTE — ASSESSMENT & PLAN NOTE
-Monitor BP trend as wife reports orthostatic drop of systolic BP at home   -Orthostatic vitals   -Continue BB  7/16-  -Orthostatic SBP drop noted   -Continue BB for rate control   -Midodrine added to support BP  -Encourage oral fluid intake   -Flomax could certainly play a role for orthostatic hypotension

## 2022-07-17 NOTE — SUBJECTIVE & OBJECTIVE
Interval History:    BP improved with IVF and Midodrine as well as Flomax held this morning . No orthostatic drop noted with standing during PT session.     Review of Systems   Unable to perform ROS: Dementia   Objective:     Vital Signs (Most Recent):  Temp: 98.8 °F (37.1 °C) (07/17/22 1115)  Pulse: 91 (07/17/22 1135)  Resp: 13 (07/17/22 1115)  BP: 137/71 (07/17/22 1115)  SpO2: 98 % (07/17/22 1115) Vital Signs (24h Range):  Temp:  [97.5 °F (36.4 °C)-98.8 °F (37.1 °C)] 98.8 °F (37.1 °C)  Pulse:  [] 91  Resp:  [13-18] 13  SpO2:  [93 %-98 %] 98 %  BP: ()/(45-83) 137/71     Weight: 67.4 kg (148 lb 9.4 oz)  Body mass index is 19.6 kg/m².    Intake/Output Summary (Last 24 hours) at 7/17/2022 1410  Last data filed at 7/17/2022 0900  Gross per 24 hour   Intake 1478.24 ml   Output 2625 ml   Net -1146.76 ml      Physical Exam  Vitals and nursing note reviewed.   Constitutional:       General: He is not in acute distress.     Appearance: He is well-developed. He is not diaphoretic.   HENT:      Head: Normocephalic and atraumatic.      Nose: Nose normal.   Eyes:      General:         Right eye: No discharge.         Left eye: No discharge.      Conjunctiva/sclera: Conjunctivae normal.      Pupils: Pupils are equal, round, and reactive to light.   Neck:      Thyroid: No thyromegaly.      Vascular: No JVD.      Trachea: No tracheal deviation.   Cardiovascular:      Rate and Rhythm: Normal rate and regular rhythm.      Heart sounds: Normal heart sounds. No murmur heard.    No friction rub. No gallop.   Pulmonary:      Effort: Pulmonary effort is normal. No respiratory distress.      Breath sounds: Normal breath sounds. No wheezing or rales.   Chest:      Chest wall: No tenderness.   Abdominal:      General: Bowel sounds are normal. There is no distension.      Palpations: Abdomen is soft. There is no mass.      Tenderness: There is no abdominal tenderness.   Genitourinary:     Comments: Gramajo cath draining clear,  yellow urine  Musculoskeletal:         General: No tenderness or deformity. Normal range of motion.      Cervical back: Normal range of motion and neck supple.   Skin:     General: Skin is warm and dry.      Capillary Refill: Capillary refill takes less than 2 seconds.      Findings: No erythema or rash.   Neurological:      Mental Status: He is alert.      Motor: Weakness present. No abnormal muscle tone.      Coordination: Coordination normal.      Comments: Oriented to self and person only    Psychiatric:         Behavior: Behavior normal.       Significant Labs: All pertinent labs within the past 24 hours have been reviewed.  BMP:   Recent Labs   Lab 07/17/22  0443   *   *   K 4.1   CL 97   CO2 25   BUN 11   CREATININE 0.7   CALCIUM 8.4*   MG 2.0       Significant Imaging:

## 2022-07-17 NOTE — PLAN OF CARE
ATIYA spoke with Mrs. Garcia regarding SNF.Mrs. Garcia asked for [facility]. Pt does not qualify for rehab. SW spoke pt's spouse again and she said the paper say [facility]. SW advised pt's spouse to continue to look on list and provide other options incase [facility] do not accept. Pt's spouse said she will contact her daughter.

## 2022-07-17 NOTE — ASSESSMENT & PLAN NOTE
--CTA chest shows Multifocal bilateral pulmonary emboli as above with findings concerning for right heart strain.    --heparin gtt  --IR consulted for possible thrombolysis  --- Likely provoked in the setting of recent surgery, multiple hospital admission and being sedentary   7/15-  Echo was reviewed by IR and suggested no Rt heart strain and IR thrombolysis was not indicated.  Heparin gtt transitioned to oral Apixaban   7/17-  Continue Apixaban 10 mg one po bid x 1 week, then 5 mg bid for 3 to 6 mos or longer per Hem/Onc discretion   Seems it is a provoked VTE given recent surgery , multiple hospitalization and being bed bound since prostate surgery   Hem/Onc consulted

## 2022-07-17 NOTE — SUBJECTIVE & OBJECTIVE
Interval History:  Patient had a restful night was able to talk with wife at bedside this morning    Oncology Treatment Plan:   [Could not find a treatment plan. This SmartLink may be configured incorrectly. Contact a  for help.]    Medications:  Continuous Infusions:   sodium chloride 0.9% 75 mL/hr at 07/17/22 0421     Scheduled Meds:   apixaban  10 mg Oral BID    atorvastatin  10 mg Oral Daily    cefTRIAXone (ROCEPHIN) IVPB  1 g Intravenous Q24H    docusate sodium  100 mg Oral BID    donepeziL  10 mg Oral Nightly    finasteride  5 mg Oral Daily    folic acid  1 mg Oral Daily    memantine  10 mg Oral BID    metoprolol tartrate  25 mg Oral BID    midodrine  5 mg Oral TID WM    mupirocin   Nasal BID    polyethylene glycol  17 g Oral Daily    sodium chloride 0.9%  10 mL Intravenous Q8H    tamsulosin  0.4 mg Oral Daily     PRN Meds:acetaminophen, acetaminophen, aluminum-magnesium hydroxide-simethicone, dextrose 10%, dextrose 10%, lactulose, melatonin, morphine, naloxone, ondansetron, oxyCODONE, promethazine, simethicone     Review of Systems   Constitutional:  Positive for activity change and fatigue.   Musculoskeletal:  Positive for gait problem and joint swelling.   Neurological:  Positive for weakness.   Psychiatric/Behavioral:  Positive for confusion and dysphoric mood. The patient is nervous/anxious.    Objective:     Vital Signs (Most Recent):  Temp: 98 °F (36.7 °C) (07/17/22 0736)  Pulse: 90 (07/17/22 0736)  Resp: 16 (07/17/22 0736)  BP: 127/69 (07/17/22 0736)  SpO2: 96 % (07/17/22 0736) Vital Signs (24h Range):  Temp:  [97.5 °F (36.4 °C)-98.6 °F (37 °C)] 98 °F (36.7 °C)  Pulse:  [] 90  Resp:  [12-18] 16  SpO2:  [91 %-97 %] 96 %  BP: ()/() 127/69     Weight: 67.4 kg (148 lb 9.4 oz)  Body mass index is 19.6 kg/m².  Body surface area is 1.86 meters squared.      Intake/Output Summary (Last 24 hours) at 7/17/2022 0804  Last data filed at 7/17/2022 0500  Gross per 24 hour    Intake 1558.24 ml   Output 2475 ml   Net -916.76 ml       Physical Exam  Constitutional:       Appearance: He is cachectic. He is ill-appearing.   Neurological:      Motor: Weakness present.      Coordination: Coordination abnormal.      Gait: Gait abnormal.       Significant Labs:   BMP:   Recent Labs   Lab 07/16/22 0433 07/17/22 0443   * 111*   * 132*   K 4.2 4.1   CL 97 97   CO2 24 25   BUN 9 11   CREATININE 0.6 0.7   CALCIUM 8.6* 8.4*   MG 2.0 2.0   , CBC:   Recent Labs   Lab 07/16/22 0433   WBC 9.40   HGB 12.3*   HCT 35.8*      , CMP:   Recent Labs   Lab 07/16/22 0433 07/17/22 0443   * 132*   K 4.2 4.1   CL 97 97   CO2 24 25   * 111*   BUN 9 11   CREATININE 0.6 0.7   CALCIUM 8.6* 8.4*   ANIONGAP 10 10   EGFRNONAA >60 >60   , Coagulation:   Recent Labs   Lab 07/15/22  0826 07/15/22  1544   APTT 33.3* 45.6*   , Haptoglobin: No results for input(s): HAPTOGLOBIN in the last 48 hours., Immunology: No results for input(s): SPEP, ROSA, JAYSHREE, FREELAMBDALI in the last 48 hours., LDH: No results for input(s): LDHCSF, BFSOURCE in the last 48 hours., LFTs: No results for input(s): ALT, AST, ALKPHOS, BILITOT, PROT, ALBUMIN in the last 48 hours., Reticulocytes: No results for input(s): RETIC in the last 48 hours., Tumor Markers: No results for input(s): PSA, CEA, , AFPTM, [id number],  in the last 48 hours.    Invalid input(s): ALGTM, and Uric Acid No results for input(s): URICACID in the last 48 hours.    Diagnostic Results:  I have reviewed all pertinent imaging results/findings within the past 24 hours.

## 2022-07-17 NOTE — PROGRESS NOTES
[facility])  Hematology/Oncology  Progress Note    Patient Name: Riley Saba  Admission Date: 7/14/2022  Hospital Length of Stay: 3 days  Code Status: Full Code     Subjective:     HPI:  82-year-old male admitted to the hospital with unprovoked DVT as well as pulmonary embolus.  Patient is currently on Eliquis 10 mg p.o. b.i.d. for 7 days followed by 5 mg p.o. b.i.d. the asked to see the patient for further evaluation and treatment recommendations      Interval History:  Patient had a restful night was able to talk with wife at bedside this morning    Oncology Treatment Plan:   [Could not find a treatment plan. This SmartLink may be configured incorrectly. Contact a  for help.]    Medications:  Continuous Infusions:   sodium chloride 0.9% 75 mL/hr at 07/17/22 0421     Scheduled Meds:   apixaban  10 mg Oral BID    atorvastatin  10 mg Oral Daily    cefTRIAXone (ROCEPHIN) IVPB  1 g Intravenous Q24H    docusate sodium  100 mg Oral BID    donepeziL  10 mg Oral Nightly    finasteride  5 mg Oral Daily    folic acid  1 mg Oral Daily    memantine  10 mg Oral BID    metoprolol tartrate  25 mg Oral BID    midodrine  5 mg Oral TID WM    mupirocin   Nasal BID    polyethylene glycol  17 g Oral Daily    sodium chloride 0.9%  10 mL Intravenous Q8H    tamsulosin  0.4 mg Oral Daily     PRN Meds:acetaminophen, acetaminophen, aluminum-magnesium hydroxide-simethicone, dextrose 10%, dextrose 10%, lactulose, melatonin, morphine, naloxone, ondansetron, oxyCODONE, promethazine, simethicone     Review of Systems   Constitutional:  Positive for activity change and fatigue.   Musculoskeletal:  Positive for gait problem and joint swelling.   Neurological:  Positive for weakness.   Psychiatric/Behavioral:  Positive for confusion and dysphoric mood. The patient is nervous/anxious.    Objective:     Vital Signs (Most Recent):  Temp: 98 °F (36.7 °C) (07/17/22 0736)  Pulse: 90 (07/17/22  0736)  Resp: 16 (07/17/22 0736)  BP: 127/69 (07/17/22 0736)  SpO2: 96 % (07/17/22 0736) Vital Signs (24h Range):  Temp:  [97.5 °F (36.4 °C)-98.6 °F (37 °C)] 98 °F (36.7 °C)  Pulse:  [] 90  Resp:  [12-18] 16  SpO2:  [91 %-97 %] 96 %  BP: ()/() 127/69     Weight: 67.4 kg (148 lb 9.4 oz)  Body mass index is 19.6 kg/m².  Body surface area is 1.86 meters squared.      Intake/Output Summary (Last 24 hours) at 7/17/2022 0804  Last data filed at 7/17/2022 0500  Gross per 24 hour   Intake 1558.24 ml   Output 2475 ml   Net -916.76 ml       Physical Exam  Constitutional:       Appearance: He is cachectic. He is ill-appearing.   Neurological:      Motor: Weakness present.      Coordination: Coordination abnormal.      Gait: Gait abnormal.       Significant Labs:   BMP:   Recent Labs   Lab 07/16/22  0433 07/17/22  0443   * 111*   * 132*   K 4.2 4.1   CL 97 97   CO2 24 25   BUN 9 11   CREATININE 0.6 0.7   CALCIUM 8.6* 8.4*   MG 2.0 2.0   , CBC:   Recent Labs   Lab 07/16/22 0433   WBC 9.40   HGB 12.3*   HCT 35.8*      , CMP:   Recent Labs   Lab 07/16/22 0433 07/17/22 0443   * 132*   K 4.2 4.1   CL 97 97   CO2 24 25   * 111*   BUN 9 11   CREATININE 0.6 0.7   CALCIUM 8.6* 8.4*   ANIONGAP 10 10   EGFRNONAA >60 >60   , Coagulation:   Recent Labs   Lab 07/15/22  0826 07/15/22  1544   APTT 33.3* 45.6*   , Haptoglobin: No results for input(s): HAPTOGLOBIN in the last 48 hours., Immunology: No results for input(s): SPEP, ROSA, JAYSHREE, FREELAMBDALI in the last 48 hours., LDH: No results for input(s): LDHCSF, BFSOURCE in the last 48 hours., LFTs: No results for input(s): ALT, AST, ALKPHOS, BILITOT, PROT, ALBUMIN in the last 48 hours., Reticulocytes: No results for input(s): RETIC in the last 48 hours., Tumor Markers: No results for input(s): PSA, CEA, , AFPTM, [id number],  in the last 48 hours.    Invalid input(s): ALGTM, and Uric Acid No results for input(s): URICACID in the last 48  hours.    Diagnostic Results:  I have reviewed all pertinent imaging results/findings within the past 24 hours.    Assessment/Plan:     * Acute pulmonary embolism  Acute unprovoked pulmonary embolus.  Agree with current plan course of action with Eliquis 10 mg p.o. b.i.d. for 7 days followed by 5 mg p.o. b.i.d..; will assess in the outpatient setting in approximately 2-3 months for continuation of treatment.  And any additional workup that would be warranted once family is aware and discussed with them  07/17/2022 discussed situation with wife in patient with severe Alzheimer's.  Would recommend Eliquis.  Will reassess in approximately 1 month determine whether not additional treatment is warranted.  Wife and I discussed the natural history of pulmonary emboli and treatment recommendations.  Discussed above    SVT (supraventricular tachycardia)  Cared for by primary care team    Essential hypertension  Noted in cared for by primary care team    Alzheimer's dementia  Noted in cared for by primary care team        Thank you for your consult. I will follow-up with patient. Please contact us if you have any additional questions.     Ahmet Gill MD  Hematology/Oncology  [facility] ([facility])

## 2022-07-17 NOTE — PLAN OF CARE
Problem: Adult Inpatient Plan of Care  Goal: Plan of Care Review  Outcome: Ongoing, Progressing  Goal: Patient-Specific Goal (Individualized)  Outcome: Ongoing, Progressing  Goal: Absence of Hospital-Acquired Illness or Injury  Outcome: Ongoing, Progressing  Goal: Optimal Comfort and Wellbeing  Outcome: Ongoing, Progressing  Goal: Readiness for Transition of Care  Outcome: Ongoing, Progressing     Problem: Fall Injury Risk  Goal: Absence of Fall and Fall-Related Injury  Outcome: Ongoing, Progressing     Problem: Infection  Goal: Absence of Infection Signs and Symptoms  Outcome: Ongoing, Progressing     Problem: Skin Injury Risk Increased  Goal: Skin Health and Integrity  Outcome: Ongoing, Progressing

## 2022-07-17 NOTE — ASSESSMENT & PLAN NOTE
Acute unprovoked pulmonary embolus.  Agree with current plan course of action with Eliquis 10 mg p.o. b.i.d. for 7 days followed by 5 mg p.o. b.i.d..; will assess in the outpatient setting in approximately 2-3 months for continuation of treatment.  And any additional workup that would be warranted once family is aware and discussed with them  07/17/2022 discussed situation with wife in patient with severe Alzheimer's.  Would recommend Eliquis.  Will reassess in approximately 1 month determine whether not additional treatment is warranted.  Wife and I discussed the natural history of pulmonary emboli and treatment recommendations.  Discussed above

## 2022-07-17 NOTE — PROGRESS NOTES
[facility] ([facility])  [facility] Medicine  Progress Note    Patient Name: Riley Saba  MRN: [id number]  Patient Class: IP- Inpatient   Admission Date: 7/14/2022  Length of Stay: 3 days  Attending Physician: Heath Henderson MD  Primary Care Provider: Bj Clayton MD        Subjective:     Principal Problem:Acute pulmonary embolism        HPI:  83 y/o male with PMHx of HTN, HLD, alzheimers, BPH, SVT, and  who presented to the ED with c/o hypoxia that onset gradually earlier today. Pt was found with O2 sats of 80% by Home Health. Pt does not use home oxygen. Pt was not appearing SOB or using accessory muscles.  Spouse/pt denies: fever, chills, cough, SOB, abd pain, BLE edema, and all other sx at this time.  ED workup shows: WBC 13K, H/H 13/38, , Trop. 0.074, Na 130, Bilirubin 1.3, u/a shows 2+ leukocytes, 10WBC. CTA chest shows: Multifocal bilateral pulmonary emboli as above with findings concerning for right heart strain.  Consider consultation with interventional radiology for thrombolysis.    Moderate bilateral pleural effusions with associated compressive atelectasis.  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  He is a full code and his SDM is his wife  He will be kept on OBS for bilat PE under the care of hospital medicine.        Overview/Hospital Course:  Admitted for further evaluation of hypoxia and increased generalized weakness. Noted to have multifocal gloria pul emboli . Echo was reviewed by IR and suggested no Rt heart strain and IR thrombolysis was not indicated. Venous U/S gloria lower ext showed  DVT within the right superficial femoral vein and bilateral popliteal veins. UA with WBC 10/HPF with indwelling Gramajo catheter. Catheter was exchanged 10 days ago according to wife. Urine culture is requested.     7/15- Pt is awake , oriented to self and person only. H/O dementia. Appears ill and weakened. IR suggested no IR thrombolysis or thrombectomy  indicated. Heparin gtt transitioned to oral Apixaban. Rocephin initiated for UTI.       7/16- Was awake earlier, however somnolent during my visit after PT session. Orthostatic SBP drop noted during PT. Encourage oral fluid intake and Midodrine added. O2 wean down to RA. Disposition - home with  PT/OT. Apixaban continues.     7/17- BP improved with IVF and Midodrine as well as Flomax held this morning . No orthostatic drop noted with standing during PT session. Pt remains extremely weak in general and high risk of fall and injury while on anticoagulation treatment with Apixaban.  Per wife, pt was ambulatory before prostate surgery in 6/2022. Pt will benefit from SNF placement . CM consulted to assist with DC planning.       Interval History:    BP improved with IVF and Midodrine as well as Flomax held this morning . No orthostatic drop noted with standing during PT session.     Review of Systems   Unable to perform ROS: Dementia   Objective:     Vital Signs (Most Recent):  Temp: 98.8 °F (37.1 °C) (07/17/22 1115)  Pulse: 91 (07/17/22 1135)  Resp: 13 (07/17/22 1115)  BP: 137/71 (07/17/22 1115)  SpO2: 98 % (07/17/22 1115) Vital Signs (24h Range):  Temp:  [97.5 °F (36.4 °C)-98.8 °F (37.1 °C)] 98.8 °F (37.1 °C)  Pulse:  [] 91  Resp:  [13-18] 13  SpO2:  [93 %-98 %] 98 %  BP: ()/(45-83) 137/71     Weight: 67.4 kg (148 lb 9.4 oz)  Body mass index is 19.6 kg/m².    Intake/Output Summary (Last 24 hours) at 7/17/2022 1410  Last data filed at 7/17/2022 0900  Gross per 24 hour   Intake 1478.24 ml   Output 2625 ml   Net -1146.76 ml      Physical Exam  Vitals and nursing note reviewed.   Constitutional:       General: He is not in acute distress.     Appearance: He is well-developed. He is not diaphoretic.   HENT:      Head: Normocephalic and atraumatic.      Nose: Nose normal.   Eyes:      General:         Right eye: No discharge.         Left eye: No discharge.      Conjunctiva/sclera: Conjunctivae normal.       Pupils: Pupils are equal, round, and reactive to light.   Neck:      Thyroid: No thyromegaly.      Vascular: No JVD.      Trachea: No tracheal deviation.   Cardiovascular:      Rate and Rhythm: Normal rate and regular rhythm.      Heart sounds: Normal heart sounds. No murmur heard.    No friction rub. No gallop.   Pulmonary:      Effort: Pulmonary effort is normal. No respiratory distress.      Breath sounds: Normal breath sounds. No wheezing or rales.   Chest:      Chest wall: No tenderness.   Abdominal:      General: Bowel sounds are normal. There is no distension.      Palpations: Abdomen is soft. There is no mass.      Tenderness: There is no abdominal tenderness.   Genitourinary:     Comments: Gramajo cath draining clear, yellow urine  Musculoskeletal:         General: No tenderness or deformity. Normal range of motion.      Cervical back: Normal range of motion and neck supple.   Skin:     General: Skin is warm and dry.      Capillary Refill: Capillary refill takes less than 2 seconds.      Findings: No erythema or rash.   Neurological:      Mental Status: He is alert.      Motor: Weakness present. No abnormal muscle tone.      Coordination: Coordination normal.      Comments: Oriented to self and person only    Psychiatric:         Behavior: Behavior normal.       Significant Labs: All pertinent labs within the past 24 hours have been reviewed.  BMP:   Recent Labs   Lab 07/17/22  0443   *   *   K 4.1   CL 97   CO2 25   BUN 11   CREATININE 0.7   CALCIUM 8.4*   MG 2.0       Significant Imaging:       Assessment/Plan:      * Acute pulmonary embolism  --CTA chest shows Multifocal bilateral pulmonary emboli as above with findings concerning for right heart strain.    --heparin gtt  --IR consulted for possible thrombolysis  --- Likely provoked in the setting of recent surgery, multiple hospital admission and being sedentary   7/15-  Echo was reviewed by IR and suggested no Rt heart strain and IR  thrombolysis was not indicated.  Heparin gtt transitioned to oral Apixaban   7/17-  Continue Apixaban 10 mg one po bid x 1 week, then 5 mg bid for 3 to 6 mos or longer per Hem/Onc discretion   Seems it is a provoked VTE given recent surgery , multiple hospitalization and being bed bound since prostate surgery   Hem/Onc consulted       Acute deep vein thrombosis (DVT) of both lower extremities  - Likely provoked in the setting of recent surgery, multiple hospital admission and being sedentary   -Heparin gtt transitioned to oral Apixaban       SVT (supraventricular tachycardia)  --Continue BB  -Dose adjusted to control HR under 100      Essential hypertension  -Monitor BP trend as wife reports orthostatic drop of systolic BP at home   -Orthostatic vitals   -Continue BB  7/16-  -Orthostatic SBP drop noted   -Continue BB for rate control   -Midodrine added to support BP  -Encourage oral fluid intake   -Flomax could certainly play a role for orthostatic hypotension     Elevated troponin  --likely 2/2 PE  --pt denies CP  --2d echo pending  --trend troponin      BPH (benign prostatic hyperplasia)  --Has indwelling mendes at home since prostate surgery in 6/6/22  --Continue flomax and proscar  --F/U  OP with urology as scheduled      Alzheimer's dementia  --continue namenda, aricept  --supportive care      UTI (urinary tract infection)- Ruled out   - Pt with indwelling mendes catheter   -Catheter was exchanged 10 days ago according to wife   -UA showed WBC 10/HPF  -Urine culture requested   -Rocephin initiated   7/17-  Urine culture - no growth     Orthostatic hypotension  - Flomax could contribute to orthostatic hypotension  -Midodrine added   -IVF trial and encourage oral fluid intake   -Currently no active infectious process   -Monitor BP trend        Leukocytosis  - Reactive vs infectious etiology   -Blood cultures x 2  -UA is suggestive of UTI. Urine culture requested   -Rocephin initiated to cover UTI  7/16-  Resolved          VTE Risk Mitigation (From admission, onward)         Ordered     apixaban tablet 10 mg  2 times daily         07/15/22 1032     IP VTE HIGH RISK PATIENT  Once         07/14/22 2056     Place sequential compression device  Until discontinued         07/14/22 2056     Reason for No Pharmacological VTE Prophylaxis  Once        Question:  Reasons:  Answer:  IV Heparin w/in 24 hrs. Pre or Post-Op    07/14/22 2056                Discharge Planning   ELIZABETH:      Code Status: Full Code   Is the patient medically ready for discharge?:     Reason for patient still in hospital (select all that apply): Patient trending condition  Discharge Plan A: Home Health                  Heath Henderson MD  Department of Hospital Medicine   [facility])

## 2022-07-17 NOTE — PLAN OF CARE
Patient and wife updated on plan of care. Instructed patient and wife to use call light for assistance, call light in reach.Hourly rounding performed, bed locked, side rails up, and in low position. Turns q2 hours. Patient had large BM today. NSR-ST on monitor. Education provided, questions answered as of now. Patient remained free from falls during shift. Will continue to monitor per unit practice/policy. Jelena Patton RN.

## 2022-07-17 NOTE — PLAN OF CARE
Pt tolerated tx well today. No orthostatic hypotension noted today with transitional movements. He stood multiple times with the RW and took a few steps. Continues to be weak. His wife reports that she did NOT have him take his flomax this morning.

## 2022-07-17 NOTE — ASSESSMENT & PLAN NOTE
- Pt with indwelling mendes catheter   -Catheter was exchanged 10 days ago according to wife   -UA showed WBC 10/HPF  -Urine culture requested   -Rocephin initiated   7/17-  Urine culture - no growth

## 2022-07-17 NOTE — ASSESSMENT & PLAN NOTE
- Flomax could contribute to orthostatic hypotension  -Midodrine added   -IVF trial and encourage oral fluid intake   -Currently no active infectious process   -Monitor BP trend

## 2022-07-17 NOTE — PT/OT/SLP PROGRESS
Physical Therapy  Treatment    Riley Saba   MRN: [id number]   Admitting Diagnosis: Acute pulmonary embolism    PT Received On: 07/17/22  PT Start Time: 0900     PT Stop Time: 0943    PT Total Time (min): 43 min       Billable Minutes:  Therapeutic Activity 43    Treatment Type: Treatment  PT/PTA: PTA     PTA Visit Number: 1       General Precautions: Standard, fall  Orthopedic Precautions: N/A   Braces: N/A  Respiratory Status: Room air         Subjective:  Communicated with epic and nurse Ann Marie prior to session.  Pt was minimally verbal but agreed to tx. Wife present.     Pain/Comfort  Pain Rating 1: 0/10    Objective:   Patient found with: telemetry, peripheral IV, bed alarm, mendes catheter    Functional Mobility:  Bed Mobility:     MIN A with increased time and cues.     Transfers:    Sit to stand: min a with increased time and cues.     Gait:     Not safe to ambulate        Therapeutic Activities and Exercises:  Spoke to pt's nurse: he had received his Midodrine ~90 min prior to tx. Wife states that she held his flomax after determining that it can cause orthostatic hypotension.     ORTHOSTATIC BLOOD PRESSURE TAKEN:    Supine; 137/73 Hr 89  Seated :125/92 HR 88  Seated for 3 min: 133/79 HR 89  Seated for 6 Min: 128/73 HR 88    Unable to obtain a pressure in standing due to pt moving around too much. He is wife took it once he sat, using her BP machine from home. His BP was 125/ ??. Stood 3 times with the RW and maintained standing with MIN A. Occasional posterior lean with MOD A to correct. He took side steps along the eob. Assist to manage the RW. No oob due to concerns for ongoing orthostatic hypotension. He did maintain his BP today. He was also getting IV fluids during the session. He has some difficulty follow commands and needs things repeated with tactile cues.     AM-PAC 6 CLICK MOBILITY  How much help from another person does this patient currently need?   1 = Unable, Total/Dependent Assistance  2 =  A lot, Maximum/Moderate Assistance  3 = A little, Minimum/Contact Guard/Supervision  4 = None, Modified [place]/Independent    Turning over in bed (including adjusting bedclothes, sheets and blankets)?: 3  Sitting down on and standing up from a chair with arms (e.g., wheelchair, bedside commode, etc.): 3  Moving from lying on back to sitting on the side of the bed?: 3  Moving to and from a bed to a chair (including a wheelchair)?: 3  Need to walk in hospital room?: 1  Climbing 3-5 steps with a railing?: 1  Basic Mobility Total Score: 14    AM-PAC Raw Score CMS G-Code Modifier Level of Impairment Assistance   6 % Total / Unable   7 - 9 CM 80 - 100% Maximal Assist   10 - 14 CL 60 - 80% Moderate Assist   15 - 19 CK 40 - 60% Moderate Assist   20 - 22 CJ 20 - 40% Minimal Assist   23 CI 1-20% SBA / CGA   24 CH 0% Independent/ Mod I     Patient left HOB elevated with all lines intact, call button in reach and wife present.    Assessment:  Riley Saba is a 82 y.o. male with a medical diagnosis of Acute pulmonary embolism and presents with slightly improved BP today for various reasons. .    Rehab identified problem list/impairments: Rehab identified problem list/impairments: weakness, gait instability, decreased upper extremity function, decreased ROM, impaired balance, impaired functional mobility, impaired cognition, impaired self care skills, impaired endurance    Rehab potential is fair.    Activity tolerance: Fair    Discharge recommendations: Discharge Facility/Level of Care Needs: home health PT     Barriers to discharge:      Equipment recommendations: Equipment Needed After Discharge: none     GOALS:   Multidisciplinary Problems     Physical Therapy Goals        Problem: Physical Therapy    Goal Priority Disciplines Outcome Goal Variances Interventions   Physical Therapy Goal     PT, PT/OT Ongoing, Progressing     Description: LTG'S TO BE MET IN 14 DAYS (7-30-22)  PT WILL REQUIRE [facility] FOR BED  MOBILITY  PT WILL REQUIRE [facility] FOR BED<>CHAIR TF'S  PT WILL AMB 50 FEET WITH RW AND CGA                     PLAN:    Patient to be seen 3 x/week  to address the above listed problems via therapeutic activities, therapeutic exercises, gait training  Plan of Care expires: 07/30/22  Plan of Care reviewed with: patient, spouse         07/17/2022

## 2022-07-18 VITALS
OXYGEN SATURATION: 94 % | TEMPERATURE: 98 F | SYSTOLIC BLOOD PRESSURE: 137 MMHG | WEIGHT: 154.31 LBS | BODY MASS INDEX: 20.45 KG/M2 | HEART RATE: 96 BPM | DIASTOLIC BLOOD PRESSURE: 79 MMHG | HEIGHT: 73 IN | RESPIRATION RATE: 18 BRPM

## 2022-07-18 LAB
BASOPHILS # BLD AUTO: 0.09 K/UL (ref 0–0.2)
BASOPHILS NFR BLD: 1.1 % (ref 0–1.9)
DIFFERENTIAL METHOD: ABNORMAL
EOSINOPHIL # BLD AUTO: 0.7 K/UL (ref 0–0.5)
EOSINOPHIL NFR BLD: 8.9 % (ref 0–8)
ERYTHROCYTE [DISTWIDTH] IN BLOOD BY AUTOMATED COUNT: 13.8 % (ref 11.5–14.5)
HCT VFR BLD AUTO: 39.1 % (ref 40–54)
HGB BLD-MCNC: 12.5 G/DL (ref 14–18)
IMM GRANULOCYTES # BLD AUTO: 0.05 K/UL (ref 0–0.04)
IMM GRANULOCYTES NFR BLD AUTO: 0.6 % (ref 0–0.5)
LYMPHOCYTES # BLD AUTO: 1 K/UL (ref 1–4.8)
LYMPHOCYTES NFR BLD: 12.9 % (ref 18–48)
MCH RBC QN AUTO: 29.3 PG (ref 27–31)
MCHC RBC AUTO-ENTMCNC: 32 G/DL (ref 32–36)
MCV RBC AUTO: 92 FL (ref 82–98)
MONOCYTES # BLD AUTO: 0.9 K/UL (ref 0.3–1)
MONOCYTES NFR BLD: 12 % (ref 4–15)
NEUTROPHILS # BLD AUTO: 5.1 K/UL (ref 1.8–7.7)
NEUTROPHILS NFR BLD: 64.5 % (ref 38–73)
NRBC BLD-RTO: 0 /100 WBC
PLATELET # BLD AUTO: 261 K/UL (ref 150–450)
PMV BLD AUTO: 10.7 FL (ref 9.2–12.9)
RBC # BLD AUTO: 4.27 M/UL (ref 4.6–6.2)
WBC # BLD AUTO: 7.84 K/UL (ref 3.9–12.7)

## 2022-07-18 PROCEDURE — 25000003 PHARM REV CODE 250: Performed by: INTERNAL MEDICINE

## 2022-07-18 PROCEDURE — 85025 COMPLETE CBC W/AUTO DIFF WBC: CPT | Performed by: INTERNAL MEDICINE

## 2022-07-18 PROCEDURE — 25000003 PHARM REV CODE 250: Performed by: NURSE PRACTITIONER

## 2022-07-18 PROCEDURE — 99231 PR SUBSEQUENT HOSPITAL CARE,LEVL I: ICD-10-PCS | Mod: ,,, | Performed by: INTERNAL MEDICINE

## 2022-07-18 PROCEDURE — 97530 THERAPEUTIC ACTIVITIES: CPT | Mod: CQ

## 2022-07-18 PROCEDURE — 36415 COLL VENOUS BLD VENIPUNCTURE: CPT | Performed by: INTERNAL MEDICINE

## 2022-07-18 PROCEDURE — 99231 SBSQ HOSP IP/OBS SF/LOW 25: CPT | Mod: ,,, | Performed by: INTERNAL MEDICINE

## 2022-07-18 RX ORDER — LACTULOSE 10 G/15ML
20 SOLUTION ORAL; RECTAL DAILY
Qty: 473 ML | Refills: 0 | Status: ON HOLD | OUTPATIENT
Start: 2022-07-18 | End: 2022-08-03

## 2022-07-18 RX ORDER — MIDODRINE HYDROCHLORIDE 5 MG/1
5 TABLET ORAL
Qty: 90 TABLET | Refills: 0 | Status: SHIPPED | OUTPATIENT
Start: 2022-07-18 | End: 2022-08-17

## 2022-07-18 RX ADMIN — FINASTERIDE 5 MG: 5 TABLET, FILM COATED ORAL at 09:07

## 2022-07-18 RX ADMIN — MIDODRINE HYDROCHLORIDE 5 MG: 5 TABLET ORAL at 12:07

## 2022-07-18 RX ADMIN — FOLIC ACID 1 MG: 1 TABLET ORAL at 09:07

## 2022-07-18 RX ADMIN — MUPIROCIN: 20 OINTMENT TOPICAL at 09:07

## 2022-07-18 RX ADMIN — METOPROLOL TARTRATE 25 MG: 25 TABLET, FILM COATED ORAL at 09:07

## 2022-07-18 RX ADMIN — APIXABAN 10 MG: 2.5 TABLET, FILM COATED ORAL at 09:07

## 2022-07-18 RX ADMIN — DOCUSATE SODIUM 100 MG: 100 CAPSULE, LIQUID FILLED ORAL at 09:07

## 2022-07-18 RX ADMIN — ATORVASTATIN CALCIUM 10 MG: 10 TABLET, FILM COATED ORAL at 09:07

## 2022-07-18 RX ADMIN — POLYETHYLENE GLYCOL 3350 17 G: 17 POWDER, FOR SOLUTION ORAL at 09:07

## 2022-07-18 RX ADMIN — MIDODRINE HYDROCHLORIDE 5 MG: 5 TABLET ORAL at 09:07

## 2022-07-18 NOTE — SUBJECTIVE & OBJECTIVE
Interval History:  Wife at bedside this morning discussed situation with her    Oncology Treatment Plan:   [Could not find a treatment plan. This SmartLink may be configured incorrectly. Contact a  for help.]    Medications:  Continuous Infusions:  Scheduled Meds:   apixaban  10 mg Oral BID    atorvastatin  10 mg Oral Daily    docusate sodium  100 mg Oral BID    donepeziL  10 mg Oral Nightly    finasteride  5 mg Oral Daily    folic acid  1 mg Oral Daily    memantine  10 mg Oral BID    metoprolol tartrate  25 mg Oral BID    midodrine  5 mg Oral TID WM    mupirocin   Nasal BID    polyethylene glycol  17 g Oral Daily    sodium chloride 0.9%  10 mL Intravenous Q8H    tamsulosin  0.4 mg Oral QHS     PRN Meds:acetaminophen, acetaminophen, aluminum-magnesium hydroxide-simethicone, dextrose 10%, dextrose 10%, lactulose, melatonin, morphine, naloxone, ondansetron, oxyCODONE, promethazine, sars-cov-2 (covid-19), simethicone     Review of Systems   Constitutional:  Positive for fatigue.   Neurological:  Positive for weakness.   Psychiatric/Behavioral:  Positive for confusion, decreased concentration and dysphoric mood.    Objective:     Vital Signs (Most Recent):  Temp: 97.9 °F (36.6 °C) (07/18/22 0332)  Pulse: 92 (07/18/22 0332)  Resp: 16 (07/18/22 0332)  BP: 138/71 (07/18/22 0332)  SpO2: (!) 92 % (07/18/22 0332)   Vital Signs (24h Range):  Temp:  [97.9 °F (36.6 °C)-98.8 °F (37.1 °C)] 97.9 °F (36.6 °C)  Pulse:  [] 92  Resp:  [13-17] 16  SpO2:  [91 %-98 %] 92 %  BP: (127-160)/(69-82) 138/71     Weight: 70 kg (154 lb 5.2 oz)  Body mass index is 20.36 kg/m².  Body surface area is 1.9 meters squared.      Intake/Output Summary (Last 24 hours) at 7/18/2022 0716  Last data filed at 7/18/2022 0332  Gross per 24 hour   Intake 640 ml   Output 3550 ml   Net -2910 ml       Physical Exam  Constitutional:       Appearance: He is ill-appearing.   Psychiatric:         Cognition and Memory: Cognition is impaired.        Significant Labs:   BMP:   Recent Labs   Lab 07/17/22  0443   *   *   K 4.1   CL 97   CO2 25   BUN 11   CREATININE 0.7   CALCIUM 8.4*   MG 2.0   , CBC:   Recent Labs   Lab 07/18/22 0449   WBC 7.84   HGB 12.5*   HCT 39.1*      , CMP:   Recent Labs   Lab 07/17/22  0443   *   K 4.1   CL 97   CO2 25   *   BUN 11   CREATININE 0.7   CALCIUM 8.4*   ANIONGAP 10   EGFRNONAA >60   , Coagulation: No results for input(s): PT, INR, APTT in the last 48 hours., Haptoglobin: No results for input(s): HAPTOGLOBIN in the last 48 hours., Immunology: No results for input(s): SPEP, ROSA, JAYSHREE, FREELAMBDALI in the last 48 hours., LDH: No results for input(s): LDHCSF, BFSOURCE in the last 48 hours., and LFTs: No results for input(s): ALT, AST, ALKPHOS, BILITOT, PROT, ALBUMIN in the last 48 hours.    Diagnostic Results:  I have reviewed all pertinent imaging results/findings within the past 24 hours.

## 2022-07-18 NOTE — PLAN OF CARE
Wife did not want to give any other preferences as they would be too far from her home.  She requested CM check with [facility] to see if she could pay fee for service.  Spoke with Jaye at [facility].  She could not give me what a fee for service charge would be and stated if he needs 5 days a week, he is not home health appropriate.    Dr. Henderson is concerned about patient going home with home health on anticoagulants and falling.  Spoke with wife.  She is in agreement to send a referral to [facility].  [facility] states they have a bed and will review records.       07/18/22 1034   Post-Acute Status   Post-Acute Authorization Placement   Post-Acute Placement Status Referrals Sent   Discharge Plan   Discharge Plan A Skilled Nursing Facility     3:33pm Patient accepted by [facility].  Discharge paperwork sent via [employer].  Please call report to [phone].  Will need ambulance transport set up.  Secure chat sent to nurse.

## 2022-07-18 NOTE — PLAN OF CARE
Left message for Antoinette, liaison with [facility], regarding acceptance.       07/18/22 0910   Post-Acute Status   Post-Acute Authorization Placement   Post-Acute Placement Status Pending post-acute provider review/more information requested   Discharge Plan   Discharge Plan A Skilled Nursing Facility     Per Antoinette, no beds at [facility] until the end of the week.

## 2022-07-18 NOTE — PROGRESS NOTES
[facility] - Telemetry ([facility])  Hematology/Oncology  Progress Note    Patient Name: Riley Saba  Admission Date: 7/14/2022  Hospital Length of Stay: 4 days  Code Status: Full Code     Subjective:     HPI:  82-year-old male admitted to the hospital with unprovoked DVT as well as pulmonary embolus.  Patient is currently on Eliquis 10 mg p.o. b.i.d. for 7 days followed by 5 mg p.o. b.i.d. the asked to see the patient for further evaluation and treatment recommendations      Interval History:  Wife at bedside this morning discussed situation with her    Oncology Treatment Plan:   [Could not find a treatment plan. This SmartLink may be configured incorrectly. Contact a  for help.]    Medications:  Continuous Infusions:  Scheduled Meds:   apixaban  10 mg Oral BID    atorvastatin  10 mg Oral Daily    docusate sodium  100 mg Oral BID    donepeziL  10 mg Oral Nightly    finasteride  5 mg Oral Daily    folic acid  1 mg Oral Daily    memantine  10 mg Oral BID    metoprolol tartrate  25 mg Oral BID    midodrine  5 mg Oral TID WM    mupirocin   Nasal BID    polyethylene glycol  17 g Oral Daily    sodium chloride 0.9%  10 mL Intravenous Q8H    tamsulosin  0.4 mg Oral QHS     PRN Meds:acetaminophen, acetaminophen, aluminum-magnesium hydroxide-simethicone, dextrose 10%, dextrose 10%, lactulose, melatonin, morphine, naloxone, ondansetron, oxyCODONE, promethazine, sars-cov-2 (covid-19), simethicone     Review of Systems   Constitutional:  Positive for fatigue.   Neurological:  Positive for weakness.   Psychiatric/Behavioral:  Positive for confusion, decreased concentration and dysphoric mood.    Objective:     Vital Signs (Most Recent):  Temp: 97.9 °F (36.6 °C) (07/18/22 0332)  Pulse: 92 (07/18/22 0332)  Resp: 16 (07/18/22 0332)  BP: 138/71 (07/18/22 0332)  SpO2: (!) 92 % (07/18/22 0332)   Vital Signs (24h Range):  Temp:  [97.9 °F (36.6 °C)-98.8 °F (37.1 °C)] 97.9 °F (36.6 °C)  Pulse:  []  92  Resp:  [13-17] 16  SpO2:  [91 %-98 %] 92 %  BP: (127-160)/(69-82) 138/71     Weight: 70 kg (154 lb 5.2 oz)  Body mass index is 20.36 kg/m².  Body surface area is 1.9 meters squared.      Intake/Output Summary (Last 24 hours) at 7/18/2022 0716  Last data filed at 7/18/2022 0332  Gross per 24 hour   Intake 640 ml   Output 3550 ml   Net -2910 ml       Physical Exam  Constitutional:       Appearance: He is ill-appearing.   Psychiatric:         Cognition and Memory: Cognition is impaired.       Significant Labs:   BMP:   Recent Labs   Lab 07/17/22 0443   *   *   K 4.1   CL 97   CO2 25   BUN 11   CREATININE 0.7   CALCIUM 8.4*   MG 2.0   , CBC:   Recent Labs   Lab 07/18/22 0449   WBC 7.84   HGB 12.5*   HCT 39.1*      , CMP:   Recent Labs   Lab 07/17/22 0443   *   K 4.1   CL 97   CO2 25   *   BUN 11   CREATININE 0.7   CALCIUM 8.4*   ANIONGAP 10   EGFRNONAA >60   , Coagulation: No results for input(s): PT, INR, APTT in the last 48 hours., Haptoglobin: No results for input(s): HAPTOGLOBIN in the last 48 hours., Immunology: No results for input(s): SPEP, ROSA, JAYSHREE, FREELAMBDALI in the last 48 hours., LDH: No results for input(s): LDHCSF, BFSOURCE in the last 48 hours., and LFTs: No results for input(s): ALT, AST, ALKPHOS, BILITOT, PROT, ALBUMIN in the last 48 hours.    Diagnostic Results:  I have reviewed all pertinent imaging results/findings within the past 24 hours.    Assessment/Plan:     * Acute pulmonary embolism  Acute unprovoked pulmonary embolus.  Agree with current plan course of action with Eliquis 10 mg p.o. b.i.d. for 7 days followed by 5 mg p.o. b.i.d..; will assess in the outpatient setting in approximately 2-3 months for continuation of treatment.  And any additional workup that would be warranted once family is aware and discussed with them  07/17/2022 discussed situation with wife in patient with severe Alzheimer's.  Would recommend Eliquis.  Will reassess in  approximately 1 month determine whether not additional treatment is warranted.  Wife and I discussed the natural history of pulmonary emboli and treatment recommendations.  Discussed above  07/18/2022 wife appreciative of me stopping by this morning to discuss situation in terms of pulmonary embolus discussed other issues in terms of his overall care with her.    SVT (supraventricular tachycardia)  Cared for by primary care team    Essential hypertension  Noted in cared for by primary care team    Alzheimer's dementia  Noted in cared for by primary care team        Thank you for your consult. I will follow-up with patient. Please contact us if you have any additional questions.     Ahmet Gill MD  Hematology/Oncology  [facility] - Telemetry ([facility])

## 2022-07-18 NOTE — PROGRESS NOTES
Pt removed from tele monitor   PIV removed for myron Gramajo to remain with patient   Report called to the [facility] arranged for EMS transport

## 2022-07-18 NOTE — ASSESSMENT & PLAN NOTE
Acute unprovoked pulmonary embolus.  Agree with current plan course of action with Eliquis 10 mg p.o. b.i.d. for 7 days followed by 5 mg p.o. b.i.d..; will assess in the outpatient setting in approximately 2-3 months for continuation of treatment.  And any additional workup that would be warranted once family is aware and discussed with them  07/17/2022 discussed situation with wife in patient with severe Alzheimer's.  Would recommend Eliquis.  Will reassess in approximately 1 month determine whether not additional treatment is warranted.  Wife and I discussed the natural history of pulmonary emboli and treatment recommendations.  Discussed above  07/18/2022 wife appreciative of me stopping by this morning to discuss situation in terms of pulmonary embolus discussed other issues in terms of his overall care with her.

## 2022-07-18 NOTE — PT/OT/SLP PROGRESS
Physical Therapy  Treatment    Riley Saba   MRN: [id number]   Admitting Diagnosis: Acute pulmonary embolism    PT Received On: 07/18/22  PT Start Time: 0825     PT Stop Time: 0855    PT Total Time (min): 30 min       Billable Minutes:  Therapeutic Activity 30    Treatment Type: Treatment  PT/PTA: PTA     PTA Visit Number: 2       General Precautions: Standard, aspiration, fall, pureed diet  Orthopedic Precautions: N/A   Braces: N/A  Respiratory Status: Room air         Subjective:  Communicated with patient's nurse, Kassie, and completed Epic chart review prior to session.  Patient agreed to PT session. Noted difficulty with verbal interaction throughout session.    Pain/Comfort  Pain Rating 1: 0/10    Objective:   Patient found with: telemetry, peripheral IV, bed alarm, mendes catheter, pressure relief boots    Functional Mobility:  Supine > sit EOB: Min A (Consistent VC for sequencing of task; noted decreased ability to follow commands; about half way through supin > sit, patient began to return to supine without having been instructed to do so)    Forward scoot toward EOB: Mod A     x4 attempts STS from EOB > RW but only 2/4 were successful w/ Max A   Severe posterior lean and poor coordination  VC for hand placement but patient with significant difficulty coordinating hand placement despite hand over hand assistance  During successful stands patient was able to remain standing ~ 1 min each w/ Mod A to maintain standing balance.  x20 reps MIP w/ BUE support on RW Mod A to maintain balance    Due to significant difficulty following commands and maintaining standing, T/F to chair was not attempted today.     Seated lateral scoot towards HOB: Max A (multiple reps)    Sit > supine: Min A     Educated patient & wife on importance of increased tolerance to upright position in order to promote CV endurance. Encouraged patient to utilize elevated HOB to achieve simulated chair position until he is able to safely  complete T/F. Encouraged patient to perform AROM TE to BLE throughout the day in order to prevent further loss of ROM and strength. Re enforced importance of utilizing carmela light to meet needs in room. Both verbalized understanding of all education and agreed to comply.    AM-PAC 6 CLICK MOBILITY  How much help from another person does this patient currently need?   1 = Unable, Total/Dependent Assistance  2 = A lot, Maximum/Moderate Assistance  3 = A little, Minimum/Contact Guard/Supervision  4 = None, Modified [place]/Independent    Turning over in bed (including adjusting bedclothes, sheets and blankets)?: 3  Sitting down on and standing up from a chair with arms (e.g., wheelchair, bedside commode, etc.): 2  Moving from lying on back to sitting on the side of the bed?: 3  Moving to and from a bed to a chair (including a wheelchair)?: 1  Need to walk in hospital room?: 1  Climbing 3-5 steps with a railing?: 1  Basic Mobility Total Score: 11    AM-PAC Raw Score CMS G-Code Modifier Level of Impairment Assistance   6 % Total / Unable   7 - 9 CM 80 - 100% Maximal Assist   10 - 14 CL 60 - 80% Moderate Assist   15 - 19 CK 40 - 60% Moderate Assist   20 - 22 CJ 20 - 40% Minimal Assist   23 CI 1-20% SBA / CGA   24 CH 0% Independent/ Mod I     Patient left with bed in chair position with call button in reach, bed alarm on and wife present.    Assessment:  Riley Saba is a 82 y.o. male with a medical diagnosis of Acute pulmonary embolism and presents with overall decline in functional mobility. Patient would continue to benefit from skilled PT to address functional limitations listed below in order to return to PLOF/decrease caregiver burden.     Rehab identified problem list/impairments: Rehab identified problem list/impairments: weakness, impaired endurance, impaired sensation, impaired self care skills, impaired functional mobility, gait instability, impaired balance, impaired cognition, decreased  coordination, decreased lower extremity function, decreased safety awareness, impaired coordination, impaired cardiopulmonary response to activity    Rehab potential is fair.    Activity tolerance: Poor    Discharge recommendations: Discharge Facility/Level of Care Needs: nursing facility, skilled     Barriers to discharge:      Equipment recommendations: Equipment Needed After Discharge: none     GOALS:   Multidisciplinary Problems     Physical Therapy Goals        Problem: Physical Therapy    Goal Priority Disciplines Outcome Goal Variances Interventions   Physical Therapy Goal     PT, PT/OT Ongoing, Progressing     Description: LTG'S TO BE MET IN 14 DAYS (7-30-22)  PT WILL REQUIRE [facility] FOR BED MOBILITY  PT WILL REQUIRE [facility] FOR BED<>CHAIR TF'S  PT WILL AMB 50 FEET WITH RW AND CGA                     PLAN:    Patient to be seen 3 x/week  to address the above listed problems via gait training, therapeutic activities, therapeutic exercises  Plan of Care expires: 07/30/22  Plan of Care reviewed with: patient, spouse         07/18/2022

## 2022-07-18 NOTE — PLAN OF CARE
[facility] - Telemetry (Hospital)  Discharge Final Note    Primary Care Provider: Bj Clayton MD    Expected Discharge Date: 7/18/2022    Final Discharge Note (most recent)     Final Note - 07/18/22 1623        Final Note    Assessment Type Final Discharge Note     Anticipated Discharge Disposition Skilled Nursing Facility        Post-Acute Status    Post-Acute Authorization Placement     Post-Acute Placement Status Set-up Complete/Auth obtained                 Important Message from Medicare             Contact Info     Bj Clayton MD   Specialty: Internal Medicine   Relationship: PCP - General    [facility]  [address] [place] [id number]   Phone: [phone]       Next Steps: Schedule an appointment as soon as possible for a visit in 3 day(s)    Instructions: Discharge follow up    Ahmet Gill MD   Specialty: Hematology and Oncology    [address]  [place] [address]   Phone: [phone]       Next Steps: Schedule an appointment as soon as possible for a visit in 3 week(s)    Instructions: Hematology follow up for blood clots

## 2022-07-19 LAB
BACTERIA BLD CULT: NORMAL
BACTERIA BLD CULT: NORMAL

## 2022-07-20 ENCOUNTER — TELEPHONE (OUTPATIENT)
Dept: SURGERY | Facility: CLINIC | Age: 82
End: 2022-07-20
Payer: MEDICARE

## 2022-07-20 DIAGNOSIS — I26.99 ACUTE PULMONARY EMBOLISM, UNSPECIFIED PULMONARY EMBOLISM TYPE, UNSPECIFIED WHETHER ACUTE COR PULMONALE PRESENT: Primary | ICD-10-CM

## 2022-07-20 NOTE — TELEPHONE ENCOUNTER
To date there has been no response to my message - Patient is also still admitted       ----- Message from Dinora Bryan LPN sent at 7/15/2022 10:14 AM CDT -----  Regarding: FW: Post-op fiollow up    What does this patient need to be seen by Dr Duffy for? Spoke to Hansa Evans PA-C, just need clarification - He has not had surgery and Dr Duffy has not consulted on him. Looks like patient is admitted for Pulmonary issues not Colon and Rectal. Please advise.       ----- Message -----  From: María Elena Ferguson MA  Sent: 7/15/2022  10:03 AM CDT  To: Dinora Bryan LPN  Subject: FW: Post-op fiollow up                             ----- Message -----  From: Marisa Dickinson RN  Sent: 7/15/2022   9:40 AM CDT  To: On General Surgery Clinical Support  Subject: Post-op fiollow up                               This patient needs a post op follow up with Dr. Duffy.  Thanks, Marisa

## 2022-07-22 ENCOUNTER — PES CALL (OUTPATIENT)
Dept: ADMINISTRATIVE | Facility: CLINIC | Age: 82
End: 2022-07-22
Payer: MEDICARE

## 2022-07-25 NOTE — DISCHARGE SUMMARY
[facility] - Telemetry ([facility])  [facility] Medicine  Discharge Summary      Patient Name: Riley Saba  MRN: [id number]  Patient Class: IP- Inpatient  Admission Date: 7/14/2022  Hospital Length of Stay: 4 days  Discharge Date and Time: 7/18/2022  7:56 PM  Attending Physician: No att. providers found   Discharging Provider: Heath Henderson MD  Primary Care Provider: Bj Clayton MD      HPI:   81 y/o male with PMHx of HTN, HLD, alzheimers, BPH, SVT, and  who presented to the ED with c/o hypoxia that onset gradually earlier today. Pt was found with O2 sats of 80% by Home Health. Pt does not use home oxygen. Pt was not appearing SOB or using accessory muscles.  Spouse/pt denies: fever, chills, cough, SOB, abd pain, BLE edema, and all other sx at this time.  ED workup shows: WBC 13K, H/H 13/38, , Trop. 0.074, Na 130, Bilirubin 1.3, u/a shows 2+ leukocytes, 10WBC. CTA chest shows: Multifocal bilateral pulmonary emboli as above with findings concerning for right heart strain.  Consider consultation with interventional radiology for thrombolysis.    Moderate bilateral pleural effusions with associated compressive atelectasis.  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  He is a full code and his SDM is his wife  He will be kept on OBS for bilat PE under the care of [facility] medicine.        * No surgery found *      Hospital Course:   Admitted for further evaluation of hypoxia and increased generalized weakness. Noted to have multifocal gloria pul emboli . Echo was reviewed by IR and suggested no Rt heart strain and IR thrombolysis was not indicated. Venous U/S gloria lower ext showed  DVT within the right superficial femoral vein and bilateral popliteal veins. UA with WBC 10/HPF with indwelling Gramajo catheter. Catheter was exchanged 10 days ago according to wife. Urine culture is requested.     7/15- Pt is awake , oriented to self and person only. H/O dementia. Appears ill and  weakened. IR suggested no IR thrombolysis or thrombectomy indicated. Heparin gtt transitioned to oral Apixaban. Rocephin initiated for UTI.       7/16- Was awake earlier, however somnolent during my visit after PT session. Orthostatic SBP drop noted during PT. Encourage oral fluid intake and Midodrine added. O2 wean down to RA. Disposition - home with HH PT/OT. Apixaban continues.     7/17- BP improved with IVF and Midodrine as well as Flomax held this morning . No orthostatic drop noted with standing during PT session. Pt remains extremely weak in general and high risk of fall and injury while on anticoagulation treatment with Apixaban.  Per wife, pt was ambulatory before prostate surgery in 6/2022. Pt will benefit from SNF placement . CM consulted to assist with DC planning.     7/18- Wife initially wanted to take pt home, but later opted SNF placement . Pt accepted to [facility]. Pt is examined and deemed stable for transfer to SNF. Pt will follow up with PCP, Hematology and Urology after discharge .        Goals of Care Treatment Preferences:  Code Status: Full Code      Consults:   Consults (From admission, onward)        Status Ordering Provider     Inpatient consult to Social Work  Once        Provider:  (Not yet assigned)    Completed STEPHEN SHEARER     Inpatient consult to Registered Dietitian/Nutritionist  Once        Provider:  (Not yet assigned)    Completed STEVAN AVERY     Inpatient consult to Interventional Radiology  Once        Provider:  jB Edmonds MD    Completed SAM LOMBARDO          No new Assessment & Plan notes have been filed under this hospital service since the last note was generated.  Service: Hospital Medicine    Final Active Diagnoses:    Diagnosis Date Noted POA    PRINCIPAL PROBLEM:  Acute pulmonary embolism [I26.99] 07/14/2022 Yes    Acute deep vein thrombosis (DVT) of both lower extremities [I82.403] 07/15/2022 Yes    SVT (supraventricular tachycardia)  [I47.1] 07/14/2022 Yes    Essential hypertension [I10] 07/14/2022 Yes    Elevated troponin [R77.8] 07/14/2022 Yes    BPH (benign prostatic hyperplasia) [N40.0] 07/14/2022 Yes    Alzheimer's dementia [G30.9, F02.80] 07/14/2022 Yes    UTI (urinary tract infection)- Ruled out  [N39.0] 07/15/2022 Yes    Orthostatic hypotension [I95.1] 07/16/2022 Yes    Leukocytosis [D72.829] 07/15/2022 Yes      Problems Resolved During this Admission:       Discharged Condition: stable    Disposition: Skilled Nursing Facility    Follow Up:   Follow-up Information     Bj Clayton MD. Schedule an appointment as soon as possible for a visit in 3 day(s).    Specialty: Internal Medicine  Why: Discharge follow up  Contact information:  [address] [facility] [id number] [phone]             Ahmet Gill MD. Schedule an appointment as soon as possible for a visit in 3 week(s).    Specialty: Hematology and Oncology  Why: Hematology follow up for blood clots  Contact information:  [address]  [place] [id number] [phone]                       Patient Instructions:      Ambulatory referral/consult to [facility]   Standing Status: Future   Referral Priority: Routine Referral Type: Consultation   Referral Reason: Specialty Services Required   Number of Visits Requested: 1     Diet Dysphagia Pureed     Activity as tolerated       Significant Diagnostic Studies: Labs: BMP: No results for input(s): GLU, NA, K, CL, CO2, BUN, CREATININE, CALCIUM, MG in the last 48 hours., CMP No results for input(s): NA, K, CL, CO2, GLU, BUN, CREATININE, CALCIUM, PROT, ALBUMIN, BILITOT, ALKPHOS, AST, ALT, ANIONGAP, ESTGFRAFRICA, EGFRNONAA in the last 48 hours. and CBC No results for input(s): WBC, HGB, HCT, PLT in the last 48 hours.    Pending Diagnostic Studies:     None         Medications:  Reconciled Home Medications:      Medication List      START taking these medications    ELIQUIS 5 mg Tab  Generic drug:  apixaban  Two po twice daily x 3 days, then one po bid thereafter for maintenance blood thinner for blood clots     lactulose 10 gram/15 mL solution  Commonly known as: CHRONULAC  Take 30 mLs (20 g total) by mouth once daily. for 14 days     midodrine 5 MG Tab  Commonly known as: PROAMATINE  Take 1 tablet (5 mg total) by mouth 3 (three) times daily with meals.        CONTINUE taking these medications    atorvastatin 10 MG tablet  Commonly known as: LIPITOR  Take 10 mg by mouth every evening.     docusate sodium 100 MG capsule  Commonly known as: COLACE  Take 100 mg by mouth 2 (two) times daily.     finasteride 5 mg tablet  Commonly known as: PROSCAR  Take 5 mg by mouth once daily.     folic acid 1 MG tablet  Commonly known as: FOLVITE  Take 1 mg by mouth once daily.     metoprolol tartrate 25 MG tablet  Commonly known as: LOPRESSOR  Take 12.5 mg by mouth 2 (two) times daily.     MILK OF MAGNESIA 400 mg/5 mL Susp  Generic drug: magnesium hydroxide 400 mg/5 ml  Take 20 mLs by mouth daily as needed.     nystatin ointment  Commonly known as: MYCOSTATIN  Apply topically 2 (two) times daily.     polyethylene glycol 17 gram/dose powder  Commonly known as: GLYCOLAX  Take 17 g by mouth once daily.     tamsulosin 0.4 mg Cap  Commonly known as: FLOMAX  Take 0.4 mg by mouth once daily.            Indwelling Lines/Drains at time of discharge:   Lines/Drains/Airways     Drain  Duration                Urethral Catheter 07/14/22 1800 10 days                Time spent on the discharge of patient: 30  minutes         Heath Henderson MD  Department of Hospital Medicine  [facility])

## 2022-07-29 ENCOUNTER — HOSPITAL ENCOUNTER (INPATIENT)
Facility: HOSPITAL | Age: 82
LOS: 4 days | Discharge: NURSING, REHAB OR LONG-TERM CARE | DRG: 682 | End: 2022-08-03
Attending: EMERGENCY MEDICINE | Admitting: INTERNAL MEDICINE
Payer: MEDICARE

## 2022-07-29 DIAGNOSIS — I26.02 ACUTE SADDLE PULMONARY EMBOLISM WITH ACUTE COR PULMONALE: ICD-10-CM

## 2022-07-29 DIAGNOSIS — R58 INTRAABDOMINAL HEMORRHAGE: ICD-10-CM

## 2022-07-29 DIAGNOSIS — R33.9 URINARY RETENTION: Primary | ICD-10-CM

## 2022-07-29 DIAGNOSIS — N17.9 AKI (ACUTE KIDNEY INJURY): ICD-10-CM

## 2022-07-29 LAB
ABO + RH BLD: NORMAL
ALBUMIN SERPL BCP-MCNC: 3.2 G/DL (ref 3.5–5.2)
ALP SERPL-CCNC: 77 U/L (ref 55–135)
ALT SERPL W/O P-5'-P-CCNC: 13 U/L (ref 10–44)
ANION GAP SERPL CALC-SCNC: 13 MMOL/L (ref 8–16)
AST SERPL-CCNC: 16 U/L (ref 10–40)
BASOPHILS # BLD AUTO: 0.05 K/UL (ref 0–0.2)
BASOPHILS NFR BLD: 0.2 % (ref 0–1.9)
BILIRUB SERPL-MCNC: 0.8 MG/DL (ref 0.1–1)
BLD GP AB SCN CELLS X3 SERPL QL: NORMAL
BUN SERPL-MCNC: 20 MG/DL (ref 8–23)
CALCIUM SERPL-MCNC: 9.8 MG/DL (ref 8.7–10.5)
CHLORIDE SERPL-SCNC: 95 MMOL/L (ref 95–110)
CO2 SERPL-SCNC: 24 MMOL/L (ref 23–29)
CREAT SERPL-MCNC: 2.3 MG/DL (ref 0.5–1.4)
CTP QC/QA: YES
DIFFERENTIAL METHOD: ABNORMAL
EOSINOPHIL # BLD AUTO: 0 K/UL (ref 0–0.5)
EOSINOPHIL NFR BLD: 0 % (ref 0–8)
ERYTHROCYTE [DISTWIDTH] IN BLOOD BY AUTOMATED COUNT: 14.1 % (ref 11.5–14.5)
EST. GFR  (AFRICAN AMERICAN): 30 ML/MIN/1.73 M^2
EST. GFR  (NON AFRICAN AMERICAN): 26 ML/MIN/1.73 M^2
GLUCOSE SERPL-MCNC: 127 MG/DL (ref 70–110)
HCT VFR BLD AUTO: 31.3 % (ref 40–54)
HGB BLD-MCNC: 10.5 G/DL (ref 14–18)
IMM GRANULOCYTES # BLD AUTO: 0.13 K/UL (ref 0–0.04)
IMM GRANULOCYTES NFR BLD AUTO: 0.6 % (ref 0–0.5)
LYMPHOCYTES # BLD AUTO: 1.7 K/UL (ref 1–4.8)
LYMPHOCYTES NFR BLD: 7.6 % (ref 18–48)
MCH RBC QN AUTO: 29.7 PG (ref 27–31)
MCHC RBC AUTO-ENTMCNC: 33.5 G/DL (ref 32–36)
MCV RBC AUTO: 88 FL (ref 82–98)
MONOCYTES # BLD AUTO: 1.8 K/UL (ref 0.3–1)
MONOCYTES NFR BLD: 8.3 % (ref 4–15)
NEUTROPHILS # BLD AUTO: 18.3 K/UL (ref 1.8–7.7)
NEUTROPHILS NFR BLD: 83.3 % (ref 38–73)
NRBC BLD-RTO: 0 /100 WBC
PLATELET # BLD AUTO: 299 K/UL (ref 150–450)
PMV BLD AUTO: 10.2 FL (ref 9.2–12.9)
POTASSIUM SERPL-SCNC: 5.1 MMOL/L (ref 3.5–5.1)
PROT SERPL-MCNC: 6.2 G/DL (ref 6–8.4)
RBC # BLD AUTO: 3.54 M/UL (ref 4.6–6.2)
SARS-COV-2 RDRP RESP QL NAA+PROBE: NEGATIVE
SODIUM SERPL-SCNC: 132 MMOL/L (ref 136–145)
WBC # BLD AUTO: 21.97 K/UL (ref 3.9–12.7)

## 2022-07-29 PROCEDURE — 80053 COMPREHEN METABOLIC PANEL: CPT | Performed by: EMERGENCY MEDICINE

## 2022-07-29 PROCEDURE — 63600175 PHARM REV CODE 636 W HCPCS: Performed by: EMERGENCY MEDICINE

## 2022-07-29 PROCEDURE — 86901 BLOOD TYPING SEROLOGIC RH(D): CPT | Performed by: EMERGENCY MEDICINE

## 2022-07-29 PROCEDURE — 25000003 PHARM REV CODE 250: Performed by: EMERGENCY MEDICINE

## 2022-07-29 PROCEDURE — 87040 BLOOD CULTURE FOR BACTERIA: CPT | Performed by: EMERGENCY MEDICINE

## 2022-07-29 PROCEDURE — 36415 COLL VENOUS BLD VENIPUNCTURE: CPT | Performed by: EMERGENCY MEDICINE

## 2022-07-29 PROCEDURE — 83605 ASSAY OF LACTIC ACID: CPT | Performed by: EMERGENCY MEDICINE

## 2022-07-29 PROCEDURE — 99291 CRITICAL CARE FIRST HOUR: CPT | Mod: 25

## 2022-07-29 PROCEDURE — G0378 HOSPITAL OBSERVATION PER HR: HCPCS

## 2022-07-29 PROCEDURE — 85025 COMPLETE CBC W/AUTO DIFF WBC: CPT | Performed by: EMERGENCY MEDICINE

## 2022-07-29 PROCEDURE — 25000003 PHARM REV CODE 250: Performed by: NURSE PRACTITIONER

## 2022-07-29 PROCEDURE — 84145 PROCALCITONIN (PCT): CPT | Performed by: NURSE PRACTITIONER

## 2022-07-29 PROCEDURE — 25500020 PHARM REV CODE 255: Performed by: EMERGENCY MEDICINE

## 2022-07-29 PROCEDURE — 81000 URINALYSIS NONAUTO W/SCOPE: CPT | Performed by: EMERGENCY MEDICINE

## 2022-07-29 PROCEDURE — U0002 COVID-19 LAB TEST NON-CDC: HCPCS | Performed by: EMERGENCY MEDICINE

## 2022-07-29 PROCEDURE — 85025 COMPLETE CBC W/AUTO DIFF WBC: CPT | Mod: 91 | Performed by: NURSE PRACTITIONER

## 2022-07-29 PROCEDURE — A4216 STERILE WATER/SALINE, 10 ML: HCPCS | Performed by: NURSE PRACTITIONER

## 2022-07-29 RX ORDER — ACETAMINOPHEN 325 MG/1
650 TABLET ORAL EVERY 8 HOURS PRN
Status: DISCONTINUED | OUTPATIENT
Start: 2022-07-29 | End: 2022-08-01

## 2022-07-29 RX ORDER — MORPHINE SULFATE 4 MG/ML
4 INJECTION, SOLUTION INTRAMUSCULAR; INTRAVENOUS EVERY 4 HOURS PRN
Status: DISCONTINUED | OUTPATIENT
Start: 2022-07-29 | End: 2022-08-02

## 2022-07-29 RX ORDER — FOLIC ACID 1 MG/1
1 TABLET ORAL DAILY
Status: DISCONTINUED | OUTPATIENT
Start: 2022-07-30 | End: 2022-08-03 | Stop reason: HOSPADM

## 2022-07-29 RX ORDER — MAG HYDROX/ALUMINUM HYD/SIMETH 200-200-20
30 SUSPENSION, ORAL (FINAL DOSE FORM) ORAL 4 TIMES DAILY PRN
Status: DISCONTINUED | OUTPATIENT
Start: 2022-07-29 | End: 2022-08-03 | Stop reason: HOSPADM

## 2022-07-29 RX ORDER — MIDODRINE HYDROCHLORIDE 5 MG/1
5 TABLET ORAL
Status: DISCONTINUED | OUTPATIENT
Start: 2022-07-30 | End: 2022-08-02

## 2022-07-29 RX ORDER — NALOXONE HCL 0.4 MG/ML
0.02 VIAL (ML) INJECTION
Status: DISCONTINUED | OUTPATIENT
Start: 2022-07-29 | End: 2022-08-03 | Stop reason: HOSPADM

## 2022-07-29 RX ORDER — OXYCODONE HYDROCHLORIDE 5 MG/1
5 TABLET ORAL EVERY 6 HOURS PRN
Status: DISCONTINUED | OUTPATIENT
Start: 2022-07-29 | End: 2022-08-02

## 2022-07-29 RX ORDER — METOPROLOL TARTRATE 25 MG/1
25 TABLET, FILM COATED ORAL 2 TIMES DAILY
Status: DISCONTINUED | OUTPATIENT
Start: 2022-07-29 | End: 2022-08-03 | Stop reason: HOSPADM

## 2022-07-29 RX ORDER — LACTULOSE 10 G/15ML
20 SOLUTION ORAL DAILY PRN
Status: DISCONTINUED | OUTPATIENT
Start: 2022-07-29 | End: 2022-08-03 | Stop reason: HOSPADM

## 2022-07-29 RX ORDER — ONDANSETRON 8 MG/1
8 TABLET, ORALLY DISINTEGRATING ORAL EVERY 8 HOURS PRN
Status: DISCONTINUED | OUTPATIENT
Start: 2022-07-29 | End: 2022-08-03 | Stop reason: HOSPADM

## 2022-07-29 RX ORDER — FINASTERIDE 5 MG/1
5 TABLET, FILM COATED ORAL DAILY
Status: DISCONTINUED | OUTPATIENT
Start: 2022-07-30 | End: 2022-08-03 | Stop reason: HOSPADM

## 2022-07-29 RX ORDER — ATORVASTATIN CALCIUM 10 MG/1
10 TABLET, FILM COATED ORAL DAILY
Status: DISCONTINUED | OUTPATIENT
Start: 2022-07-30 | End: 2022-08-03 | Stop reason: HOSPADM

## 2022-07-29 RX ORDER — SODIUM CHLORIDE 0.9 % (FLUSH) 0.9 %
10 SYRINGE (ML) INJECTION EVERY 8 HOURS
Status: DISCONTINUED | OUTPATIENT
Start: 2022-07-29 | End: 2022-08-03 | Stop reason: HOSPADM

## 2022-07-29 RX ORDER — TAMSULOSIN HYDROCHLORIDE 0.4 MG/1
0.4 CAPSULE ORAL NIGHTLY
Status: DISCONTINUED | OUTPATIENT
Start: 2022-07-29 | End: 2022-08-03 | Stop reason: HOSPADM

## 2022-07-29 RX ORDER — DOCUSATE SODIUM 100 MG/1
100 CAPSULE, LIQUID FILLED ORAL 2 TIMES DAILY
Status: DISCONTINUED | OUTPATIENT
Start: 2022-07-29 | End: 2022-08-01

## 2022-07-29 RX ORDER — PROMETHAZINE HYDROCHLORIDE 25 MG/1
25 TABLET ORAL EVERY 6 HOURS PRN
Status: DISCONTINUED | OUTPATIENT
Start: 2022-07-29 | End: 2022-08-02

## 2022-07-29 RX ORDER — ACETAMINOPHEN 325 MG/1
650 TABLET ORAL EVERY 4 HOURS PRN
Status: DISCONTINUED | OUTPATIENT
Start: 2022-07-29 | End: 2022-08-01

## 2022-07-29 RX ORDER — SODIUM CHLORIDE 9 MG/ML
1000 INJECTION, SOLUTION INTRAVENOUS
Status: COMPLETED | OUTPATIENT
Start: 2022-07-29 | End: 2022-07-29

## 2022-07-29 RX ORDER — TALC
6 POWDER (GRAM) TOPICAL NIGHTLY PRN
Status: DISCONTINUED | OUTPATIENT
Start: 2022-07-29 | End: 2022-08-03 | Stop reason: HOSPADM

## 2022-07-29 RX ORDER — SODIUM CHLORIDE 0.9 % (FLUSH) 0.9 %
10 SYRINGE (ML) INJECTION EVERY 12 HOURS PRN
Status: DISCONTINUED | OUTPATIENT
Start: 2022-07-29 | End: 2022-08-03 | Stop reason: HOSPADM

## 2022-07-29 RX ORDER — SODIUM CHLORIDE 9 MG/ML
INJECTION, SOLUTION INTRAVENOUS CONTINUOUS
Status: DISCONTINUED | OUTPATIENT
Start: 2022-07-30 | End: 2022-07-31

## 2022-07-29 RX ORDER — SIMETHICONE 80 MG
1 TABLET,CHEWABLE ORAL 4 TIMES DAILY PRN
Status: DISCONTINUED | OUTPATIENT
Start: 2022-07-29 | End: 2022-08-03 | Stop reason: HOSPADM

## 2022-07-29 RX ADMIN — SODIUM CHLORIDE, SODIUM LACTATE, POTASSIUM CHLORIDE, AND CALCIUM CHLORIDE 1000 ML: .6; .31; .03; .02 INJECTION, SOLUTION INTRAVENOUS at 08:07

## 2022-07-29 RX ADMIN — SODIUM CHLORIDE 1000 ML: 0.9 INJECTION, SOLUTION INTRAVENOUS at 11:07

## 2022-07-29 RX ADMIN — Medication 10 ML: at 11:07

## 2022-07-29 RX ADMIN — IOHEXOL 100 ML: 350 INJECTION, SOLUTION INTRAVENOUS at 09:07

## 2022-07-29 NOTE — Clinical Note
Diagnosis: Intraabdominal hemorrhage [[id number]]   Future Attending Provider: ROBERT VINSON [1976]   Admitting Provider:: ROBERT VINSON [1976]

## 2022-07-29 NOTE — ED PROVIDER NOTES
SCRIBE #1 NOTE: I, Anabelle Vallecillo, am scribing for, and in the presence of, Luke Omer MD. I have scribed the HPI, ROS, and PEx.     SCRIBE #2 NOTE: I, Connor Gatica, am scribing for, and in the presence of,  Tamie Smith MD. I have scribed the remaining portions of the note not scribed by Scribe #1.      History     Chief Complaint   Patient presents with    Urinary Retention     Pt arrives via EMS from [facility] with reports of no urine output x 8 hrs. 20Fr indwelling catheter in place. Pt baseline is GCS 14.      Review of patient's allergies indicates:  No Known Allergies      History of Present Illness     HPI    7/29/2022, 4:41 PM  History obtained from the patient      History of Present Illness: Riley Saba is a 82 y.o. male patient with a PMHx of HLD, supraventricular tachycardia, and alzheimer's disease who presents to the Emergency Department for evaluation of urinary retention which onset this AM. Pt has had a mendes catheter since June 6th. Pt's family member states there was urine output last night and when the catheter was changed this AM urine output stopped.  Pt drank 2 cups of water last PM and two cups of water this AM. Pt also has a history of prostate issues. Pt's last bowel movment was yesterday.Symptoms are constant and moderate in severity. No mitigating or exacerbating factors reported. No associated sxs reported. Patient denies any hematuria, fever, chills, abdominal pain, N/V and all other sxs at this time. No prior Tx reported. No further complaints or concerns at this time.       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       Family History:  No family history on file.    Social History:  Social History     Tobacco Use    Smoking status: Not on file    Smokeless tobacco: Not on file   Substance and Sexual Activity    Alcohol use: Not on file    Drug use: Not on file    Sexual activity: Not on file        Review of Systems     Review of Systems   Constitutional: Negative for chills and fever.   HENT: Negative for sore throat.    Respiratory: Negative for shortness of breath.    Cardiovascular: Negative for chest pain.   Gastrointestinal: Negative for abdominal pain, nausea and vomiting.   Genitourinary: Negative for dysuria and hematuria.        (+) urinary retention   Musculoskeletal: Negative for back pain.   Skin: Negative for rash.   Neurological: Negative for weakness.   Hematological: Does not bruise/bleed easily.   All other systems reviewed and are negative.     Physical Exam     Initial Vitals [07/29/22 1529]   BP Pulse Resp Temp SpO2   (!) 140/93 (!) 115 (!) 30 97.5 °F (36.4 °C) 98 %      MAP       --          Physical Exam  Nursing Notes and Vital Signs Reviewed.  Constitutional: Patient is in no acute distress.   Head: Atraumatic. Normocephalic.  Eyes: PERRL. EOM intact. Conjunctivae are not pale. No scleral icterus.  ENT: Dry mucous membranes. Oropharynx is clear and symmetric.    Neck: Supple. Full ROM.   Cardiovascular: Regular rate. Regular rhythm. No murmurs, rubs, or gallops. Distal pulses are 2+ and symmetric.  Pulmonary/Chest: No respiratory distress. Clear to auscultation bilaterally. No wheezing or rales.  Abdominal: Soft and non-distended.  There is no tenderness.  No rebound, guarding, or rigidity.  Genitourinary: Suprapubic fullness  Musculoskeletal: Moves all extremities. No obvious deformities. No edema.   Skin: Warm and dry.  Neurological:  Alert, awake, and appropriate.   No acute focal neurological deficits are appreciated.Dementia at baseline orientation.  Psychiatric: Normal affect. Good eye  "contact. Appropriate in content.     ED Course   Critical Care    Date/Time: 7/29/2022 10:15 PM  Performed by: Nicho Willett Do, MD  Authorized by: Nicho Willett Do, MD   Direct patient critical care time: 25 minutes  Additional history critical care time: 5 minutes  Ordering / reviewing critical care time: 15 minutes  Documentation critical care time: 5 minutes  Total critical care time (exclusive of procedural time) : 50 minutes  Critical care time was exclusive of separately billable procedures and treating other patients and teaching time.  Critical care was necessary to treat or prevent imminent or life-threatening deterioration of the following conditions: Sepsis, uninary retention, and intra-abdominal hemorrhage.  Critical care was time spent personally by me on the following activities: blood draw for specimens, development of treatment plan with patient or surrogate, discussions with consultants, evaluation of patient's response to treatment, interpretation of cardiac output measurements, examination of patient, obtaining history from patient or surrogate, ordering and performing treatments and interventions, ordering and review of laboratory studies, ordering and review of radiographic studies, pulse oximetry, review of old charts and re-evaluation of patient's condition.        ED Vital Signs:  Vitals:    07/29/22 1529 07/29/22 1729 07/29/22 1758 07/29/22 1759   BP: (!) 140/93 118/75  107/67   Pulse: (!) 115 104 104 104   Resp: (!) 30 (!) 31 (!) 25 (!) 30   Temp: 97.5 °F (36.4 °C)      TempSrc: Axillary      SpO2: 98% 96% 96% 95%   Weight: 69.9 kg (154 lb)      Height: 5' 11" (1.803 m)       07/29/22 1817 07/29/22 2032 07/29/22 2114 07/29/22 2330   BP: 108/77 124/67 125/62 (!) 113/58   Pulse:  104 107 103   Resp:  (!) 25 (!) 27 (!) 22   Temp:       TempSrc:       SpO2:  98% 95% 97%   Weight:       Height:        07/30/22 0031 07/30/22 0102 07/30/22 0106   BP: (!) 110/58 (!) 110/57    Pulse: 102 102 102 "   Resp: (!) 25 (!) 26 19   Temp:      TempSrc:      SpO2: 97% 96% 97%   Weight:      Height:          Abnormal Lab Results:  Labs Reviewed   CBC W/ AUTO DIFFERENTIAL - Abnormal; Notable for the following components:       Result Value    WBC 21.97 (*)     RBC 3.54 (*)     Hemoglobin 10.5 (*)     Hematocrit 31.3 (*)     Immature Granulocytes 0.6 (*)     Gran # (ANC) 18.3 (*)     Immature Grans (Abs) 0.13 (*)     Mono # 1.8 (*)     Gran % 83.3 (*)     Lymph % 7.6 (*)     All other components within normal limits   COMPREHENSIVE METABOLIC PANEL - Abnormal; Notable for the following components:    Sodium 132 (*)     Glucose 127 (*)     Creatinine 2.3 (*)     Albumin 3.2 (*)     eGFR if  30 (*)     eGFR if non  26 (*)     All other components within normal limits   URINALYSIS, REFLEX TO URINE CULTURE - Abnormal; Notable for the following components:    Protein, UA Trace (*)     Occult Blood UA 2+ (*)     Leukocytes, UA Trace (*)     All other components within normal limits    Narrative:     Specimen Source->Urine   LACTIC ACID, PLASMA - Abnormal; Notable for the following components:    Lactate (Lactic Acid) 2.3 (*)     All other components within normal limits   CBC W/ AUTO DIFFERENTIAL - Abnormal; Notable for the following components:    WBC 23.85 (*)     RBC 3.01 (*)     Hemoglobin 8.9 (*)     Hematocrit 26.7 (*)     Gran # (ANC) 20.1 (*)     Immature Grans (Abs) 0.12 (*)     Mono # 2.2 (*)     Gran % 84.1 (*)     Lymph % 5.9 (*)     All other components within normal limits   URINALYSIS, REFLEX TO URINE CULTURE - Abnormal; Notable for the following components:    Protein, UA Trace (*)     Occult Blood UA 2+ (*)     Leukocytes, UA Trace (*)     All other components within normal limits    Narrative:     Specimen Source->Urine   PROCALCITONIN - Abnormal; Notable for the following components:    Procalcitonin 0.27 (*)     All other components within normal limits   CULTURE, BLOOD    CULTURE, BLOOD   URINALYSIS MICROSCOPIC    Narrative:     Specimen Source->Urine   COMPREHENSIVE METABOLIC PANEL   SARS-COV-2 RDRP GENE    Narrative:     This test utilizes isothermal nucleic acid amplification   technology to detect the SARS-CoV-2 RdRp nucleic acid segment.   The analytical sensitivity (limit of detection) is 125 genome   equivalents/mL.   A POSITIVE result implies infection with the SARS-CoV-2 virus;   the patient is presumed to be contagious.     A NEGATIVE result means that SARS-CoV-2 nucleic acids are not   present above the limit of detection. A NEGATIVE result should be   treated as presumptive. It does not rule out the possibility of   COVID-19 and should not be the sole basis for treatment decisions.   If COVID-19 is strongly suspected based on clinical and exposure   history, re-testing using an alternate molecular assay should be   considered.   This test is only for use under the Food and Drug   Administration s Emergency Use Authorization (EUA).   Commercial kits are provided by [employer].   Performance characteristics of the EUA have been independently   verified by [facility] Department of   Pathology and Laboratory Medicine.   _________________________________________________________________   The authorized Fact Sheet for Healthcare Providers and the authorized Fact   Sheet for Patients of the ID NOW COVID-19 are available on the FDA   website:     https://www.fda.gov/media/254424/download  https://www.fda.gov/media/648068/download           TYPE & SCREEN        All Lab Results:  Results for orders placed or performed during the hospital encounter of 07/29/22   CBC auto differential   Result Value Ref Range    WBC 21.97 (H) 3.90 - 12.70 K/uL    RBC 3.54 (L) 4.60 - 6.20 M/uL    Hemoglobin 10.5 (L) 14.0 - 18.0 g/dL    Hematocrit 31.3 (L) 40.0 - 54.0 %    MCV 88 82 - 98 fL    MCH 29.7 27.0 - 31.0 pg    MCHC 33.5 32.0 - 36.0 g/dL    RDW 14.1 11.5 - 14.5 %     Platelets 299 150 - 450 K/uL    MPV 10.2 9.2 - 12.9 fL    Immature Granulocytes 0.6 (H) 0.0 - 0.5 %    Gran # (ANC) 18.3 (H) 1.8 - 7.7 K/uL    Immature Grans (Abs) 0.13 (H) 0.00 - 0.04 K/uL    Lymph # 1.7 1.0 - 4.8 K/uL    Mono # 1.8 (H) 0.3 - 1.0 K/uL    Eos # 0.0 0.0 - 0.5 K/uL    Baso # 0.05 0.00 - 0.20 K/uL    nRBC 0 0 /100 WBC    Gran % 83.3 (H) 38.0 - 73.0 %    Lymph % 7.6 (L) 18.0 - 48.0 %    Mono % 8.3 4.0 - 15.0 %    Eosinophil % 0.0 0.0 - 8.0 %    Basophil % 0.2 0.0 - 1.9 %    Differential Method Automated    Comprehensive metabolic panel   Result Value Ref Range    Sodium 132 (L) 136 - 145 mmol/L    Potassium 5.1 3.5 - 5.1 mmol/L    Chloride 95 95 - 110 mmol/L    CO2 24 23 - 29 mmol/L    Glucose 127 (H) 70 - 110 mg/dL    BUN 20 8 - 23 mg/dL    Creatinine 2.3 (H) 0.5 - 1.4 mg/dL    Calcium 9.8 8.7 - 10.5 mg/dL    Total Protein 6.2 6.0 - 8.4 g/dL    Albumin 3.2 (L) 3.5 - 5.2 g/dL    Total Bilirubin 0.8 0.1 - 1.0 mg/dL    Alkaline Phosphatase 77 55 - 135 U/L    AST 16 10 - 40 U/L    ALT 13 10 - 44 U/L    Anion Gap 13 8 - 16 mmol/L    eGFR if African American 30 (A) >60 mL/min/1.73 m^2    eGFR if non African American 26 (A) >60 mL/min/1.73 m^2   Urinalysis, Reflex to Urine Culture Urine, Clean Catch    Specimen: Urine   Result Value Ref Range    Specimen UA Urine, Catheterized     Color, UA Yellow Yellow, Straw, Yamilet    Appearance, UA Clear Clear    pH, UA 6.0 5.0 - 8.0    Specific Gravity, UA 1.010 1.005 - 1.030    Protein, UA Trace (A) Negative    Glucose, UA Negative Negative    Ketones, UA Negative Negative    Bilirubin (UA) Negative Negative    Occult Blood UA 2+ (A) Negative    Nitrite, UA Negative Negative    Urobilinogen, UA Negative <2.0 EU/dL    Leukocytes, UA Trace (A) Negative   Lactic acid, plasma   Result Value Ref Range    Lactate (Lactic Acid) 2.3 (H) 0.5 - 2.2 mmol/L   CBC with Automated Differential   Result Value Ref Range    WBC 23.85 (H) 3.90 - 12.70 K/uL    RBC 3.01 (L) 4.60 - 6.20 M/uL     Hemoglobin 8.9 (L) 14.0 - 18.0 g/dL    Hematocrit 26.7 (L) 40.0 - 54.0 %    MCV 89 82 - 98 fL    MCH 29.6 27.0 - 31.0 pg    MCHC 33.3 32.0 - 36.0 g/dL    RDW 14.4 11.5 - 14.5 %    Platelets 252 150 - 450 K/uL    MPV 10.2 9.2 - 12.9 fL    Immature Granulocytes 0.5 0.0 - 0.5 %    Gran # (ANC) 20.1 (H) 1.8 - 7.7 K/uL    Immature Grans (Abs) 0.12 (H) 0.00 - 0.04 K/uL    Lymph # 1.4 1.0 - 4.8 K/uL    Mono # 2.2 (H) 0.3 - 1.0 K/uL    Eos # 0.0 0.0 - 0.5 K/uL    Baso # 0.06 0.00 - 0.20 K/uL    nRBC 0 0 /100 WBC    Gran % 84.1 (H) 38.0 - 73.0 %    Lymph % 5.9 (L) 18.0 - 48.0 %    Mono % 9.1 4.0 - 15.0 %    Eosinophil % 0.1 0.0 - 8.0 %    Basophil % 0.3 0.0 - 1.9 %    Platelet Estimate Appears normal     Aniso Slight     Ovalocytes Occasional     Differential Method Automated    Urinalysis, Reflex to Urine Culture Urine, Clean Catch    Specimen: Urine   Result Value Ref Range    Specimen UA Urine, Catheterized     Color, UA Yellow Yellow, Straw, Yamilet    Appearance, UA Clear Clear    pH, UA 6.0 5.0 - 8.0    Specific Gravity, UA 1.010 1.005 - 1.030    Protein, UA Trace (A) Negative    Glucose, UA Negative Negative    Ketones, UA Negative Negative    Bilirubin (UA) Negative Negative    Occult Blood UA 2+ (A) Negative    Nitrite, UA Negative Negative    Urobilinogen, UA Negative <2.0 EU/dL    Leukocytes, UA Trace (A) Negative   Procalcitonin   Result Value Ref Range    Procalcitonin 0.27 (H) <0.25 ng/mL   Urinalysis Microscopic   Result Value Ref Range    RBC, UA 2 0 - 4 /hpf    WBC, UA 1 0 - 5 /hpf    Microscopic Comment SEE COMMENT    POCT COVID-19 Rapid Screening   Result Value Ref Range    POC Rapid COVID Negative Negative     Acceptable Yes    Type & Screen   Result Value Ref Range    Group & Rh O POS     Indirect [place] NEG          Imaging Results:  Imaging Results          X-Ray Chest AP Portable (In process)                 CTA Abdomen and Pelvis (Final result)  Result time 07/29/22 21:17:11    Final  result by Jonah Powers MD (07/29/22 21:17:11)                 Impression:      No evidence of ongoing arterial bleeding most notably within the left upper quadrant there is no evidence of contrast extravasation on arterial or delayed phase of imaging.    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    Exam are proximally stable allowing for differences in contrast administration in comparison to the prior noncontrast exam of the same date.    Details as above.    This report was flagged in Epic as abnormal.    All CT scans at this facility are performed  using dose modulation techniques as appropriate to performed exam including the following:  automated exposure control; adjustment of mA and/or kV according to the patients size (this includes techniques or standardized protocols for targeted exams where dose is matched to indication/reason for exam: i.e. extremities or head);  iterative reconstruction technique.      Electronically signed by: Jonah Pwoers  Date:    07/29/2022  Time:    21:17             Narrative:    EXAMINATION:  CTA ABDOMEN AND PELVIS    CLINICAL HISTORY:  GI bleed;    TECHNIQUE:  Low dose axial images, sagittal and coronal reformations were obtained from the lung bases to the pubic symphysis.  Contrast was administered.  100 mL Omnipaque 350 IV contrast was administered with images obtained before and after the administration of contrast in a CT angiography protocol.  3D/MIP reformats were generated.    COMPARISON:  Multiple priors.    FINDINGS:  Heart: Normal in size. No pericardial effusion.    Lung Bases: Small bilateral pleural effusions.    Liver: Normal in size and attenuation, with no focal hepatic lesions.    Gallbladder: Partially filled with sludge.    Bile Ducts: No evidence of dilated ducts.    Pancreas: No mass or peripancreatic fat stranding.    Spleen: Unremarkable.    Adrenals: No thickening or nodularity.    Kidneys/ Ureters: No  hydronephrosis.  No obstructive uropathy.  Normal uptake and excretion of contrast.    Bladder: Bladder wall thickening stable from the prior exam.  Gramajo catheter appears in place within the bladder.    Reproductive organs: Moderate prostatomegaly.    GI Tract/Mesentery: No evidence of bowel obstruction or inflammation.  No evidence of ongoing GI bleeding.  Similar appearance of the large bowel.    Peritoneal Space: Stable appearance of the left upper quadrant predominant moderate to large volume of high attenuation free fluid concerning for hemorrhage.  No postcontrast enhancement throughout this area or pooling of contrast on delayed phase of imaging to suggest active bleeding.  Source of bleeding is not identified, and may be remote.  Moderate low-density perihepatic fluid stable.  No free air.    Retroperitoneum: No significant adenopathy.    Abdominal wall: Unremarkable.    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  Additional severe stenosis just distal to the area of poststenotic dilation such as on series 4, image 172.  SMA appears patent.  MIL is patent.    Bones: No acute fracture.  Age expected degenerative changes.                                CT Renal Stone Study ABD Pelvis WO (Final result)  Result time 07/29/22 17:22:39    Final result by Jonah Powers MD (07/29/22 17:22:39)                 Impression:      Moderate to large volume of left greater than right high density peritoneal fluid concerning for hemorrhage.    Additionally there is a moderate volume of simple perihepatic abdominal free fluid.    Small bilateral pleural effusions with associated compressive atelectasis.    Moderate to severe dilation of the distal colon and rectum of unclear etiology. Correlation for stricture.    Gramajo catheter in place within the bladder. Bladder wall thickening which may relate to infection, L obstruction, or neurogenic bladder. Correlation is advised.    Findings discussed  with Dr. Omer prior to dictation.    This report was flagged in Epic as abnormal.    All CT scans at this facility are performed  using dose modulation techniques as appropriate to performed exam including the following:  automated exposure control; adjustment of mA and/or kV according to the patients size (this includes techniques or standardized protocols for targeted exams where dose is matched to indication/reason for exam: i.e. extremities or head);  iterative reconstruction technique.      Electronically signed by: Jonah Powers  Date:    07/29/2022  Time:    17:22             Narrative:    EXAMINATION:  CT RENAL STONE STUDY ABD PELVIS WO    CLINICAL HISTORY:  anuria;    TECHNIQUE:  Low dose axial images, sagittal and coronal reformations were obtained from the lung bases to the pubic symphysis.  Contrast was not administered.    COMPARISON:  None    FINDINGS:  Heart: Normal in size. No pericardial effusion.    Lung Bases: Small bilateral pleural effusions with associated compressive atelectasis.    Liver: Slightly small size liver.  No focal lesions.    Gallbladder: Gallbladder partially filled with sludge.  No findings to definitively suggest acute cholecystitis.    Bile Ducts: No evidence of dilated ducts.    Pancreas: No mass or peripancreatic fat stranding.    Spleen: Unremarkable.    Adrenals: Unremarkable.    Kidneys/ Ureters: No definite hydronephrosis.  No obstructive uropathy.  No nonobstructive nephrolithiasis.    Bladder: Gramajo catheter in place within the bladder.  Bladder wall thickening which may relate to infection, L obstruction, or neurogenic bladder.  Correlation is advised.    Reproductive organs: Moderate prostatomegaly.    GI Tract/Mesentery: Moderate to severe dilation of the distal colon and rectum of unclear etiology.  Correlation for stricture.  Otherwise the bowel appears grossly within normal limits.  Appendix is not well delineated.    Peritoneal Space: Moderate to large volume  of left greater than right high density peritoneal fluid concerning for hemorrhage.  Additionally there is a moderate volume of simple perihepatic abdominal free fluid.    Retroperitoneum: No significant adenopathy.    Abdominal wall: Unremarkable.    Vasculature: No significant atherosclerosis or aneurysm.    Bones: No acute fracture.  Age expected degenerative change.  No definite fracture.                                 The Emergency Provider reviewed the vital signs and test results, which are outlined above.     ED Discussion     5:21 PM: Radiologist (Gio) noted intra adominal blood on CT    8:04 PM: Dr. Omer transfers care of patient to Dr. Smith pending lab results.      10:13 PM: Discussed pt's case with Dr. Wellington (General Surgery) who recommends admission to Hospital Medicine for serial H/H, holding anticoagulants until it stabilizes.    10:14 PM: Discussed case with Anna Preciado NP (Hospital Medicine). Dr. Ramey agrees with current care and management of pt and accepts admission.   Admitting Service: Hospital Medicine  Admitting Physician: Dr. Ramey  Admit to: obs med tele    10:14 PM: Re-evaluated pt. I have discussed test results, shared treatment plan, and the need for admission with patient and family at bedside. Pt and family express understanding at this time and agree with all information. All questions answered. Pt and family have no further questions or concerns at this time. Pt is ready for admit.      ED Course as of 07/30/22 0346 Fri Jul 29, 2022 2039 Discussed risks vs benefits of CTA with patient's wife, who is a pharmacist, including possible significant kidney injury vs missing active bleeding source.  Surgeon Dr. Wellington and I discussed and we both recommend the scan as benefits outweighing risks.  She is amenable to this plan.   [ND]      ED Course User Index  [ND] Luke Omer MD     Medical Decision Making:   Clinical Tests:   Lab Tests: Ordered and  Reviewed  Radiological Study: Ordered and Reviewed           ED Medication(s):  Medications   atorvastatin tablet 10 mg (has no administration in time range)   docusate sodium capsule 100 mg (100 mg Oral Not Given 7/29/22 2332)   finasteride tablet 5 mg (has no administration in time range)   folic acid tablet 1 mg (has no administration in time range)   metoprolol tartrate (LOPRESSOR) tablet 25 mg (0 mg Oral Hold 7/29/22 2333)   midodrine tablet 5 mg (has no administration in time range)   sodium chloride 0.9% flush 10 mL (10 mLs Intravenous Given 7/29/22 2333)   tamsulosin 24 hr capsule 0.4 mg (0 mg Oral Hold 7/29/22 2333)   acetaminophen tablet 650 mg (has no administration in time range)   acetaminophen tablet 650 mg (has no administration in time range)   aluminum-magnesium hydroxide-simethicone 200-200-20 mg/5 mL suspension 30 mL (has no administration in time range)   dextrose 10% bolus 125 mL (has no administration in time range)   dextrose 10% bolus 250 mL (has no administration in time range)   lactulose 20 gram/30 mL solution Soln 20 g (has no administration in time range)   melatonin tablet 6 mg (has no administration in time range)   morphine injection 4 mg (has no administration in time range)   ondansetron disintegrating tablet 8 mg (has no administration in time range)   naloxone 0.4 mg/mL injection 0.02 mg (has no administration in time range)   oxyCODONE immediate release tablet 5 mg (has no administration in time range)   promethazine tablet 25 mg (has no administration in time range)   simethicone chewable tablet 80 mg (has no administration in time range)   sodium chloride 0.9% flush 10 mL (has no administration in time range)   cefTRIAXone (ROCEPHIN) 1 g/50 mL D5W IVPB (0 g Intravenous Stopped 7/30/22 0030)   0.9%  NaCl infusion (has no administration in time range)   lactated ringers bolus 1,000 mL (0 mLs Intravenous Stopped 7/29/22 2317)   iohexoL (OMNIPAQUE 350) injection 100 mL (100 mLs  Intravenous Given 7/29/22 2101)   0.9%  NaCl infusion (1,000 mLs Intravenous New Bag 7/29/22 2323)       New Prescriptions    No medications on file               Scribe Attestation:   Scribe #1: I performed the above scribed service and the documentation accurately describes the services I performed. I attest to the accuracy of the note.     Attending:   Physician Attestation Statement for Scribe #1: I, Luke Omer MD, personally performed the services described in this documentation, as scribed by Anabelle Vallecillo, in my presence, and it is both accurate and complete.       Scribe Attestation:   Scribe #2: I performed the above scribed service and the documentation accurately describes the services I performed. I attest to the accuracy of the note.    Attending Attestation:           Physician Attestation for Scribe:    Physician Attestation Statement for Scribe #2: I, Tamie Smith MD, reviewed documentation, as scribed by Connor Gatica in my presence, and it is both accurate and complete. I also acknowledge and confirm the content of the note done by Scribe #1.           Clinical Impression       ICD-10-CM ICD-9-CM   1. Urinary retention  R33.9 788.20   2. DESEAN (acute kidney injury)  N17.9 584.9   3. Intraabdominal hemorrhage  R58 459.0       Disposition:   Disposition: Placed in Observation  Condition: Serious         Nicho Willett Do, MD  07/30/22 1074

## 2022-07-30 PROBLEM — R93.89 ABNORMAL CT SCAN: Status: ACTIVE | Noted: 2022-07-30

## 2022-07-30 PROBLEM — K59.00 CONSTIPATION: Status: ACTIVE | Noted: 2022-07-30

## 2022-07-30 LAB
ALBUMIN SERPL BCP-MCNC: 2.7 G/DL (ref 3.5–5.2)
ALP SERPL-CCNC: 67 U/L (ref 55–135)
ALT SERPL W/O P-5'-P-CCNC: 9 U/L (ref 10–44)
ANION GAP SERPL CALC-SCNC: 9 MMOL/L (ref 8–16)
ANISOCYTOSIS BLD QL SMEAR: SLIGHT
AST SERPL-CCNC: 15 U/L (ref 10–40)
BASOPHILS # BLD AUTO: 0.06 K/UL (ref 0–0.2)
BASOPHILS NFR BLD: 0.3 % (ref 0–1.9)
BILIRUB SERPL-MCNC: 0.8 MG/DL (ref 0.1–1)
BILIRUB UR QL STRIP: NEGATIVE
BILIRUB UR QL STRIP: NEGATIVE
BUN SERPL-MCNC: 22 MG/DL (ref 8–23)
CALCIUM SERPL-MCNC: 8.8 MG/DL (ref 8.7–10.5)
CHLORIDE SERPL-SCNC: 98 MMOL/L (ref 95–110)
CLARITY UR: CLEAR
CLARITY UR: CLEAR
CO2 SERPL-SCNC: 25 MMOL/L (ref 23–29)
COLOR UR: YELLOW
COLOR UR: YELLOW
CREAT SERPL-MCNC: 1.5 MG/DL (ref 0.5–1.4)
DIFFERENTIAL METHOD: ABNORMAL
EOSINOPHIL # BLD AUTO: 0 K/UL (ref 0–0.5)
EOSINOPHIL NFR BLD: 0.1 % (ref 0–8)
ERYTHROCYTE [DISTWIDTH] IN BLOOD BY AUTOMATED COUNT: 14.4 % (ref 11.5–14.5)
EST. GFR  (AFRICAN AMERICAN): 50 ML/MIN/1.73 M^2
EST. GFR  (NON AFRICAN AMERICAN): 43 ML/MIN/1.73 M^2
GLUCOSE SERPL-MCNC: 114 MG/DL (ref 70–110)
GLUCOSE UR QL STRIP: NEGATIVE
GLUCOSE UR QL STRIP: NEGATIVE
HCT VFR BLD AUTO: 26.7 % (ref 40–54)
HGB BLD-MCNC: 8.9 G/DL (ref 14–18)
HGB UR QL STRIP: ABNORMAL
HGB UR QL STRIP: ABNORMAL
IMM GRANULOCYTES # BLD AUTO: 0.12 K/UL (ref 0–0.04)
IMM GRANULOCYTES NFR BLD AUTO: 0.5 % (ref 0–0.5)
KETONES UR QL STRIP: NEGATIVE
KETONES UR QL STRIP: NEGATIVE
LACTATE SERPL-SCNC: 2.3 MMOL/L (ref 0.5–2.2)
LEUKOCYTE ESTERASE UR QL STRIP: ABNORMAL
LEUKOCYTE ESTERASE UR QL STRIP: ABNORMAL
LYMPHOCYTES # BLD AUTO: 1.4 K/UL (ref 1–4.8)
LYMPHOCYTES NFR BLD: 5.9 % (ref 18–48)
MCH RBC QN AUTO: 29.6 PG (ref 27–31)
MCHC RBC AUTO-ENTMCNC: 33.3 G/DL (ref 32–36)
MCV RBC AUTO: 89 FL (ref 82–98)
MICROSCOPIC COMMENT: NORMAL
MONOCYTES # BLD AUTO: 2.2 K/UL (ref 0.3–1)
MONOCYTES NFR BLD: 9.1 % (ref 4–15)
NEUTROPHILS # BLD AUTO: 20.1 K/UL (ref 1.8–7.7)
NEUTROPHILS NFR BLD: 84.1 % (ref 38–73)
NITRITE UR QL STRIP: NEGATIVE
NITRITE UR QL STRIP: NEGATIVE
NRBC BLD-RTO: 0 /100 WBC
OVALOCYTES BLD QL SMEAR: ABNORMAL
PH UR STRIP: 6 [PH] (ref 5–8)
PH UR STRIP: 6 [PH] (ref 5–8)
PLATELET # BLD AUTO: 252 K/UL (ref 150–450)
PLATELET BLD QL SMEAR: ABNORMAL
PMV BLD AUTO: 10.2 FL (ref 9.2–12.9)
POTASSIUM SERPL-SCNC: 4.5 MMOL/L (ref 3.5–5.1)
PROCALCITONIN SERPL IA-MCNC: 0.27 NG/ML
PROT SERPL-MCNC: 5.3 G/DL (ref 6–8.4)
PROT UR QL STRIP: ABNORMAL
PROT UR QL STRIP: ABNORMAL
RBC # BLD AUTO: 3.01 M/UL (ref 4.6–6.2)
RBC #/AREA URNS HPF: 2 /HPF (ref 0–4)
SODIUM SERPL-SCNC: 132 MMOL/L (ref 136–145)
SP GR UR STRIP: 1.01 (ref 1–1.03)
SP GR UR STRIP: 1.01 (ref 1–1.03)
URN SPEC COLLECT METH UR: ABNORMAL
URN SPEC COLLECT METH UR: ABNORMAL
UROBILINOGEN UR STRIP-ACNC: NEGATIVE EU/DL
UROBILINOGEN UR STRIP-ACNC: NEGATIVE EU/DL
WBC # BLD AUTO: 23.85 K/UL (ref 3.9–12.7)
WBC #/AREA URNS HPF: 1 /HPF (ref 0–5)

## 2022-07-30 PROCEDURE — A4216 STERILE WATER/SALINE, 10 ML: HCPCS | Performed by: NURSE PRACTITIONER

## 2022-07-30 PROCEDURE — 80053 COMPREHEN METABOLIC PANEL: CPT | Performed by: NURSE PRACTITIONER

## 2022-07-30 PROCEDURE — 21400001 HC TELEMETRY ROOM

## 2022-07-30 PROCEDURE — 99222 PR INITIAL HOSPITAL CARE,LEVL II: ICD-10-PCS | Mod: ,,, | Performed by: SURGERY

## 2022-07-30 PROCEDURE — 97530 THERAPEUTIC ACTIVITIES: CPT

## 2022-07-30 PROCEDURE — 99222 1ST HOSP IP/OBS MODERATE 55: CPT | Mod: ,,, | Performed by: SURGERY

## 2022-07-30 PROCEDURE — 99223 1ST HOSP IP/OBS HIGH 75: CPT | Mod: ,,, | Performed by: UROLOGY

## 2022-07-30 PROCEDURE — 25000003 PHARM REV CODE 250: Performed by: NURSE PRACTITIONER

## 2022-07-30 PROCEDURE — 63600175 PHARM REV CODE 636 W HCPCS: Performed by: NURSE PRACTITIONER

## 2022-07-30 PROCEDURE — 99223 PR INITIAL HOSPITAL CARE,LEVL III: ICD-10-PCS | Mod: ,,, | Performed by: UROLOGY

## 2022-07-30 PROCEDURE — 97166 OT EVAL MOD COMPLEX 45 MIN: CPT

## 2022-07-30 PROCEDURE — 97162 PT EVAL MOD COMPLEX 30 MIN: CPT

## 2022-07-30 RX ORDER — PANTOPRAZOLE SODIUM 40 MG/1
40 FOR SUSPENSION ORAL DAILY
COMMUNITY

## 2022-07-30 RX ADMIN — Medication 10 ML: at 06:07

## 2022-07-30 RX ADMIN — CEFTRIAXONE 1 G: 1 INJECTION, SOLUTION INTRAVENOUS at 12:07

## 2022-07-30 RX ADMIN — MIDODRINE HYDROCHLORIDE 5 MG: 5 TABLET ORAL at 05:07

## 2022-07-30 RX ADMIN — DOCUSATE SODIUM 100 MG: 100 CAPSULE, LIQUID FILLED ORAL at 09:07

## 2022-07-30 RX ADMIN — METOPROLOL TARTRATE 25 MG: 25 TABLET, FILM COATED ORAL at 09:07

## 2022-07-30 RX ADMIN — Medication 10 ML: at 05:07

## 2022-07-30 RX ADMIN — SODIUM CHLORIDE: 0.9 INJECTION, SOLUTION INTRAVENOUS at 02:07

## 2022-07-30 RX ADMIN — MIDODRINE HYDROCHLORIDE 5 MG: 5 TABLET ORAL at 07:07

## 2022-07-30 RX ADMIN — MIDODRINE HYDROCHLORIDE 5 MG: 5 TABLET ORAL at 11:07

## 2022-07-30 NOTE — H&P
[facility]  [facility]  History & Physical    Patient Name: Riley Saba  MRN: [id number]  Patient Class: OP- Observation  Admission Date: 7/29/2022  Attending Physician: Perfecto Ramey MD   Primary Care Provider: Bj Clayton MD         Patient information was obtained from spouse/SO and ER records.     Subjective:     Principal Problem:<principal problem not specified>    Chief Complaint:   Chief Complaint   Patient presents with    Urinary Retention     Pt arrives via EMS from [facility] with reports of no urine output x 8 hrs. 20Fr indwelling catheter in place. Pt baseline is GCS 14.         HPI: Riley Saba is a 82 y.o. male patient with a PMHx of HLD, supraventricular tachycardia, and alzheimer's disease who presents to the Emergency Department for evaluation of urinary retention which onset this AM. Pt has had a mendes catheter since June 6th. Pt's family member states there was urine output last night and when the catheter was changed this AM urine output stopped.  Pt drank 2 cups of water last PM and two cups of water this AM. Pt also has a history of prostate issues. No associated sxs reported. Spouse/pt denies: fever, chills, cough, SOB, abd pain, BLE edema, and all other sx at this time.  ED workup shows: WBC 21.9K, H/H 10.5/31.3, Na 132, Creatinine 2.3; CT renal study concerning for hemorrhage. Small bilateral pleural effusions with associated compressive atelectasis. Bladder wall thickening which may relate to infection, L obstruction, or neurogenic bladder. CTA abdomen shows: No evidence of ongoing arterial bleeding most notably within the left upper quadrant there is no evidence of contrast extravasation on arterial or delayed phase of imaging. Dr. Wellington (General surgery) was consulted in the ED, recommends serial h/h. Hold anticoagulation, transfuse as needed. No role for surgical exploration at this time.   He is a full code and his SDM is his wife  He will be kept on OBS  for further evaluation under the care of hospital medicine.         Past Medical History:   Diagnosis Date    Alzheimer's disease, unspecified (CODE)     BPH without obstruction/lower urinary tract symptoms     Constipation     HLD (hyperlipidemia)     Orthostatic hypotension     Supraventricular tachycardia        No past surgical history on file.    Review of patient's allergies indicates:  No Known Allergies    Current Facility-Administered Medications on File Prior to Encounter   Medication    [DISCONTINUED] GENERIC EXTERNAL MEDICATION    [DISCONTINUED] GENERIC EXTERNAL MEDICATION     Current Outpatient Medications on File Prior to Encounter   Medication Sig    apixaban (ELIQUIS) 5 mg Tab Two po twice daily x 3 days, then one po bid thereafter for maintenance blood thinner for blood clots    atorvastatin (LIPITOR) 10 MG tablet Take 10 mg by mouth every evening.    docusate sodium (COLACE) 100 MG capsule Take 100 mg by mouth 2 (two) times daily.    finasteride (PROSCAR) 5 mg tablet Take 5 mg by mouth once daily.    folic acid (FOLVITE) 1 MG tablet Take 1 mg by mouth once daily.    lactulose (CHRONULAC) 10 gram/15 mL solution Take 30 mLs (20 g total) by mouth once daily. for 14 days    magnesium hydroxide 400 mg/5 ml (MILK OF MAGNESIA) 400 mg/5 mL Susp Take 20 mLs by mouth daily as needed.    metoprolol tartrate (LOPRESSOR) 25 MG tablet Take 12.5 mg by mouth 2 (two) times daily.    midodrine (PROAMATINE) 5 MG Tab Take 1 tablet (5 mg total) by mouth 3 (three) times daily with meals.    nystatin (MYCOSTATIN) ointment Apply topically 2 (two) times daily.    polyethylene glycol (GLYCOLAX) 17 gram/dose powder Take 17 g by mouth once daily.    tamsulosin (FLOMAX) 0.4 mg Cap Take 0.4 mg by mouth once daily.     Family History    None       Tobacco Use    Smoking status: Not on file    Smokeless tobacco: Not on file   Substance and Sexual Activity    Alcohol use: Not on file    Drug use: Not on  file    Sexual activity: Not on file     Review of Systems   Constitutional: Negative.    HENT: Negative.     Eyes: Negative.    Respiratory: Negative.     Cardiovascular: Negative.    Gastrointestinal: Negative.    Endocrine: Negative.    Genitourinary:  Positive for decreased urine volume and difficulty urinating.   Musculoskeletal: Negative.    Skin: Negative.    Allergic/Immunologic: Negative.    Neurological: Negative.    Hematological: Negative.    Psychiatric/Behavioral: Negative.     Objective:     Vital Signs (Most Recent):  Temp: 97.5 °F (36.4 °C) (07/29/22 1529)  Pulse: 107 (07/29/22 2114)  Resp: (!) 27 (07/29/22 2114)  BP: 125/62 (07/29/22 2114)  SpO2: 95 % (07/29/22 2114)   Vital Signs (24h Range):  Temp:  [97.5 °F (36.4 °C)] 97.5 °F (36.4 °C)  Pulse:  [104-115] 107  Resp:  [25-31] 27  SpO2:  [95 %-98 %] 95 %  BP: (107-140)/(62-93) 125/62     Weight: 69.9 kg (154 lb)  Body mass index is 21.48 kg/m².    Physical Exam  Vitals and nursing note reviewed.   Constitutional:       Appearance: Normal appearance. He is well-developed. He is ill-appearing.   HENT:      Head: Normocephalic and atraumatic.   Eyes:      Pupils: Pupils are equal, round, and reactive to light.   Cardiovascular:      Rate and Rhythm: Normal rate and regular rhythm.      Pulses: Normal pulses.      Heart sounds: Normal heart sounds.   Pulmonary:      Effort: Pulmonary effort is normal. No tachypnea, accessory muscle usage or respiratory distress.      Breath sounds: Normal breath sounds.   Abdominal:      General: Bowel sounds are normal.      Palpations: Abdomen is soft.      Tenderness: There is no abdominal tenderness.   Genitourinary:     Comments: Gramajo cath inplace   Musculoskeletal:         General: Normal range of motion.      Cervical back: Normal range of motion and neck supple.   Skin:     General: Skin is warm and dry.      Capillary Refill: Capillary refill takes less than 2 seconds.      Coloration: Skin is pale.    Neurological:      Mental Status: He is alert and oriented to person, place, and time.      Deep Tendon Reflexes: Reflexes are normal and symmetric.   Psychiatric:         Speech: Speech normal.         Behavior: Behavior normal.         Thought Content: Thought content normal.         Judgment: Judgment normal.        Significant Labs:   Results for orders placed or performed during the hospital encounter of 07/29/22   CBC auto differential   Result Value Ref Range    WBC 21.97 (H) 3.90 - 12.70 K/uL    RBC 3.54 (L) 4.60 - 6.20 M/uL    Hemoglobin 10.5 (L) 14.0 - 18.0 g/dL    Hematocrit 31.3 (L) 40.0 - 54.0 %    MCV 88 82 - 98 fL    MCH 29.7 27.0 - 31.0 pg    MCHC 33.5 32.0 - 36.0 g/dL    RDW 14.1 11.5 - 14.5 %    Platelets 299 150 - 450 K/uL    MPV 10.2 9.2 - 12.9 fL    Immature Granulocytes 0.6 (H) 0.0 - 0.5 %    Gran # (ANC) 18.3 (H) 1.8 - 7.7 K/uL    Immature Grans (Abs) 0.13 (H) 0.00 - 0.04 K/uL    Lymph # 1.7 1.0 - 4.8 K/uL    Mono # 1.8 (H) 0.3 - 1.0 K/uL    Eos # 0.0 0.0 - 0.5 K/uL    Baso # 0.05 0.00 - 0.20 K/uL    nRBC 0 0 /100 WBC    Gran % 83.3 (H) 38.0 - 73.0 %    Lymph % 7.6 (L) 18.0 - 48.0 %    Mono % 8.3 4.0 - 15.0 %    Eosinophil % 0.0 0.0 - 8.0 %    Basophil % 0.2 0.0 - 1.9 %    Differential Method Automated    Comprehensive metabolic panel   Result Value Ref Range    Sodium 132 (L) 136 - 145 mmol/L    Potassium 5.1 3.5 - 5.1 mmol/L    Chloride 95 95 - 110 mmol/L    CO2 24 23 - 29 mmol/L    Glucose 127 (H) 70 - 110 mg/dL    BUN 20 8 - 23 mg/dL    Creatinine 2.3 (H) 0.5 - 1.4 mg/dL    Calcium 9.8 8.7 - 10.5 mg/dL    Total Protein 6.2 6.0 - 8.4 g/dL    Albumin 3.2 (L) 3.5 - 5.2 g/dL    Total Bilirubin 0.8 0.1 - 1.0 mg/dL    Alkaline Phosphatase 77 55 - 135 U/L    AST 16 10 - 40 U/L    ALT 13 10 - 44 U/L    Anion Gap 13 8 - 16 mmol/L    eGFR if African American 30 (A) >60 mL/min/1.73 m^2    eGFR if non African American 26 (A) >60 mL/min/1.73 m^2   POCT COVID-19 Rapid Screening   Result Value Ref  Range    POC Rapid COVID Negative Negative     Acceptable Yes    Type & Screen   Result Value Ref Range    Group & Rh O POS     Indirect Altagracia NEG         Significant Imaging:   Imaging Results               CTA Abdomen and Pelvis (Final result)  Result time 07/29/22 21:17:11      Final result by Jonah Powers MD (07/29/22 21:17:11)                   Impression:      No evidence of ongoing arterial bleeding most notably within the left upper quadrant there is no evidence of contrast extravasation on arterial or delayed phase of imaging.    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    Exam are proximally stable allowing for differences in contrast administration in comparison to the prior noncontrast exam of the same date.    Details as above.    This report was flagged in Epic as abnormal.    All CT scans at this facility are performed  using dose modulation techniques as appropriate to performed exam including the following:  automated exposure control; adjustment of mA and/or kV according to the patients size (this includes techniques or standardized protocols for targeted exams where dose is matched to indication/reason for exam: i.e. extremities or head);  iterative reconstruction technique.      Electronically signed by: Jonah Powers  Date:    07/29/2022  Time:    21:17               Narrative:    EXAMINATION:  CTA ABDOMEN AND PELVIS    CLINICAL HISTORY:  GI bleed;    TECHNIQUE:  Low dose axial images, sagittal and coronal reformations were obtained from the lung bases to the pubic symphysis.  Contrast was administered.  100 mL Omnipaque 350 IV contrast was administered with images obtained before and after the administration of contrast in a CT angiography protocol.  3D/MIP reformats were generated.    COMPARISON:  Multiple priors.    FINDINGS:  Heart: Normal in size. No pericardial effusion.    Lung Bases: Small bilateral pleural effusions.    Liver:  Normal in size and attenuation, with no focal hepatic lesions.    Gallbladder: Partially filled with sludge.    Bile Ducts: No evidence of dilated ducts.    Pancreas: No mass or peripancreatic fat stranding.    Spleen: Unremarkable.    Adrenals: No thickening or nodularity.    Kidneys/ Ureters: No hydronephrosis.  No obstructive uropathy.  Normal uptake and excretion of contrast.    Bladder: Bladder wall thickening stable from the prior exam.  Gramajo catheter appears in place within the bladder.    Reproductive organs: Moderate prostatomegaly.    GI Tract/Mesentery: No evidence of bowel obstruction or inflammation.  No evidence of ongoing GI bleeding.  Similar appearance of the large bowel.    Peritoneal Space: Stable appearance of the left upper quadrant predominant moderate to large volume of high attenuation free fluid concerning for hemorrhage.  No postcontrast enhancement throughout this area or pooling of contrast on delayed phase of imaging to suggest active bleeding.  Source of bleeding is not identified, and may be remote.  Moderate low-density perihepatic fluid stable.  No free air.    Retroperitoneum: No significant adenopathy.    Abdominal wall: Unremarkable.    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  Additional severe stenosis just distal to the area of poststenotic dilation such as on series 4, image 172.  SMA appears patent.  MIL is patent.    Bones: No acute fracture.  Age expected degenerative changes.                                        CT Renal Stone Study ABD Pelvis WO (Final result)  Result time 07/29/22 17:22:39      Final result by Jonah Powers MD (07/29/22 17:22:39)                   Impression:      Moderate to large volume of left greater than right high density peritoneal fluid concerning for hemorrhage.    Additionally there is a moderate volume of simple perihepatic abdominal free fluid.    Small bilateral pleural effusions with associated  compressive atelectasis.    Moderate to severe dilation of the distal colon and rectum of unclear etiology. Correlation for stricture.    Gramajo catheter in place within the bladder. Bladder wall thickening which may relate to infection, L obstruction, or neurogenic bladder. Correlation is advised.    Findings discussed with Dr. Omer prior to dictation.    This report was flagged in Epic as abnormal.    All CT scans at this facility are performed  using dose modulation techniques as appropriate to performed exam including the following:  automated exposure control; adjustment of mA and/or kV according to the patients size (this includes techniques or standardized protocols for targeted exams where dose is matched to indication/reason for exam: i.e. extremities or head);  iterative reconstruction technique.      Electronically signed by: Jonah Powers  Date:    07/29/2022  Time:    17:22               Narrative:    EXAMINATION:  CT RENAL STONE STUDY ABD PELVIS WO    CLINICAL HISTORY:  anuria;    TECHNIQUE:  Low dose axial images, sagittal and coronal reformations were obtained from the lung bases to the pubic symphysis.  Contrast was not administered.    COMPARISON:  None    FINDINGS:  Heart: Normal in size. No pericardial effusion.    Lung Bases: Small bilateral pleural effusions with associated compressive atelectasis.    Liver: Slightly small size liver.  No focal lesions.    Gallbladder: Gallbladder partially filled with sludge.  No findings to definitively suggest acute cholecystitis.    Bile Ducts: No evidence of dilated ducts.    Pancreas: No mass or peripancreatic fat stranding.    Spleen: Unremarkable.    Adrenals: Unremarkable.    Kidneys/ Ureters: No definite hydronephrosis.  No obstructive uropathy.  No nonobstructive nephrolithiasis.    Bladder: Gramajo catheter in place within the bladder.  Bladder wall thickening which may relate to infection, L obstruction, or neurogenic bladder.  Correlation is  advised.    Reproductive organs: Moderate prostatomegaly.    GI Tract/Mesentery: Moderate to severe dilation of the distal colon and rectum of unclear etiology.  Correlation for stricture.  Otherwise the bowel appears grossly within normal limits.  Appendix is not well delineated.    Peritoneal Space: Moderate to large volume of left greater than right high density peritoneal fluid concerning for hemorrhage.  Additionally there is a moderate volume of simple perihepatic abdominal free fluid.    Retroperitoneum: No significant adenopathy.    Abdominal wall: Unremarkable.    Vasculature: No significant atherosclerosis or aneurysm.    Bones: No acute fracture.  Age expected degenerative change.  No definite fracture.                                       Assessment/Plan:     Abnormal CT scan  CT renal study concerning for hemorrhage.   CTA abdomen shows: No evidence of ongoing arterial bleeding most notably within the left upper quadrant there is no evidence of contrast extravasation on arterial or delayed phase of imaging.   Dr. Wellington (General surgery) was consulted in the ED, recommends serial h/h. Hold anticoagulation, transfuse as needed. No role for surgical exploration at this time.       DESEAN (acute kidney injury)  Patient with acute kidney injury likely due to dehydration vs obstruction  DESEAN is currently worsening. Labs reviewed- Renal function/electrolytes with Estimated Creatinine Clearance: 25.3 mL/min (A) (based on SCr of 2.3 mg/dL (H)). according to latest data. Monitor urine output and serial BMP and adjust therapy as needed. Avoid nephrotoxins and renally dose meds for GFR listed above.   Continuous IVFs   Renal US pending     Urinary retention  Urology consult   Renal US pending       Leukocytosis  Blood and Urine cx   IVFs   CXR and UA pending   IV rocephin empirically   Likely reactive       BPH (benign prostatic hyperplasia)  --has indwelling mendes at home  --continue flomax and proscar        --Urology consult     SVT (supraventricular tachycardia)  --continue BB      Essential hypertension  --continue home meds  --cardiac diet      Alzheimer's dementia  Supportive care       Acute pulmonary embolism  Hold Eliquis due to possible bleed  Resume when appropriate       VTE Risk Mitigation (From admission, onward)         Ordered     IP VTE HIGH RISK PATIENT  Once         07/29/22 2258     Place sequential compression device  Until discontinued         07/29/22 2258     Reason for No Pharmacological VTE Prophylaxis  Once        Question:  Reasons:  Answer:  Active Bleeding    07/29/22 2258     Place sequential compression device  Until discontinued         07/29/22 2258                   Anna Preciado NP  Department of Hospital Medicine   '[facility] - Emergency Dept.

## 2022-07-30 NOTE — ASSESSMENT & PLAN NOTE
Patient with stool burden noted on ct, having bowel movements  Recommend bowel regimen with enema and laxatives as patient requires at home

## 2022-07-30 NOTE — ASSESSMENT & PLAN NOTE
Patient with acute kidney injury likely due to dehydration vs obstruction  DESEAN is currently worsening. Labs reviewed- Renal function/electrolytes with Estimated Creatinine Clearance: 25.3 mL/min (A) (based on SCr of 2.3 mg/dL (H)). according to latest data. Monitor urine output and serial BMP and adjust therapy as needed. Avoid nephrotoxins and renally dose meds for GFR listed above.   Continuous IVFs   Renal US pending

## 2022-07-30 NOTE — ASSESSMENT & PLAN NOTE
CT renal study concerning for hemorrhage.   CTA abdomen shows: No evidence of ongoing arterial bleeding most notably within the left upper quadrant there is no evidence of contrast extravasation on arterial or delayed phase of imaging.   Dr. Wellington (General surgery) was consulted in the ED, recommends serial h/h. Hold anticoagulation, transfuse as needed. No role for surgical exploration at this time.

## 2022-07-30 NOTE — PLAN OF CARE
Patient and wife updated on plan of care. ER report received from [facility]. Instructed patient to use call light for assistance, call light in reach.Hourly rounding performed, bed locked, side rails up, bed alarm on, and in low position. Turns [id number] and waffle overlay applied. Tele #[id number] with movement. Wife at bedside. Education provided, questions answered as of now. Patient remained free from falls during shift. Will continue to monitor per unit practice/policy. Jelena Patton RN.

## 2022-07-30 NOTE — CONSULTS
Chief Complaint: retention    HPI:   Riley Saba is a 82 y.o. male with PMHx of HLD, supraventricular tachycardia, and alzheimer's disease presents for issues with his mendes. His wife provides his history. She notes that the patient has had retention since May of this year, patient saw and underwent greenlight PVP with Dr Rodríguez Camarena in June. Patient's wife states that she did not like the idea of the patient having the laser, she wanted him to have a TURP, but her brother convinced her to let him have the surgery as he had the same one and did well. She notes that 2-3 days after the procedure, patient had significant gross hematuria. This has subsequently ceased, however the patient continues to have retention and is mendes dependent. She states that the mendes was changed earlier yesterday and that the patient had very little UOP after, so she brought him in for evaluation. CT in the ED shows what appears to be moderate to large volume in intraperitoneal hemorrhage as well as significantly dilated colon and rectum. No hydronpehrosis noted, mendes located within the bladder. Urology consulted for further retention recs.      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    Family History:  No family history on file.    Social History:        Review of Systems:  General: No fever, chills  Skin: No rashes  Chest:  Denies cough and sputum production  Heart: Denies chest pain  Resp: Denies dyspnea  Abdomen: Denies diarrhea, abdominal pain, hematemesis, or blood in stool.  Musculoskeletal: No joint stiffness or swelling. Denies back pain.  : see HPI  Neuro: no dizziness or weakness    Allergies:  Patient has no known allergies.    Medications:    Current  Facility-Administered Medications:     0.9%  NaCl infusion, , Intravenous, Continuous, Anna Preciado NP, Held at 07/30/22 0000    acetaminophen tablet 650 mg, 650 mg, Oral, Q8H PRN, Anna Preciado NP    acetaminophen tablet 650 mg, 650 mg, Oral, Q4H PRN, Anna Preciado NP    aluminum-magnesium hydroxide-simethicone 200-200-20 mg/5 mL suspension 30 mL, 30 mL, Oral, QID PRN, Anna Preciado NP    atorvastatin tablet 10 mg, 10 mg, Oral, Daily, Anna Preciado NP    cefTRIAXone (ROCEPHIN) 1 g/50 mL D5W IVPB, 1 g, Intravenous, Q24H, Anna Preciado NP, Stopped at 07/30/22 0030    dextrose 10% bolus 125 mL, 12.5 g, Intravenous, PRN, Anna Preciado NP    dextrose 10% bolus 250 mL, 25 g, Intravenous, PRN, Anna Preciado NP    docusate sodium capsule 100 mg, 100 mg, Oral, BID, Anna Preciado NP    finasteride tablet 5 mg, 5 mg, Oral, Daily, Anna Preciado NP    folic acid tablet 1 mg, 1 mg, Oral, Daily, Anna Preciado NP    lactulose 20 gram/30 mL solution Soln 20 g, 20 g, Oral, Daily PRN, Anna Preciado NP    melatonin tablet 6 mg, 6 mg, Oral, Nightly PRN, Anna Preciado NP    metoprolol tartrate (LOPRESSOR) tablet 25 mg, 25 mg, Oral, BID, Anna Preciado NP    midodrine tablet 5 mg, 5 mg, Oral, TID WM, Anna Preciado NP, 5 mg at 07/30/22 0736    morphine injection 4 mg, 4 mg, Intravenous, Q4H PRN, Anna Preciado NP    naloxone 0.4 mg/mL injection 0.02 mg, 0.02 mg, Intravenous, PRN, Anna Preciado NP    ondansetron disintegrating tablet 8 mg, 8 mg, Oral, Q8H PRN, Anna Preciado NP    oxyCODONE immediate release tablet 5 mg, 5 mg, Oral, Q6H PRN, Anna Preciado, NAFISA    promethazine tablet 25 mg, 25 mg, Oral, Q6H PRN, Anna Preciado, NP    simethicone chewable tablet 80 mg, 1 tablet, Oral, QID PRN, Anna Preciado, NP    sodium chloride 0.9% flush 10 mL, 10 mL, Intravenous, Q8H, Anna Preciado, NAFISA, 10 mL  at 07/30/22 0600    sodium chloride 0.9% flush 10 mL, 10 mL, Intravenous, Q12H PRN, Anna Preciado, NAFISA    tamsulosin 24 hr capsule 0.4 mg, 0.4 mg, Oral, QHS, Anna Preciado, NAFISA    Current Outpatient Medications:     apixaban (ELIQUIS) 5 mg Tab, Two po twice daily x 3 days, then one po bid thereafter for maintenance blood thinner for blood clots, Disp: 66 tablet, Rfl: 0    atorvastatin (LIPITOR) 10 MG tablet, Take 10 mg by mouth every evening., Disp: , Rfl:     docusate sodium (COLACE) 100 MG capsule, Take 100 mg by mouth 2 (two) times daily., Disp: , Rfl:     finasteride (PROSCAR) 5 mg tablet, Take 5 mg by mouth once daily., Disp: , Rfl:     folic acid (FOLVITE) 1 MG tablet, Take 1 mg by mouth once daily., Disp: , Rfl:     lactulose (CHRONULAC) 10 gram/15 mL solution, Take 30 mLs (20 g total) by mouth once daily. for 14 days, Disp: 473 mL, Rfl: 0    magnesium hydroxide 400 mg/5 ml (MILK OF MAGNESIA) 400 mg/5 mL Susp, Take 20 mLs by mouth daily as needed., Disp: , Rfl:     metoprolol tartrate (LOPRESSOR) 25 MG tablet, Take 12.5 mg by mouth 2 (two) times daily., Disp: , Rfl:     midodrine (PROAMATINE) 5 MG Tab, Take 1 tablet (5 mg total) by mouth 3 (three) times daily with meals., Disp: 90 tablet, Rfl: 0    nystatin (MYCOSTATIN) ointment, Apply topically 2 (two) times daily., Disp: , Rfl:     polyethylene glycol (GLYCOLAX) 17 gram/dose powder, Take 17 g by mouth once daily., Disp: , Rfl:     tamsulosin (FLOMAX) 0.4 mg Cap, Take 0.4 mg by mouth once daily., Disp: , Rfl:     Physical Exam:  Vitals:    07/30/22 0800   BP: (!) 101/58   Pulse: 104   Resp: (!) 23   Temp:      Body mass index is 21.48 kg/m².  General: awake, alert, cooperative  Head: NC/AT  Ears: external ears normal  Eyes: sclera normal  Lungs: normal inspiration, NAD  Heart: well-perfused  Abdomen: Soft, NT, ND  : mendes in place draining CYU  Lymphatic: groin nodes negative  Skin: The skin is warm and dry  Ext: No c/c/e.  Neuro:  grossly intact, AOx3    RADIOLOGY:  CT RENAL STONE STUDY ABD PELVIS WO 07/29/2022     CLINICAL HISTORY:  anuria;     TECHNIQUE:  Low dose axial images, sagittal and coronal reformations were obtained from the lung bases to the pubic symphysis.  Contrast was not administered.     COMPARISON:  None     FINDINGS:  Heart: Normal in size. No pericardial effusion.     Lung Bases: Small bilateral pleural effusions with associated compressive atelectasis.     Liver: Slightly small size liver.  No focal lesions.     Gallbladder: Gallbladder partially filled with sludge.  No findings to definitively suggest acute cholecystitis.     Bile Ducts: No evidence of dilated ducts.     Pancreas: No mass or peripancreatic fat stranding.     Spleen: Unremarkable.     Adrenals: Unremarkable.     Kidneys/ Ureters: No definite hydronephrosis.  No obstructive uropathy.  No nonobstructive nephrolithiasis.     Bladder: Gramajo catheter in place within the bladder.  Bladder wall thickening which may relate to infection, L obstruction, or neurogenic bladder.  Correlation is advised.     Reproductive organs: Moderate prostatomegaly.     GI Tract/Mesentery: Moderate to severe dilation of the distal colon and rectum of unclear etiology.  Correlation for stricture.  Otherwise the bowel appears grossly within normal limits.  Appendix is not well delineated.     Peritoneal Space: Moderate to large volume of left greater than right high density peritoneal fluid concerning for hemorrhage.  Additionally there is a moderate volume of simple perihepatic abdominal free fluid.     Retroperitoneum: No significant adenopathy.     Abdominal wall: Unremarkable.     Vasculature: No significant atherosclerosis or aneurysm.     Bones: No acute fracture.  Age expected degenerative change.  No definite fracture.     Impression:     Moderate to large volume of left greater than right high density peritoneal fluid concerning for hemorrhage.     Additionally there is a  moderate volume of simple perihepatic abdominal free fluid.     Small bilateral pleural effusions with associated compressive atelectasis.     Moderate to severe dilation of the distal colon and rectum of unclear etiology. Correlation for stricture.     Gramajo catheter in place within the bladder. Bladder wall thickening which may relate to infection, L obstruction, or neurogenic bladder. Correlation is advised.     Findings discussed with Dr. Omer prior to dictation.     This report was flagged in Epic as abnormal.      CTA ABDOMEN AND PELVIS 07/29/2022     CLINICAL HISTORY:  GI bleed;     TECHNIQUE:  Low dose axial images, sagittal and coronal reformations were obtained from the lung bases to the pubic symphysis.  Contrast was administered.  100 mL Omnipaque 350 IV contrast was administered with images obtained before and after the administration of contrast in a CT angiography protocol.  3D/MIP reformats were generated.     COMPARISON:  Multiple priors.     FINDINGS:  Heart: Normal in size. No pericardial effusion.     Lung Bases: Small bilateral pleural effusions.     Liver: Normal in size and attenuation, with no focal hepatic lesions.     Gallbladder: Partially filled with sludge.     Bile Ducts: No evidence of dilated ducts.     Pancreas: No mass or peripancreatic fat stranding.     Spleen: Unremarkable.     Adrenals: No thickening or nodularity.     Kidneys/ Ureters: No hydronephrosis.  No obstructive uropathy.  Normal uptake and excretion of contrast.     Bladder: Bladder wall thickening stable from the prior exam.  Gramajo catheter appears in place within the bladder.     Reproductive organs: Moderate prostatomegaly.     GI Tract/Mesentery: No evidence of bowel obstruction or inflammation.  No evidence of ongoing GI bleeding.  Similar appearance of the large bowel.     Peritoneal Space: Stable appearance of the left upper quadrant predominant moderate to large volume of high attenuation free fluid  concerning for hemorrhage.  No postcontrast enhancement throughout this area or pooling of contrast on delayed phase of imaging to suggest active bleeding.  Source of bleeding is not identified, and may be remote.  Moderate low-density perihepatic fluid stable.  No free air.     Retroperitoneum: No significant adenopathy.     Abdominal wall: Unremarkable.     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  Additional severe stenosis just distal to the area of poststenotic dilation such as on series 4, image 172.  SMA appears patent.  MIL is patent.     Bones: No acute fracture.  Age expected degenerative changes.     Impression:     No evidence of ongoing arterial bleeding most notably within the left upper quadrant there is no evidence of contrast extravasation on arterial or delayed phase of imaging.     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     Exam are proximally stable allowing for differences in contrast administration in comparison to the prior noncontrast exam of the same date.     Details as above.     This report was flagged in Epic as abnormal.    LABS:  I personally reviewed the following lab values:  Lab Results   Component Value Date    WBC 23.85 (H) 07/29/2022    HGB 8.9 (L) 07/29/2022    HCT 26.7 (L) 07/29/2022     07/29/2022     (L) 07/30/2022    K 4.5 07/30/2022    CL 98 07/30/2022    CREATININE 1.5 (H) 07/30/2022    BUN 22 07/30/2022    CO2 25 07/30/2022    INR 1.1 07/14/2022    ALT 9 (L) 07/30/2022    AST 15 07/30/2022     UA:  Component Ref Range & Units 1 d ago 1 d ago 2 wk ago   Specimen UA  Urine, Catheterized  Urine, Catheterized  Urine, Catheterized    Color, UA Yellow, Straw, Yamilet Yellow  Yellow  Yellow    Appearance, UA Clear Clear  Clear  Clear    pH, UA 5.0 - 8.0 6.0  6.0  7.0    Specific Gravity, UA 1.005 - 1.030 1.010  1.010  1.020    Protein, UA Negative Trace Abnormal   Trace Abnormal  CM  Trace Abnormal   CM    Comment: Recommend a 24 hour urine protein or a urine   protein/creatinine ratio if globulin induced proteinuria is   clinically suspected.    Glucose, UA Negative Negative  Negative  Negative    Ketones, UA Negative Negative  Negative  Negative    Bilirubin (UA) Negative Negative  Negative  Negative    Occult Blood UA Negative 2+ Abnormal   2+ Abnormal   Negative    Nitrite, UA Negative Negative  Negative  Negative    Urobilinogen, UA <2.0 EU/dL Negative  Negative  2.0-3.0 Abnormal     Leukocytes, UA Negative Trace Abnormal   Trace Abnormal   2+ Abnormal         Assessment/Plan:   Riley Saba is a 82 y.o. male with urinary retention s/p greenlight PVP with Dr Camarena.     - continue mendes catheter  - would recommend f/u with Dr Camarena for further discussion/possible urodynamics  - call with questions or concerns    Thank you for allowing me the opportunity to participate in this patient's care.     Perfecto Renteria MD  Urology

## 2022-07-30 NOTE — PT/OT/SLP EVAL
Physical Therapy Evaluation    Patient Name:  Riley Saba   MRN:  [id number]    Recommendations:     Discharge Recommendations:  nursing facility, skilled   Discharge Equipment Recommendations: other (see comments) (TBD at next level of care)   Barriers to discharge: None    Assessment:     Riley Saba is a 82 y.o. male admitted with a medical diagnosis of <principal problem not specified>.  He presents with the following impairments/functional limitations:  impaired cognition, impaired endurance, impaired coordination, impaired functional mobility, impaired self care skills.    Rehab Prognosis: Fair; patient would benefit from acute skilled PT services to address these deficits and reach maximum level of function.    Recent Surgery: * No surgery found *      Plan:     During this hospitalization, patient to be seen 3 x/week to address the identified rehab impairments via gait training, therapeutic activities, therapeutic exercises and progress toward the following goals:    · Plan of Care Expires:  08/13/22    Subjective     Chief Complaint: None  Patient/Family Comments/goals: Spouse would like patient to eventually be able to return home.   Pain/Comfort:  · Pain Rating 1: 0/10    Patients cultural, spiritual, Catholic conflicts given the current situation: no    Living Environment:  Patient was in skilled at [facility]. Patient is a poor historian. Wife at bedside states patient was ambulating 170' with a RW and was requiring some assistance with ADLS while in SNF and was set to DC home soon.   Equipment used at home: walker, rolling.  DME owned (not currently used): none.  Upon discharge, patient will have assistance from unknown.    Objective:     Communicated with WILLIS Palomino prior to session.  Patient found supine with telemetry, pulse ox (continuous)  upon PT entry to room.    General Precautions: Standard,     Orthopedic Precautions: (None)   Braces:    Respiratory Status: Room  air    Exams:  · Cognitive Exam:  Patient is oriented to Person      Therapeutic Activities and Exercises:     Patient found supine in bed with spouse at bedside upon PT entrance into the room. PT provided education on role of PT and POC.    Sup>sit: Max A x2 with constant verbal cues and tactile cues to complete movement. Patient confused and unable to follow commands well.    Seated EOB balance: CGA    STS: Max A x2 with RW. Patient unable to fully stand and presented with a posterior lean, unable to correct with verbal and tactile cues.    Sit>sup: CGA. Patient anxious to get back in bed and did so with CGA.         AM-PAC 6 CLICK MOBILITY  Total Score:9     Patient left supine with all lines intact, call button in reach and wife present.    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    Time Tracking:     PT Received On: 07/30/22  PT Start Time: 1120     PT Stop Time: 1143  PT Total Time (min): 23 min     Billable Minutes: Evaluation 11 and Therapeutic Activity 12      07/30/2022

## 2022-07-30 NOTE — SUBJECTIVE & OBJECTIVE
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    Review of patient's allergies indicates:  No Known Allergies    Current Facility-Administered Medications on File Prior to Encounter   Medication    [DISCONTINUED] GENERIC EXTERNAL MEDICATION    [DISCONTINUED] GENERIC EXTERNAL MEDICATION     Current Outpatient Medications on File Prior to Encounter   Medication Sig    apixaban (ELIQUIS) 5 mg Tab Two po twice daily x 3 days, then one po bid thereafter for maintenance blood thinner for blood clots    atorvastatin (LIPITOR) 10 MG tablet Take 10 mg by mouth every evening.    docusate sodium (COLACE) 100 MG capsule Take 100 mg by mouth 2 (two) times daily.    finasteride (PROSCAR) 5 mg tablet Take 5 mg by mouth once daily.    folic acid (FOLVITE) 1 MG tablet Take 1 mg by mouth once daily.    lactulose (CHRONULAC) 10 gram/15 mL solution Take 30 mLs (20 g total) by mouth once daily. for 14 days    magnesium hydroxide 400 mg/5 ml (MILK OF MAGNESIA) 400 mg/5 mL Susp Take 20 mLs by mouth daily as needed.    metoprolol tartrate (LOPRESSOR) 25 MG tablet Take 12.5 mg by mouth 2 (two) times daily.    midodrine (PROAMATINE) 5 MG Tab Take 1 tablet (5 mg total) by mouth 3 (three) times daily with meals.    nystatin (MYCOSTATIN) ointment Apply topically 2 (two) times daily.    polyethylene glycol (GLYCOLAX) 17 gram/dose powder Take 17 g by mouth once daily.    tamsulosin (FLOMAX) 0.4 mg Cap Take 0.4 mg by mouth once daily.     Family History    None       Tobacco Use    Smoking status: Not on file    Smokeless tobacco: Not on file   Substance and Sexual Activity    Alcohol use: Not on file    Drug use: Not on file    Sexual activity: Not on file     Review of Systems   Constitutional: Negative.    HENT:  Negative.     Eyes: Negative.    Respiratory: Negative.     Cardiovascular: Negative.    Gastrointestinal: Negative.    Endocrine: Negative.    Genitourinary:  Positive for decreased urine volume and difficulty urinating.   Musculoskeletal: Negative.    Skin: Negative.    Allergic/Immunologic: Negative.    Neurological: Negative.    Hematological: Negative.    Psychiatric/Behavioral: Negative.     Objective:     Vital Signs (Most Recent):  Temp: 97.5 °F (36.4 °C) (07/29/22 1529)  Pulse: 107 (07/29/22 2114)  Resp: (!) 27 (07/29/22 2114)  BP: 125/62 (07/29/22 2114)  SpO2: 95 % (07/29/22 2114)   Vital Signs (24h Range):  Temp:  [97.5 °F (36.4 °C)] 97.5 °F (36.4 °C)  Pulse:  [104-115] 107  Resp:  [25-31] 27  SpO2:  [95 %-98 %] 95 %  BP: (107-140)/(62-93) 125/62     Weight: 69.9 kg (154 lb)  Body mass index is 21.48 kg/m².    Physical Exam  Vitals and nursing note reviewed.   Constitutional:       Appearance: Normal appearance. He is well-developed. He is ill-appearing.   HENT:      Head: Normocephalic and atraumatic.   Eyes:      Pupils: Pupils are equal, round, and reactive to light.   Cardiovascular:      Rate and Rhythm: Normal rate and regular rhythm.      Pulses: Normal pulses.      Heart sounds: Normal heart sounds.   Pulmonary:      Effort: Pulmonary effort is normal. No tachypnea, accessory muscle usage or respiratory distress.      Breath sounds: Normal breath sounds.   Abdominal:      General: Bowel sounds are normal.      Palpations: Abdomen is soft.      Tenderness: There is no abdominal tenderness.   Genitourinary:     Comments: Gramajo cath inplace   Musculoskeletal:         General: Normal range of motion.      Cervical back: Normal range of motion and neck supple.   Skin:     General: Skin is warm and dry.      Capillary Refill: Capillary refill takes less than 2 seconds.      Coloration: Skin is pale.   Neurological:      Mental Status: He is alert and oriented to person, place, and time.      Deep Tendon  Reflexes: Reflexes are normal and symmetric.   Psychiatric:         Speech: Speech normal.         Behavior: Behavior normal.         Thought Content: Thought content normal.         Judgment: Judgment normal.        Significant Labs:   Results for orders placed or performed during the hospital encounter of 07/29/22   CBC auto differential   Result Value Ref Range    WBC 21.97 (H) 3.90 - 12.70 K/uL    RBC 3.54 (L) 4.60 - 6.20 M/uL    Hemoglobin 10.5 (L) 14.0 - 18.0 g/dL    Hematocrit 31.3 (L) 40.0 - 54.0 %    MCV 88 82 - 98 fL    MCH 29.7 27.0 - 31.0 pg    MCHC 33.5 32.0 - 36.0 g/dL    RDW 14.1 11.5 - 14.5 %    Platelets 299 150 - 450 K/uL    MPV 10.2 9.2 - 12.9 fL    Immature Granulocytes 0.6 (H) 0.0 - 0.5 %    Gran # (ANC) 18.3 (H) 1.8 - 7.7 K/uL    Immature Grans (Abs) 0.13 (H) 0.00 - 0.04 K/uL    Lymph # 1.7 1.0 - 4.8 K/uL    Mono # 1.8 (H) 0.3 - 1.0 K/uL    Eos # 0.0 0.0 - 0.5 K/uL    Baso # 0.05 0.00 - 0.20 K/uL    nRBC 0 0 /100 WBC    Gran % 83.3 (H) 38.0 - 73.0 %    Lymph % 7.6 (L) 18.0 - 48.0 %    Mono % 8.3 4.0 - 15.0 %    Eosinophil % 0.0 0.0 - 8.0 %    Basophil % 0.2 0.0 - 1.9 %    Differential Method Automated    Comprehensive metabolic panel   Result Value Ref Range    Sodium 132 (L) 136 - 145 mmol/L    Potassium 5.1 3.5 - 5.1 mmol/L    Chloride 95 95 - 110 mmol/L    CO2 24 23 - 29 mmol/L    Glucose 127 (H) 70 - 110 mg/dL    BUN 20 8 - 23 mg/dL    Creatinine 2.3 (H) 0.5 - 1.4 mg/dL    Calcium 9.8 8.7 - 10.5 mg/dL    Total Protein 6.2 6.0 - 8.4 g/dL    Albumin 3.2 (L) 3.5 - 5.2 g/dL    Total Bilirubin 0.8 0.1 - 1.0 mg/dL    Alkaline Phosphatase 77 55 - 135 U/L    AST 16 10 - 40 U/L    ALT 13 10 - 44 U/L    Anion Gap 13 8 - 16 mmol/L    eGFR if African American 30 (A) >60 mL/min/1.73 m^2    eGFR if non African American 26 (A) >60 mL/min/1.73 m^2   POCT COVID-19 Rapid Screening   Result Value Ref Range    POC Rapid COVID Negative Negative     Acceptable Yes    Type & Screen   Result Value  Ref Range    Group & Rh O POS     Indirect Altagracia NEG         Significant Imaging:   Imaging Results               CTA Abdomen and Pelvis (Final result)  Result time 07/29/22 21:17:11      Final result by Jonah Powers MD (07/29/22 21:17:11)                   Impression:      No evidence of ongoing arterial bleeding most notably within the left upper quadrant there is no evidence of contrast extravasation on arterial or delayed phase of imaging.    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    Exam are proximally stable allowing for differences in contrast administration in comparison to the prior noncontrast exam of the same date.    Details as above.    This report was flagged in Epic as abnormal.    All CT scans at this facility are performed  using dose modulation techniques as appropriate to performed exam including the following:  automated exposure control; adjustment of mA and/or kV according to the patients size (this includes techniques or standardized protocols for targeted exams where dose is matched to indication/reason for exam: i.e. extremities or head);  iterative reconstruction technique.      Electronically signed by: Jonah Powers  Date:    07/29/2022  Time:    21:17               Narrative:    EXAMINATION:  CTA ABDOMEN AND PELVIS    CLINICAL HISTORY:  GI bleed;    TECHNIQUE:  Low dose axial images, sagittal and coronal reformations were obtained from the lung bases to the pubic symphysis.  Contrast was administered.  100 mL Omnipaque 350 IV contrast was administered with images obtained before and after the administration of contrast in a CT angiography protocol.  3D/MIP reformats were generated.    COMPARISON:  Multiple priors.    FINDINGS:  Heart: Normal in size. No pericardial effusion.    Lung Bases: Small bilateral pleural effusions.    Liver: Normal in size and attenuation, with no focal hepatic lesions.    Gallbladder: Partially filled with  sludge.    Bile Ducts: No evidence of dilated ducts.    Pancreas: No mass or peripancreatic fat stranding.    Spleen: Unremarkable.    Adrenals: No thickening or nodularity.    Kidneys/ Ureters: No hydronephrosis.  No obstructive uropathy.  Normal uptake and excretion of contrast.    Bladder: Bladder wall thickening stable from the prior exam.  Gramajo catheter appears in place within the bladder.    Reproductive organs: Moderate prostatomegaly.    GI Tract/Mesentery: No evidence of bowel obstruction or inflammation.  No evidence of ongoing GI bleeding.  Similar appearance of the large bowel.    Peritoneal Space: Stable appearance of the left upper quadrant predominant moderate to large volume of high attenuation free fluid concerning for hemorrhage.  No postcontrast enhancement throughout this area or pooling of contrast on delayed phase of imaging to suggest active bleeding.  Source of bleeding is not identified, and may be remote.  Moderate low-density perihepatic fluid stable.  No free air.    Retroperitoneum: No significant adenopathy.    Abdominal wall: Unremarkable.    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  Additional severe stenosis just distal to the area of poststenotic dilation such as on series 4, image 172.  SMA appears patent.  MIL is patent.    Bones: No acute fracture.  Age expected degenerative changes.                                        CT Renal Stone Study ABD Pelvis WO (Final result)  Result time 07/29/22 17:22:39      Final result by Jonah Powers MD (07/29/22 17:22:39)                   Impression:      Moderate to large volume of left greater than right high density peritoneal fluid concerning for hemorrhage.    Additionally there is a moderate volume of simple perihepatic abdominal free fluid.    Small bilateral pleural effusions with associated compressive atelectasis.    Moderate to severe dilation of the distal colon and rectum of unclear etiology.  Correlation for stricture.    Gramajo catheter in place within the bladder. Bladder wall thickening which may relate to infection, L obstruction, or neurogenic bladder. Correlation is advised.    Findings discussed with Dr. Omer prior to dictation.    This report was flagged in Epic as abnormal.    All CT scans at this facility are performed  using dose modulation techniques as appropriate to performed exam including the following:  automated exposure control; adjustment of mA and/or kV according to the patients size (this includes techniques or standardized protocols for targeted exams where dose is matched to indication/reason for exam: i.e. extremities or head);  iterative reconstruction technique.      Electronically signed by: Jonah Powers  Date:    07/29/2022  Time:    17:22               Narrative:    EXAMINATION:  CT RENAL STONE STUDY ABD PELVIS WO    CLINICAL HISTORY:  anuria;    TECHNIQUE:  Low dose axial images, sagittal and coronal reformations were obtained from the lung bases to the pubic symphysis.  Contrast was not administered.    COMPARISON:  None    FINDINGS:  Heart: Normal in size. No pericardial effusion.    Lung Bases: Small bilateral pleural effusions with associated compressive atelectasis.    Liver: Slightly small size liver.  No focal lesions.    Gallbladder: Gallbladder partially filled with sludge.  No findings to definitively suggest acute cholecystitis.    Bile Ducts: No evidence of dilated ducts.    Pancreas: No mass or peripancreatic fat stranding.    Spleen: Unremarkable.    Adrenals: Unremarkable.    Kidneys/ Ureters: No definite hydronephrosis.  No obstructive uropathy.  No nonobstructive nephrolithiasis.    Bladder: Gramajo catheter in place within the bladder.  Bladder wall thickening which may relate to infection, L obstruction, or neurogenic bladder.  Correlation is advised.    Reproductive organs: Moderate prostatomegaly.    GI Tract/Mesentery: Moderate to severe dilation of  the distal colon and rectum of unclear etiology.  Correlation for stricture.  Otherwise the bowel appears grossly within normal limits.  Appendix is not well delineated.    Peritoneal Space: Moderate to large volume of left greater than right high density peritoneal fluid concerning for hemorrhage.  Additionally there is a moderate volume of simple perihepatic abdominal free fluid.    Retroperitoneum: No significant adenopathy.    Abdominal wall: Unremarkable.    Vasculature: No significant atherosclerosis or aneurysm.    Bones: No acute fracture.  Age expected degenerative change.  No definite fracture.

## 2022-07-30 NOTE — SUBJECTIVE & OBJECTIVE
Current Facility-Administered Medications on File Prior to Encounter   Medication    [DISCONTINUED] GENERIC EXTERNAL MEDICATION    [DISCONTINUED] GENERIC EXTERNAL MEDICATION     Current Outpatient Medications on File Prior to Encounter   Medication Sig    apixaban (ELIQUIS) 5 mg Tab Two po twice daily x 3 days, then one po bid thereafter for maintenance blood thinner for blood clots (Patient taking differently: Take 5 mg by mouth 2 (two) times daily.)    atorvastatin (LIPITOR) 10 MG tablet Take 10 mg by mouth every evening.    docusate sodium (COLACE) 100 MG capsule Take 100 mg by mouth 2 (two) times daily.    finasteride (PROSCAR) 5 mg tablet Take 5 mg by mouth once daily.    folic acid (FOLVITE) 1 MG tablet Take 1 mg by mouth once daily.    metoprolol tartrate (LOPRESSOR) 25 MG tablet Take 12.5 mg by mouth 2 (two) times daily.    midodrine (PROAMATINE) 5 MG Tab Take 1 tablet (5 mg total) by mouth 3 (three) times daily with meals.    pantoprazole (PROTONIX) 40 mg suspension Take 40 mg by mouth once daily.    polyethylene glycol (GLYCOLAX) 17 gram/dose powder Take 17 g by mouth once daily.    lactulose (CHRONULAC) 10 gram/15 mL solution Take 30 mLs (20 g total) by mouth once daily. for 14 days    magnesium hydroxide 400 mg/5 ml (MILK OF MAGNESIA) 400 mg/5 mL Susp Take 20 mLs by mouth daily as needed.    [DISCONTINUED] nystatin (MYCOSTATIN) ointment Apply topically 2 (two) times daily.    [DISCONTINUED] tamsulosin (FLOMAX) 0.4 mg Cap Take 0.4 mg by mouth once daily.       Review of patient's allergies indicates:  No Known Allergies    Past Medical History:   Diagnosis Date    Alzheimer's disease, unspecified (CODE)     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  Family History    None       Tobacco Use    Smoking status: Not on file    Smokeless tobacco: Not on file    Substance and Sexual Activity    Alcohol use: Not on file    Drug use: Not on file    Sexual activity: Not on file     Review of Systems   Constitutional:  Positive for activity change and fatigue. Negative for chills, fever and unexpected weight change.   HENT:  Negative for congestion.    Eyes:  Negative for visual disturbance.   Respiratory:  Negative for shortness of breath.    Cardiovascular:  Negative for chest pain.   Gastrointestinal:  Positive for constipation (chronic). Negative for abdominal distention, abdominal pain, nausea, rectal pain and vomiting.   Genitourinary:  Positive for difficulty urinating (chronic/mendes in place). Negative for dysuria.   Musculoskeletal:  Negative for arthralgias.   Skin:  Negative for rash.   Neurological:  Negative for light-headedness.   Hematological:  Negative for adenopathy.   Psychiatric/Behavioral:  Positive for confusion (chronic).    Objective:     Vital Signs (Most Recent):  Temp: 97.5 °F (36.4 °C) (07/29/22 1529)  Pulse: 104 (07/30/22 1259)  Resp: 18 (07/30/22 1259)  BP: 108/65 (07/30/22 1259)  SpO2: 96 % (07/30/22 1259) Vital Signs (24h Range):  Temp:  [97.5 °F (36.4 °C)] 97.5 °F (36.4 °C)  Pulse:  [100-115] 104  Resp:  [18-31] 18  SpO2:  [95 %-98 %] 96 %  BP: ()/(51-93) 108/65     Weight: 69.9 kg (154 lb)  Body mass index is 21.48 kg/m².    Physical Exam  Vitals reviewed.   Constitutional:       Appearance: He is well-developed.      Comments: Frail/elderly/weak   HENT:      Head: Normocephalic and atraumatic.   Cardiovascular:      Rate and Rhythm: Normal rate and regular rhythm.   Pulmonary:      Effort: Pulmonary effort is normal.   Abdominal:      General: There is no distension.      Palpations: Abdomen is soft.      Tenderness: There is no abdominal tenderness.   Musculoskeletal:      Cervical back: Normal range of motion and neck supple.   Skin:     General: Skin is warm and dry.   Neurological:      Mental Status: He is alert.     Significant  Labs:  I have reviewed all pertinent lab results within the past 24 hours.  CBC:   Recent Labs   Lab 07/29/22  2325   WBC 23.85*   RBC 3.01*   HGB 8.9*   HCT 26.7*      MCV 89   MCH 29.6   MCHC 33.3     BMP:   Recent Labs   Lab 07/30/22  0650   *   *   K 4.5   CL 98   CO2 25   BUN 22   CREATININE 1.5*   CALCIUM 8.8       Significant Diagnostics:  CT:  Impression:     Moderate to large volume of left greater than right high density peritoneal fluid concerning for hemorrhage.     Additionally there is a moderate volume of simple perihepatic abdominal free fluid.     Small bilateral pleural effusions with associated compressive atelectasis.     Moderate to severe dilation of the distal colon and rectum of unclear etiology. Correlation for stricture.     Gramajo catheter in place within the bladder. Bladder wall thickening which may relate to infection, L obstruction, or neurogenic bladder. Correlation is advised    CTA:  Impression:     No evidence of ongoing arterial bleeding most notably within the left upper quadrant there is no evidence of contrast extravasation on arterial or delayed phase of imaging.     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     Exam are proximally stable allowing for differences in contrast administration in comparison to the prior noncontrast exam of the same date.

## 2022-07-30 NOTE — PLAN OF CARE
Initial PT eval completed. Patient Max A x2 with sit<>stand transfer and unsafe to attempt ambulation at this time. Recommend patient return to SNF.

## 2022-07-30 NOTE — HPI
83 y/o male referred for intraabdominal hemorrhage. The patient was brought in for decreased uop. HE underwent eval with CT showing concerns for intraabdominal blood. Subsequent cta showed no active arterial bleed. He was recently admitted for PE/DVT and started on anticoagulation, also recent urology procedure a month ago and indwelling catheter remains Denies any abdominal pain. His wife endorses chronic constipation but still having bowel movements with laxatives.

## 2022-07-30 NOTE — PT/OT/SLP EVAL
Occupational Therapy   Evaluation    Name: Riley Saba  MRN: [id number]  Admitting Diagnosis:  <principal problem not specified>  Recent Surgery: * No surgery found *      Recommendations:     Discharge Recommendations: nursing facility, skilled  Discharge Equipment Recommendations:  none  Barriers to discharge:       Assessment:     Riley Saba is a 82 y.o. male with a medical diagnosis of <principal problem not specified>.  He presents with DEBILITY AND GENERALIZED WEAKNESS. Performance deficits affecting function: weakness, impaired functional mobility, gait instability, impaired balance, impaired self care skills, decreased safety awareness, decreased upper extremity function, decreased coordination, decreased lower extremity function.      Rehab Prognosis: Fair; patient would benefit from acute skilled OT services to address these deficits and reach maximum level of function.       Plan:     Patient to be seen   to address the above listed problems via self-care/home management, therapeutic activities, therapeutic exercises  · Plan of Care Expires: 08/13/22  · Plan of Care Reviewed with:      Subjective     Chief Complaint: DEBILITY AND GENERALIZED WEAKNESS  Patient/Family Comments/goals:     Occupational Profile:  Living Environment: LIVES  WITH SPOUSE ; CURRENTLY DISCHARGED FROM SNF  Previous level of function: S WITH ADL'S AND FUNCTIONAL MOBILITY/ TRANSFERS  Roles and Routines: OCCUPATIONAL THERAPY  Equipment Used at Home:  walker, rolling  Assistance upon Discharge:     Pain/Comfort:  · Pain Rating 1: 0/10    Patients cultural, spiritual, Uatsdin conflicts given the current situation:      Objective:     Communicated with: NURSE AND Epic CHART REVIEW prior to session.  Patient found HOB elevated with   upon OT entry to room.    General Precautions: Standard, fall   Orthopedic Precautions:    Braces: N/A  Respiratory Status: Room air    Occupational Performance:    Bed Mobility:    · Patient  completed Rolling/Turning to Left with  maximal assistance and 2 persons  · Patient completed Rolling/Turning to Right with maximal assistance and 2 persons  · Patient completed Scooting/Bridging with maximal assistance and 2 persons  · Patient completed Supine to Sit with maximal assistance and 2 persons  · Patient completed Sit to Supine with minimum assistance    Functional Mobility/Transfers:  · Patient completed Sit <> Stand Transfer with maximal assistance and of 2 persons  with  rolling walker       Activities of Daily Living:  · Lower Body Dressing: maximal assistance .    Cognitive/Visual Perceptual:  Cognitive/Psychosocial Skills:     -       Oriented to: Person   -       Follows Commands/attention:Easily distracted  -       Communication: LOW TONED  -       Memory: Impaired STM, Impaired LTM and Poor immediate recall  -       Safety awareness/insight to disability: impaired     Physical Exam:  Upper Extremity Range of Motion:     -       Right Upper Extremity: WFL  -       Left Upper Extremity: WFL  Upper Extremity Strength:    -       Right Upper Extremity: MMT: 3/5 GROSSLY  -       Left Upper Extremity: MMT: 3/5 GROSSLY   Strength:    -       Right Upper Extremity: MMT: 3/5 GROSSLY  -       Left Upper Extremity: MMT: 3/5 GROSSLY    AMPAC 6 Click ADL:  AMPAC Total Score: 12    Treatment & Education:  Patient educated on role of OT in acute setting and benefits of participation. Educated on techniques to use to increase independence and decrease fall risk with functional transfers. . Functional Mobility: PT SIDE STEP 3 STEP HOB WITH MAX A X2  Encouraged completion of B UE AROM therex throughout the day to tolerance to increase functional strength and activity tolerance. Patient stated understanding and in agreement with POC.    Education:    Patient left HOB elevated with all lines intact, call button in reach, NURSE notified and SPOUSE present    GOALS:   Multidisciplinary Problems     Occupational  Therapy Goals        Problem: Occupational Therapy    Goal Priority Disciplines Outcome Interventions   Occupational Therapy Goal     OT, PT/OT     Description: O.T. GOALS TO BE MET BY 8-13-22  PT WILL TOLERATE 1 SET X 15 REPS A/AAROM EXERCISE  MIN A WITH BSC TRANSFERS  MIN A WITH SUPINE<>SIT TRANSFERS AND MAINTAIN FAIR+ STATIC SITTING BALANCE EOB                          History:     Past Medical History:   Diagnosis Date    Alzheimer's disease, unspecified (CODE)     BPH without obstruction/lower urinary tract symptoms     Constipation     HLD (hyperlipidemia)     Orthostatic hypotension     Supraventricular tachycardia        No past surgical history on file.    Time Tracking:     OT Date of Treatment: 07/30/22  OT Start Time: 1115  OT Stop Time: 1145  OT Total Time (min): 30 min    Billable Minutes:Evaluation 15 MINUTES  Therapeutic Activity 15 MINUTES    7/30/2022

## 2022-07-30 NOTE — ASSESSMENT & PLAN NOTE
Concerns for intraperitoneal hemorrhage, no active bleeding noted on CTA  Serial h/h  Hold anticoagulation  Transfuse prbcs as needed  Expect some decrease with hydration  No surgical intervention at this time unless patient were to continue to have ongoing bleeding that failed to improve with holding anticoagulation or IR embolization  Call with questions

## 2022-07-30 NOTE — CONSULTS
[facility] - Emergency Dept.  General Surgery  Consult Note    Patient Name: Riley Saba  MRN: [id number]  Code Status: Full Code  Admission Date: 7/29/2022  Hospital Length of Stay: 0 days  Attending Physician: Katie Harris MD  Primary Care Provider: Bj Clayton MD    Patient information was obtained from patient and spouse/SO.     Consults  Subjective:     Principal Problem: <principal problem not specified>    History of Present Illness: 83 y/o male referred for intraabdominal hemorrhage. The patient was brought in for decreased uop. HE underwent eval with CT showing concerns for intraabdominal blood. Subsequent cta showed no active arterial bleed. He was recently admitted for PE/DVT and started on anticoagulation, also recent urology procedure a month ago and indwelling catheter remains Denies any abdominal pain. His wife endorses chronic constipation but still having bowel movements with laxatives.      Current Facility-Administered Medications on File Prior to Encounter   Medication    [DISCONTINUED] GENERIC EXTERNAL MEDICATION    [DISCONTINUED] GENERIC EXTERNAL MEDICATION     Current Outpatient Medications on File Prior to Encounter   Medication Sig    apixaban (ELIQUIS) 5 mg Tab Two po twice daily x 3 days, then one po bid thereafter for maintenance blood thinner for blood clots (Patient taking differently: Take 5 mg by mouth 2 (two) times daily.)    atorvastatin (LIPITOR) 10 MG tablet Take 10 mg by mouth every evening.    docusate sodium (COLACE) 100 MG capsule Take 100 mg by mouth 2 (two) times daily.    finasteride (PROSCAR) 5 mg tablet Take 5 mg by mouth once daily.    folic acid (FOLVITE) 1 MG tablet Take 1 mg by mouth once daily.    metoprolol tartrate (LOPRESSOR) 25 MG tablet Take 12.5 mg by mouth 2 (two) times daily.    midodrine (PROAMATINE) 5 MG Tab Take 1 tablet (5 mg total) by mouth 3 (three) times daily with meals.    pantoprazole (PROTONIX) 40 mg suspension Take 40 mg by  mouth once daily.    polyethylene glycol (GLYCOLAX) 17 gram/dose powder Take 17 g by mouth once daily.    lactulose (CHRONULAC) 10 gram/15 mL solution Take 30 mLs (20 g total) by mouth once daily. for 14 days    magnesium hydroxide 400 mg/5 ml (MILK OF MAGNESIA) 400 mg/5 mL Susp Take 20 mLs by mouth daily as needed.    [DISCONTINUED] nystatin (MYCOSTATIN) ointment Apply topically 2 (two) times daily.    [DISCONTINUED] tamsulosin (FLOMAX) 0.4 mg Cap Take 0.4 mg by mouth once daily.       Review of patient's allergies indicates:  No Known Allergies    Past Medical History:   Diagnosis Date    Alzheimer's disease, unspecified (CODE)     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  Family History    None       Tobacco Use    Smoking status: Not on file    Smokeless tobacco: Not on file   Substance and Sexual Activity    Alcohol use: Not on file    Drug use: Not on file    Sexual activity: Not on file     Review of Systems   Constitutional:  Positive for activity change and fatigue. Negative for chills, fever and unexpected weight change.   HENT:  Negative for congestion.    Eyes:  Negative for visual disturbance.   Respiratory:  Negative for shortness of breath.    Cardiovascular:  Negative for chest pain.   Gastrointestinal:  Positive for constipation (chronic). Negative for abdominal distention, abdominal pain, nausea, rectal pain and vomiting.   Genitourinary:  Positive for difficulty urinating (chronic/mendes in place). Negative for dysuria.   Musculoskeletal:  Negative for arthralgias.   Skin:  Negative for rash.   Neurological:  Negative for light-headedness.   Hematological:  Negative for adenopathy.   Psychiatric/Behavioral:  Positive for confusion (chronic).    Objective:     Vital Signs (Most Recent):  Temp: 97.5 °F (36.4 °C) (07/29/22  1529)  Pulse: 104 (07/30/22 1259)  Resp: 18 (07/30/22 1259)  BP: 108/65 (07/30/22 1259)  SpO2: 96 % (07/30/22 1259) Vital Signs (24h Range):  Temp:  [97.5 °F (36.4 °C)] 97.5 °F (36.4 °C)  Pulse:  [100-115] 104  Resp:  [18-31] 18  SpO2:  [95 %-98 %] 96 %  BP: ()/(51-93) 108/65     Weight: 69.9 kg (154 lb)  Body mass index is 21.48 kg/m².    Physical Exam  Vitals reviewed.   Constitutional:       Appearance: He is well-developed.      Comments: Frail/elderly/weak   HENT:      Head: Normocephalic and atraumatic.   Cardiovascular:      Rate and Rhythm: Normal rate and regular rhythm.   Pulmonary:      Effort: Pulmonary effort is normal.   Abdominal:      General: There is no distension.      Palpations: Abdomen is soft.      Tenderness: There is no abdominal tenderness.   Musculoskeletal:      Cervical back: Normal range of motion and neck supple.   Skin:     General: Skin is warm and dry.   Neurological:      Mental Status: He is alert.     Significant Labs:  I have reviewed all pertinent lab results within the past 24 hours.  CBC:   Recent Labs   Lab 07/29/22  2325   WBC 23.85*   RBC 3.01*   HGB 8.9*   HCT 26.7*      MCV 89   MCH 29.6   MCHC 33.3     BMP:   Recent Labs   Lab 07/30/22  0650   *   *   K 4.5   CL 98   CO2 25   BUN 22   CREATININE 1.5*   CALCIUM 8.8       Significant Diagnostics:  CT:  Impression:     Moderate to large volume of left greater than right high density peritoneal fluid concerning for hemorrhage.     Additionally there is a moderate volume of simple perihepatic abdominal free fluid.     Small bilateral pleural effusions with associated compressive atelectasis.     Moderate to severe dilation of the distal colon and rectum of unclear etiology. Correlation for stricture.     Gramajo catheter in place within the bladder. Bladder wall thickening which may relate to infection, L obstruction, or neurogenic bladder. Correlation is advised    CTA:  Impression:     No evidence  of ongoing arterial bleeding most notably within the left upper quadrant there is no evidence of contrast extravasation on arterial or delayed phase of imaging.     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     Exam are proximally stable allowing for differences in contrast administration in comparison to the prior noncontrast exam of the same date.      Assessment/Plan:     Constipation  Patient with stool burden noted on ct, having bowel movements  Recommend bowel regimen with enema and laxatives as patient requires at home    Abnormal CT scan  Concerns for intraperitoneal hemorrhage, no active bleeding noted on CTA  Serial h/h  Hold anticoagulation  Transfuse prbcs as needed  Expect some decrease with hydration  No surgical intervention at this time unless patient were to continue to have ongoing bleeding that failed to improve with holding anticoagulation or IR embolization  Call with questions    DESEAN (acute kidney injury)  Hydration  Monitor bun/cr/uop    Urinary retention  Urology following    Essential hypertension  Medical mgmt    Acute pulmonary embolism  Medical mgmt    VTE Risk Mitigation (From admission, onward)         Ordered     IP VTE HIGH RISK PATIENT  Once         07/29/22 2258     Place sequential compression device  Until discontinued         07/29/22 2258     Reason for No Pharmacological VTE Prophylaxis  Once        Question:  Reasons:  Answer:  Active Bleeding    07/29/22 2258     Place sequential compression device  Until discontinued         07/29/22 2258                Thank you for your consult. I will follow-up with patient. Please contact us if you have any additional questions.    Rafat Wellington MD  General Surgery  [facility] - Emergency Dept.

## 2022-07-30 NOTE — PHARMACY MED REC
"Admission Medication History     The home medication history was taken by Steve Pino.    You may go to "Admission" then "Reconcile Home Medications" tabs to review and/or act upon these items.      The home medication list has been updated by the Pharmacy department.    Please read ALL comments highlighted in yellow.    Please address this information as you see fit.     Feel free to contact us if you have any questions or require assistance.      The medications listed below were removed from the home medication list. Please reorder if appropriate:  Patient reports no longer taking the following medication(s):   DONEPEZIL 10 MG TABLET   NYSTATIN TOPICAL OINTMENT   TAMSULOSIN 0.4 MG CAPSULE    Medications listed below were obtained from: Patient/family, [employer] and Patient's pharmacy  (Not in a hospital admission)      Potential issues to be addressed PRIOR TO DISCHARGE: NONE    Steve Pino, [facility]  Pharmacy Technician Specialist-Medication History  [facility] [phone]  Secure chat preferred     Current Outpatient Medications on File Prior to Encounter   Medication Sig Dispense Refill Last Dose    apixaban (ELIQUIS) 5 mg Tab Two po twice daily x 3 days, then one po bid thereafter for maintenance blood thinner for blood clots (Patient taking differently: Take 5 mg by mouth 2 (two) times daily.) 66 tablet 0 7/29/2022 at Unknown time    atorvastatin (LIPITOR) 10 MG tablet Take 10 mg by mouth every evening.   7/29/2022 at Unknown time    docusate sodium (COLACE) 100 MG capsule Take 100 mg by mouth 2 (two) times daily.   7/29/2022 at Unknown time    finasteride (PROSCAR) 5 mg tablet Take 5 mg by mouth once daily.   7/29/2022 at Unknown time    folic acid (FOLVITE) 1 MG tablet Take 1 mg by mouth once daily.   7/29/2022 at Unknown time    metoprolol tartrate (LOPRESSOR) 25 MG tablet Take 12.5 mg by mouth 2 (two) times daily.   7/29/2022 at Unknown time    midodrine (PROAMATINE) 5 MG " Tab Take 1 tablet (5 mg total) by mouth 3 (three) times daily with meals. 90 tablet 0 7/30/2022 at Unknown time    pantoprazole (PROTONIX) 40 mg suspension Take 40 mg by mouth once daily.   7/29/2022 at Unknown time    polyethylene glycol (GLYCOLAX) 17 gram/dose powder Take 17 g by mouth once daily.   7/29/2022 at Unknown time    lactulose (CHRONULAC) 10 gram/15 mL solution Take 30 mLs (20 g total) by mouth once daily. for 14 days 473 mL 0 Unknown at Unknown time    magnesium hydroxide 400 mg/5 ml (MILK OF MAGNESIA) 400 mg/5 mL Susp Take 20 mLs by mouth daily as needed.   Unknown at Unknown time    [DISCONTINUED] nystatin (MYCOSTATIN) ointment Apply topically 2 (two) times daily.       [DISCONTINUED] tamsulosin (FLOMAX) 0.4 mg Cap Take 0.4 mg by mouth once daily.                                .

## 2022-07-30 NOTE — PLAN OF CARE
SEE EVAL FOR DETAILS.. PT DISPLAYED DEFICITS WITH [employer] FUNCTIONAL MOBILITY/ TRANSFER AS WELL AS DECREASE B UE STRENGTH/ENDURANCE. RECOMMEND: SNF

## 2022-07-30 NOTE — HPI
Riley Saba is a 82 y.o. male patient with a PMHx of HLD, supraventricular tachycardia, and alzheimer's disease who presents to the Emergency Department for evaluation of urinary retention which onset this AM. Pt has had a mendes catheter since June 6th. Pt's family member states there was urine output last night and when the catheter was changed this AM urine output stopped.  Pt drank 2 cups of water last PM and two cups of water this AM. Pt also has a history of prostate issues. No associated sxs reported. Spouse/pt denies: fever, chills, cough, SOB, abd pain, BLE edema, and all other sx at this time.  ED workup shows: WBC 21.9K, H/H 10.5/31.3, Na 132, Creatinine 2.3; CT renal study concerning for hemorrhage. Small bilateral pleural effusions with associated compressive atelectasis. Bladder wall thickening which may relate to infection, L obstruction, or neurogenic bladder. CTA abdomen shows: No evidence of ongoing arterial bleeding most notably within the left upper quadrant there is no evidence of contrast extravasation on arterial or delayed phase of imaging. Dr. Wellington (General surgery) was consulted in the ED, recommends serial h/h. Hold anticoagulation, transfuse as needed. No role for surgical exploration at this time.   He is a full code and his SDM is his wife  He will be kept on OBS for further evaluation under the care of hospital medicine.

## 2022-07-31 PROBLEM — R58 INTRAABDOMINAL HEMORRHAGE: Status: RESOLVED | Noted: 2022-07-31 | Resolved: 2022-07-31

## 2022-07-31 PROBLEM — R58 INTRAABDOMINAL HEMORRHAGE: Status: ACTIVE | Noted: 2022-07-31

## 2022-07-31 PROBLEM — R93.89 ABNORMAL CT SCAN: Status: RESOLVED | Noted: 2022-07-30 | Resolved: 2022-07-31

## 2022-07-31 LAB
ALBUMIN SERPL BCP-MCNC: 2.5 G/DL (ref 3.5–5.2)
ALP SERPL-CCNC: 63 U/L (ref 55–135)
ALT SERPL W/O P-5'-P-CCNC: 9 U/L (ref 10–44)
ANION GAP SERPL CALC-SCNC: 8 MMOL/L (ref 8–16)
AST SERPL-CCNC: 13 U/L (ref 10–40)
BASOPHILS # BLD AUTO: 0.09 K/UL (ref 0–0.2)
BASOPHILS NFR BLD: 0.5 % (ref 0–1.9)
BILIRUB SERPL-MCNC: 0.7 MG/DL (ref 0.1–1)
BUN SERPL-MCNC: 19 MG/DL (ref 8–23)
CALCIUM SERPL-MCNC: 8.2 MG/DL (ref 8.7–10.5)
CHLORIDE SERPL-SCNC: 100 MMOL/L (ref 95–110)
CO2 SERPL-SCNC: 25 MMOL/L (ref 23–29)
CREAT SERPL-MCNC: 0.7 MG/DL (ref 0.5–1.4)
DIFFERENTIAL METHOD: ABNORMAL
EOSINOPHIL # BLD AUTO: 0.5 K/UL (ref 0–0.5)
EOSINOPHIL NFR BLD: 2.7 % (ref 0–8)
ERYTHROCYTE [DISTWIDTH] IN BLOOD BY AUTOMATED COUNT: 14.5 % (ref 11.5–14.5)
EST. GFR  (AFRICAN AMERICAN): >60 ML/MIN/1.73 M^2
EST. GFR  (NON AFRICAN AMERICAN): >60 ML/MIN/1.73 M^2
GLUCOSE SERPL-MCNC: 76 MG/DL (ref 70–110)
HCT VFR BLD AUTO: 22.5 % (ref 40–54)
HGB BLD-MCNC: 7.2 G/DL (ref 14–18)
IMM GRANULOCYTES # BLD AUTO: 0.08 K/UL (ref 0–0.04)
IMM GRANULOCYTES NFR BLD AUTO: 0.5 % (ref 0–0.5)
LYMPHOCYTES # BLD AUTO: 0.8 K/UL (ref 1–4.8)
LYMPHOCYTES NFR BLD: 4.7 % (ref 18–48)
MCH RBC QN AUTO: 29.1 PG (ref 27–31)
MCHC RBC AUTO-ENTMCNC: 32 G/DL (ref 32–36)
MCV RBC AUTO: 91 FL (ref 82–98)
MONOCYTES # BLD AUTO: 1.2 K/UL (ref 0.3–1)
MONOCYTES NFR BLD: 6.9 % (ref 4–15)
NEUTROPHILS # BLD AUTO: 14.4 K/UL (ref 1.8–7.7)
NEUTROPHILS NFR BLD: 84.7 % (ref 38–73)
NRBC BLD-RTO: 0 /100 WBC
PLATELET # BLD AUTO: 194 K/UL (ref 150–450)
PMV BLD AUTO: 10.8 FL (ref 9.2–12.9)
POTASSIUM SERPL-SCNC: 3.9 MMOL/L (ref 3.5–5.1)
PROT SERPL-MCNC: 4.6 G/DL (ref 6–8.4)
RBC # BLD AUTO: 2.47 M/UL (ref 4.6–6.2)
SODIUM SERPL-SCNC: 133 MMOL/L (ref 136–145)
WBC # BLD AUTO: 16.96 K/UL (ref 3.9–12.7)

## 2022-07-31 PROCEDURE — 80053 COMPREHEN METABOLIC PANEL: CPT | Performed by: NURSE PRACTITIONER

## 2022-07-31 PROCEDURE — 99231 SBSQ HOSP IP/OBS SF/LOW 25: CPT | Mod: ,,, | Performed by: SURGERY

## 2022-07-31 PROCEDURE — 36415 COLL VENOUS BLD VENIPUNCTURE: CPT | Performed by: STUDENT IN AN ORGANIZED HEALTH CARE EDUCATION/TRAINING PROGRAM

## 2022-07-31 PROCEDURE — 92610 EVALUATE SWALLOWING FUNCTION: CPT

## 2022-07-31 PROCEDURE — 85025 COMPLETE CBC W/AUTO DIFF WBC: CPT | Performed by: STUDENT IN AN ORGANIZED HEALTH CARE EDUCATION/TRAINING PROGRAM

## 2022-07-31 PROCEDURE — 25000003 PHARM REV CODE 250: Performed by: STUDENT IN AN ORGANIZED HEALTH CARE EDUCATION/TRAINING PROGRAM

## 2022-07-31 PROCEDURE — A4216 STERILE WATER/SALINE, 10 ML: HCPCS | Performed by: NURSE PRACTITIONER

## 2022-07-31 PROCEDURE — 63600175 PHARM REV CODE 636 W HCPCS: Performed by: NURSE PRACTITIONER

## 2022-07-31 PROCEDURE — 21400001 HC TELEMETRY ROOM

## 2022-07-31 PROCEDURE — 25000003 PHARM REV CODE 250: Performed by: NURSE PRACTITIONER

## 2022-07-31 PROCEDURE — 99231 PR SUBSEQUENT HOSPITAL CARE,LEVL I: ICD-10-PCS | Mod: ,,, | Performed by: SURGERY

## 2022-07-31 RX ORDER — POLYETHYLENE GLYCOL 3350 17 G/17G
17 POWDER, FOR SOLUTION ORAL DAILY
Status: DISCONTINUED | OUTPATIENT
Start: 2022-07-31 | End: 2022-08-03 | Stop reason: HOSPADM

## 2022-07-31 RX ADMIN — Medication 10 ML: at 09:07

## 2022-07-31 RX ADMIN — FOLIC ACID 1 MG: 1 TABLET ORAL at 09:07

## 2022-07-31 RX ADMIN — MIDODRINE HYDROCHLORIDE 5 MG: 5 TABLET ORAL at 09:07

## 2022-07-31 RX ADMIN — METOPROLOL TARTRATE 25 MG: 25 TABLET, FILM COATED ORAL at 08:07

## 2022-07-31 RX ADMIN — FINASTERIDE 5 MG: 5 TABLET, FILM COATED ORAL at 09:07

## 2022-07-31 RX ADMIN — DOCUSATE SODIUM 100 MG: 100 CAPSULE, LIQUID FILLED ORAL at 09:07

## 2022-07-31 RX ADMIN — METOPROLOL TARTRATE 25 MG: 25 TABLET, FILM COATED ORAL at 09:07

## 2022-07-31 RX ADMIN — CEFTRIAXONE 1 G: 1 INJECTION, SOLUTION INTRAVENOUS at 12:07

## 2022-07-31 RX ADMIN — SODIUM CHLORIDE: 0.9 INJECTION, SOLUTION INTRAVENOUS at 09:07

## 2022-07-31 RX ADMIN — ATORVASTATIN CALCIUM 10 MG: 10 TABLET, FILM COATED ORAL at 09:07

## 2022-07-31 RX ADMIN — POLYETHYLENE GLYCOL 3350 17 G: 17 POWDER, FOR SOLUTION ORAL at 12:07

## 2022-07-31 RX ADMIN — MIDODRINE HYDROCHLORIDE 5 MG: 5 TABLET ORAL at 06:07

## 2022-07-31 RX ADMIN — SODIUM CHLORIDE: 0.9 INJECTION, SOLUTION INTRAVENOUS at 12:07

## 2022-07-31 RX ADMIN — MIDODRINE HYDROCHLORIDE 5 MG: 5 TABLET ORAL at 12:07

## 2022-07-31 NOTE — ASSESSMENT & PLAN NOTE
Supportive care   Altered mental status on admission improved to baseline  ST to eval and treat, f/u recs modified diet

## 2022-07-31 NOTE — ASSESSMENT & PLAN NOTE
CT renal study concerning for hemorrhage.   CTA abdomen shows: No evidence of ongoing arterial bleeding most notably within the left upper quadrant there is no evidence of contrast extravasation on arterial or delayed phase of imaging.   Dr. Wellington (General surgery) was consulted in the ED, recommends serial h/h. Hold anticoagulation, transfuse as needed. No role for surgical exploration at this time.     Transfuse Hb<7

## 2022-07-31 NOTE — ASSESSMENT & PLAN NOTE
Blood and Urine cx   IVFs   CXR and UA pending   IV rocephin empirically   Likely reactive     Trending down

## 2022-07-31 NOTE — PROGRESS NOTES
[facility] Medicine  Progress Note    Patient Name: Riley Saba  MRN: [id number]  Patient Class: IP- Inpatient   Admission Date: 7/29/2022  Length of Stay: 1 days  Attending Physician: Katie Harris MD  Primary Care Provider: Bj Clayton MD        Subjective:     Principal Problem:Intraabdominal hemorrhage        HPI:  Riley Saba is a 82 y.o. male patient with a PMHx of HLD, supraventricular tachycardia, and alzheimer's disease who presents to the Emergency Department for evaluation of urinary retention which onset this AM. Pt has had a mendes catheter since June 6th. Pt's family member states there was urine output last night and when the catheter was changed this AM urine output stopped.  Pt drank 2 cups of water last PM and two cups of water this AM. Pt also has a history of prostate issues. No associated sxs reported. Spouse/pt denies: fever, chills, cough, SOB, abd pain, BLE edema, and all other sx at this time.  ED workup shows: WBC 21.9K, H/H 10.5/31.3, Na 132, Creatinine 2.3; CT renal study concerning for hemorrhage. Small bilateral pleural effusions with associated compressive atelectasis. Bladder wall thickening which may relate to infection, L obstruction, or neurogenic bladder. CTA abdomen shows: No evidence of ongoing arterial bleeding most notably within the left upper quadrant there is no evidence of contrast extravasation on arterial or delayed phase of imaging. Dr. Wellington (General surgery) was consulted in the ED, recommends serial h/h. Hold anticoagulation, transfuse as needed. No role for surgical exploration at this time.   He is a full code and his SDM is his wife  He will be kept on OBS for further evaluation under the care of [facility] medicine.         Overview/Hospital Course:  Admited for intraabdominal hemorrhage per CT. Subsequent cta showed no active arterial bleed. He was recently admitted for PE/DVT and started on anticoagulation, held since  admission. Continue to monitor Hb, mentation improving. Cr to baseline.      Review of Systems   Constitutional:  Positive for activity change and fatigue. Negative for chills, fever and unexpected weight change.   HENT:  Negative for congestion.    Eyes:  Negative for visual disturbance.   Respiratory:  Negative for shortness of breath.    Cardiovascular:  Negative for chest pain.   Gastrointestinal:  Positive for constipation (chronic). Negative for abdominal distention, abdominal pain, nausea, rectal pain and vomiting.   Genitourinary:  Positive for difficulty urinating (chronic/mendes in place). Negative for dysuria.   Musculoskeletal:  Negative for arthralgias.   Skin:  Negative for rash.   Neurological:  Negative for light-headedness.   Hematological:  Negative for adenopathy.   Psychiatric/Behavioral:  Positive for confusion (chronic).    Objective:     Vital Signs (Most Recent):  Temp: 98.1 °F (36.7 °C) (07/31/22 0400)  Pulse: 99 (07/31/22 0400)  Resp: 18 (07/31/22 0400)  BP: 136/68 (07/31/22 0400)  SpO2: 95 % (07/31/22 0400) Vital Signs (24h Range):  Temp:  [98.1 °F (36.7 °C)-99.5 °F (37.5 °C)] 98.1 °F (36.7 °C)  Pulse:  [] 99  Resp:  [16-21] 18  SpO2:  [93 %-98 %] 95 %  BP: (108-136)/(59-68) 136/68     Weight: 68.3 kg (150 lb 9.2 oz)  Body mass index is 21 kg/m².    Intake/Output Summary (Last 24 hours) at 7/31/2022 1222  Last data filed at 7/31/2022 0404  Gross per 24 hour   Intake --   Output 870 ml   Net -870 ml      Physical Exam  Vitals and nursing note reviewed.   Constitutional:       Appearance: Normal appearance. He is well-developed. He is ill-appearing.      Comments: Frail/elderly/weak   HENT:      Head: Normocephalic and atraumatic.   Eyes:      Pupils: Pupils are equal, round, and reactive to light.   Cardiovascular:      Rate and Rhythm: Normal rate and regular rhythm.      Pulses: Normal pulses.      Heart sounds: Normal heart sounds.   Pulmonary:      Effort: Pulmonary effort is  normal. No tachypnea, accessory muscle usage or respiratory distress.      Breath sounds: Normal breath sounds.   Abdominal:      General: Bowel sounds are normal. There is no distension.      Palpations: Abdomen is soft.      Tenderness: There is no abdominal tenderness.   Genitourinary:     Comments: Gramajo cath inplace   Musculoskeletal:         General: Normal range of motion.      Cervical back: Normal range of motion and neck supple.   Skin:     General: Skin is warm and dry.      Capillary Refill: Capillary refill takes less than 2 seconds.      Coloration: Skin is pale.   Neurological:      Mental Status: He is alert and oriented to person, place, and time.      Deep Tendon Reflexes: Reflexes are normal and symmetric.   Psychiatric:         Speech: Speech normal.         Behavior: Behavior normal.         Thought Content: Thought content normal.         Judgment: Judgment normal.       Significant Labs: All pertinent labs within the past 24 hours have been reviewed.    Significant Imaging: I have reviewed all pertinent imaging results/findings within the past 24 hours.      Assessment/Plan:      * Intraabdominal hemorrhage  CT renal study concerning for hemorrhage.   CTA abdomen shows: No evidence of ongoing arterial bleeding most notably within the left upper quadrant there is no evidence of contrast extravasation on arterial or delayed phase of imaging.   Dr. Wellington (General surgery) was consulted in the ED, recommends serial h/h. Hold anticoagulation, transfuse as needed. No role for surgical exploration at this time.     Transfuse Hb<7      DESEAN (acute kidney injury)  Patient with acute kidney injury likely due to dehydration vs obstruction  DESEAN is currently worsening. Labs reviewed- Renal function/electrolytes with Estimated Creatinine Clearance: 81.3 mL/min (based on SCr of 0.7 mg/dL). according to latest data. Monitor urine output and serial BMP and adjust therapy as needed. Avoid nephrotoxins and  renally dose meds for GFR listed above.    Cr improved to baseline    Urinary retention  Urology consult   Renal US pending       Leukocytosis  Blood and Urine cx   IVFs   CXR and UA pending   IV rocephin empirically   Likely reactive     Trending down    BPH (benign prostatic hyperplasia)  --has indwelling mendes at home  --continue flomax and proscar       --Urology consult     SVT (supraventricular tachycardia)  --continue BB      Essential hypertension  --continue home meds  --cardiac diet      Alzheimer's dementia  Supportive care   Altered mental status on admission improved to baseline  ST to eval and treat, f/u recs modified diet    Acute pulmonary embolism  Hold Eliquis due to possible bleed  Resume when appropriate       VTE Risk Mitigation (From admission, onward)         Ordered     Place sequential compression device  Until discontinued         07/31/22 1205     IP VTE HIGH RISK PATIENT  Once         07/29/22 2258     Place sequential compression device  Until discontinued         07/29/22 2258     Reason for No Pharmacological VTE Prophylaxis  Once        Question:  Reasons:  Answer:  Active Bleeding    07/29/22 2258                Discharge Planning   ELIZABETH:      Code Status: Full Code   Is the patient medically ready for discharge?:     Reason for patient still in hospital (select all that apply): Patient trending condition, Laboratory test, Treatment and Consult recommendations  Discharge Plan A: Skilled Nursing Facility                  Katie Harris MD  Department of Hospital Medicine   [facility] Tele[facility] ([facility])

## 2022-07-31 NOTE — PROGRESS NOTES
[facility]  General Surgery  Progress Note    Subjective:     History of Present Illness:  81 y/o male referred for intraabdominal hemorrhage. The patient was brought in for decreased uop. HE underwent eval with CT showing concerns for intraabdominal blood. Subsequent cta showed no active arterial bleed. He was recently admitted for PE/DVT and started on anticoagulation, also recent urology procedure a month ago and indwelling catheter remains Denies any abdominal pain. His wife endorses chronic constipation but still having bowel movements with laxatives.      Post-Op Info:  * No surgery found *         Interval History: pt doing well with no complaints this am, denies any abdominal pain    Medications:  Continuous Infusions:  Scheduled Meds:   atorvastatin  10 mg Oral Daily    cefTRIAXone (ROCEPHIN) IVPB  1 g Intravenous Q24H    docusate sodium  100 mg Oral BID    finasteride  5 mg Oral Daily    folic acid  1 mg Oral Daily    metoprolol tartrate  25 mg Oral BID    midodrine  5 mg Oral TID WM    polyethylene glycol  17 g Oral Daily    sodium chloride 0.9%  10 mL Intravenous Q8H    tamsulosin  0.4 mg Oral QHS     PRN Meds:acetaminophen, acetaminophen, aluminum-magnesium hydroxide-simethicone, dextrose 10%, dextrose 10%, lactulose, melatonin, morphine, naloxone, ondansetron, oxyCODONE, promethazine, simethicone, sodium chloride 0.9%     Review of patient's allergies indicates:  No Known Allergies  Objective:     Vital Signs (Most Recent):  Temp: 98.3 °F (36.8 °C) (07/31/22 1302)  Pulse: 78 (07/31/22 1302)  Resp: 16 (07/31/22 1302)  BP: (!) 114/56 (07/31/22 1302)  SpO2: 97 % (07/31/22 1302)   Vital Signs (24h Range):  Temp:  [98.1 °F (36.7 °C)-99.2 °F (37.3 °C)] 98.3 °F (36.8 °C)  Pulse:  [] 78  Resp:  [16-18] 16  SpO2:  [93 %-98 %] 97 %  BP: (112-136)/(56-68) 114/56     Weight: 68.3 kg (150 lb 9.2 oz)  Body mass index is 21 kg/m².    Intake/Output - Last 3 Shifts         07/29  0700  07/30 0659 07/30 0700  07/31 0659 07/31 0700  08/01 0659    Urine (mL/kg/hr)  1170 (0.7)     Total Output  1170     Net  -1170                    Physical Exam  Vitals reviewed.   Constitutional:       Appearance: He is well-developed.      Comments: Elderly/frail   HENT:      Head: Normocephalic and atraumatic.   Cardiovascular:      Rate and Rhythm: Normal rate and regular rhythm.   Pulmonary:      Effort: Pulmonary effort is normal.   Abdominal:      General: Bowel sounds are normal. There is no distension.      Palpations: Abdomen is soft.      Tenderness: There is no abdominal tenderness.   Musculoskeletal:      Cervical back: Normal range of motion and neck supple.   Skin:     General: Skin is warm and dry.   Neurological:      Mental Status: He is alert.       Significant Labs:  I have reviewed all pertinent lab results within the past 24 hours.  CBC:   Recent Labs   Lab 07/31/22  0342   WBC 16.96*   RBC 2.47*   HGB 7.2*   HCT 22.5*      MCV 91   MCH 29.1   MCHC 32.0     BMP:   Recent Labs   Lab 07/31/22  0342   GLU 76   *   K 3.9      CO2 25   BUN 19   CREATININE 0.7   CALCIUM 8.2*       Significant Diagnostics:      Assessment/Plan:     * Intraabdominal hemorrhage  H/h slightly decreased, likely settling out, do not suspect ongoing bleeding at this time  Patient asymptomatic  Continue to trend h/h  Transfuse prn  No surgical indications at this time  Ok for diet    Constipation  Patient with stool burden noted on ct, having bowel movements  Recommend bowel regimen with enema and laxatives as patient requires at home    DESEAN (acute kidney injury)  Hydration  Monitor bun/cr/uop    Urinary retention  Urology following    Essential hypertension  Medical mgmt    Acute pulmonary embolism  Medical mgmt        Rafat Wellington MD  General Surgery  [facility] - Telemetry ([facility])

## 2022-07-31 NOTE — SUBJECTIVE & OBJECTIVE
Review of Systems   Constitutional:  Positive for activity change and fatigue. Negative for chills, fever and unexpected weight change.   HENT:  Negative for congestion.    Eyes:  Negative for visual disturbance.   Respiratory:  Negative for shortness of breath.    Cardiovascular:  Negative for chest pain.   Gastrointestinal:  Positive for constipation (chronic). Negative for abdominal distention, abdominal pain, nausea, rectal pain and vomiting.   Genitourinary:  Positive for difficulty urinating (chronic/mendes in place). Negative for dysuria.   Musculoskeletal:  Negative for arthralgias.   Skin:  Negative for rash.   Neurological:  Negative for light-headedness.   Hematological:  Negative for adenopathy.   Psychiatric/Behavioral:  Positive for confusion (chronic).    Objective:     Vital Signs (Most Recent):  Temp: 98.1 °F (36.7 °C) (07/31/22 0400)  Pulse: 99 (07/31/22 0400)  Resp: 18 (07/31/22 0400)  BP: 136/68 (07/31/22 0400)  SpO2: 95 % (07/31/22 0400) Vital Signs (24h Range):  Temp:  [98.1 °F (36.7 °C)-99.5 °F (37.5 °C)] 98.1 °F (36.7 °C)  Pulse:  [] 99  Resp:  [16-21] 18  SpO2:  [93 %-98 %] 95 %  BP: (108-136)/(59-68) 136/68     Weight: 68.3 kg (150 lb 9.2 oz)  Body mass index is 21 kg/m².    Intake/Output Summary (Last 24 hours) at 7/31/2022 1222  Last data filed at 7/31/2022 0404  Gross per 24 hour   Intake --   Output 870 ml   Net -870 ml      Physical Exam  Vitals and nursing note reviewed.   Constitutional:       Appearance: Normal appearance. He is well-developed. He is ill-appearing.      Comments: Frail/elderly/weak   HENT:      Head: Normocephalic and atraumatic.   Eyes:      Pupils: Pupils are equal, round, and reactive to light.   Cardiovascular:      Rate and Rhythm: Normal rate and regular rhythm.      Pulses: Normal pulses.      Heart sounds: Normal heart sounds.   Pulmonary:      Effort: Pulmonary effort is normal. No tachypnea, accessory muscle usage or respiratory distress.      Breath  sounds: Normal breath sounds.   Abdominal:      General: Bowel sounds are normal. There is no distension.      Palpations: Abdomen is soft.      Tenderness: There is no abdominal tenderness.   Genitourinary:     Comments: Gramajo cath inplace   Musculoskeletal:         General: Normal range of motion.      Cervical back: Normal range of motion and neck supple.   Skin:     General: Skin is warm and dry.      Capillary Refill: Capillary refill takes less than 2 seconds.      Coloration: Skin is pale.   Neurological:      Mental Status: He is alert and oriented to person, place, and time.      Deep Tendon Reflexes: Reflexes are normal and symmetric.   Psychiatric:         Speech: Speech normal.         Behavior: Behavior normal.         Thought Content: Thought content normal.         Judgment: Judgment normal.       Significant Labs: All pertinent labs within the past 24 hours have been reviewed.    Significant Imaging: I have reviewed all pertinent imaging results/findings within the past 24 hours.

## 2022-07-31 NOTE — ASSESSMENT & PLAN NOTE
Patient with acute kidney injury likely due to dehydration vs obstruction  DESEAN is currently worsening. Labs reviewed- Renal function/electrolytes with Estimated Creatinine Clearance: 81.3 mL/min (based on SCr of 0.7 mg/dL). according to latest data. Monitor urine output and serial BMP and adjust therapy as needed. Avoid nephrotoxins and renally dose meds for GFR listed above.    Cr improved to baseline

## 2022-07-31 NOTE — ASSESSMENT & PLAN NOTE
H/h slightly decreased, likely settling out, do not suspect ongoing bleeding at this time  Patient asymptomatic  Continue to trend h/h  Transfuse prn  No surgical indications at this time  Ok for diet

## 2022-07-31 NOTE — PLAN OF CARE
[facility] - Tele[facility] (Hospital)  Initial Discharge Assessment       Primary Care Provider: Bj Clayton MD    Admission Diagnosis: Urinary retention [R33.9]  Intraabdominal hemorrhage [R58]  DESEAN (acute kidney injury) [N17.9]    Admission Date: 7/29/2022  Expected Discharge Date:     Discharge Barriers Identified: None    Payor: MEDICARE / Plan: MEDICARE PART A & B / Product Type: Government /     Extended Emergency Contact Information  Primary Emergency Contact: STACIE PANIAGUA  Mobile Phone: [phone]  Relation: Spouse  Preferred language: English   needed? No    Discharge Plan A: Skilled Nursing Facility  Discharge Plan B: [facility]      [id number] - [place] - [address] AT [facility] [address]  [address]  [place] [address]  Phone: [phone] Fax: [phone]      Initial Assessment (most recent)       Adult Discharge Assessment - 07/31/22 1056          Discharge Assessment    Assessment Type Discharge Planning Assessment     Confirmed/corrected address, phone number and insurance Yes     Confirmed Demographics Correct on Facesheet     Source of Information family     Reason For Admission Urinary Retention     Lives With spouse   Currently in SNF at the [facility]    Do you expect to return to your current living situation? Yes     Do you have help at home or someone to help you manage your care at home? Yes     Who are your caregiver(s) and their phone number(s)? spouse Stacie [phone]     Prior to hospitilization cognitive status: --   Dementia    Walking or Climbing Stairs Difficulty ambulation difficulty, requires equipment;ambulation difficulty, assistance 1 person   Per spouse patient was independent prior to surgery 6/6/22    Dressing/Bathing Difficulty bathing difficulty, assistance 1 person;bathing difficulty, requires equipment     Equipment Currently Used at Home walker, rolling;hospital bed;bedside commode;wheelchair      Readmission within 30 days? Yes     Do you currently have service(s) that help you manage your care at home? Yes     Name and Contact number of agency [facility]     Do you have prescription coverage? Yes     Coverage Medicare     Who is going to help you get home at discharge? spouse     How do you get to doctors appointments? family or friend will provide;health plan transportation;public transportation     Are you on dialysis? No     Do you take coumadin? No   blood thinners placed on hold per spouse    Discharge Plan B Home Health     DME Needed Upon Discharge  none     Discharge Plan discussed with: Spouse/sig other     Discharge Barriers Identified None                     Readmission Assessment (most recent)       Readmission Assessment - 07/31/22 1112          Readmission    Why were you hospitalized in the last 30 days? Prev admission-presented to the ED with c/o hypoxia earlier in july. Pt was found with O2 sats of 80% by Home Health. Now patient has returned for urinary retention (P)      Why were you readmitted? Alarmed about signs/symptoms (P)      When you left the hospital where did you go? SNF (P)      Tell me about what happened between when you left the hospital and the day you returned. Prev admission-presentedwith c/o hypoxia earlier in july went to snf. Pt arrives via EMS from [facility]  with reports of no urine output x 8 hrs. 20Fr indwelling catheter in place. (P)      Did you have  a follow-up appointment on discharge? Yes (P)      Was this a planned readmission? No (P)                       Swer completed assessment. Patient has dementia. Per patient's spouse [facility] was arranged previously but then patient went to the [facility] for SNF. Spouse states patient was independent prior to surgery 6/6/22. He has hospital bed, bsc, walker, & wheelchair from [facility] per spouse. No needs verbalized at this time..Cee Polanco LMSW 7/31/2022 11:16 AM

## 2022-07-31 NOTE — SUBJECTIVE & OBJECTIVE
Interval History: pt doing well with no complaints this am, denies any abdominal pain    Medications:  Continuous Infusions:  Scheduled Meds:   atorvastatin  10 mg Oral Daily    cefTRIAXone (ROCEPHIN) IVPB  1 g Intravenous Q24H    docusate sodium  100 mg Oral BID    finasteride  5 mg Oral Daily    folic acid  1 mg Oral Daily    metoprolol tartrate  25 mg Oral BID    midodrine  5 mg Oral TID WM    polyethylene glycol  17 g Oral Daily    sodium chloride 0.9%  10 mL Intravenous Q8H    tamsulosin  0.4 mg Oral QHS     PRN Meds:acetaminophen, acetaminophen, aluminum-magnesium hydroxide-simethicone, dextrose 10%, dextrose 10%, lactulose, melatonin, morphine, naloxone, ondansetron, oxyCODONE, promethazine, simethicone, sodium chloride 0.9%     Review of patient's allergies indicates:  No Known Allergies  Objective:     Vital Signs (Most Recent):  Temp: 98.3 °F (36.8 °C) (07/31/22 1302)  Pulse: 78 (07/31/22 1302)  Resp: 16 (07/31/22 1302)  BP: (!) 114/56 (07/31/22 1302)  SpO2: 97 % (07/31/22 1302)   Vital Signs (24h Range):  Temp:  [98.1 °F (36.7 °C)-99.2 °F (37.3 °C)] 98.3 °F (36.8 °C)  Pulse:  [] 78  Resp:  [16-18] 16  SpO2:  [93 %-98 %] 97 %  BP: (112-136)/(56-68) 114/56     Weight: 68.3 kg (150 lb 9.2 oz)  Body mass index is 21 kg/m².    Intake/Output - Last 3 Shifts         07/29 0700  07/30 0659 07/30 0700 07/31 0659 07/31 0700  08/01 0659    Urine (mL/kg/hr)  1170 (0.7)     Total Output  1170     Net  -1170                    Physical Exam  Vitals reviewed.   Constitutional:       Appearance: He is well-developed.      Comments: Elderly/frail   HENT:      Head: Normocephalic and atraumatic.   Cardiovascular:      Rate and Rhythm: Normal rate and regular rhythm.   Pulmonary:      Effort: Pulmonary effort is normal.   Abdominal:      General: Bowel sounds are normal. There is no distension.      Palpations: Abdomen is soft.      Tenderness: There is no abdominal tenderness.   Musculoskeletal:      Cervical back:  Normal range of motion and neck supple.   Skin:     General: Skin is warm and dry.   Neurological:      Mental Status: He is alert.       Significant Labs:  I have reviewed all pertinent lab results within the past 24 hours.  CBC:   Recent Labs   Lab 07/31/22  0342   WBC 16.96*   RBC 2.47*   HGB 7.2*   HCT 22.5*      MCV 91   MCH 29.1   MCHC 32.0     BMP:   Recent Labs   Lab 07/31/22  0342   GLU 76   *   K 3.9      CO2 25   BUN 19   CREATININE 0.7   CALCIUM 8.2*       Significant Diagnostics:

## 2022-07-31 NOTE — HOSPITAL COURSE
Admited for intraabdominal hemorrhage per CT. Subsequent cta showed no active arterial bleed. He was recently admitted for PE/DVT and started on anticoagulation, held since admission. Continue to monitor Hb, mentation improving. Cr to baseline.General surgery evaluated- No surgical intervention at this time. +urinary retention. Mendes placed. Urology recommended cont mendes and f/u with Dr. Camarena.     8/1 hb/hct stable. Overnight experienced tachycardia, now sinus rhythm. +sleep disturbance. Surgery following with no indication for surgical intervention. Leukocytosis but afebrile. Blood culture negative growth to date.     8/2/22: H/H stable. Will restart Eliquis and monitor. Worsening WBC. Temp 100.4F last night. Check Procalcitonin. Repeat CXR- showed worsening pleural effusions/bibasilar infiltrates. Repeat UA pending. Cont Rocephin, add Azithromycin.     8/3/22: H/H stable on Eliquis. WBC trended down. Afebrile. Procalcitonin normal. Encouraged OOB and IS. The patient was accepted at the [facility]. He will be discharged to continue Cefdinir and Azithromycin. Mendes remained for urinary retention as advised by Urology. F/U with PCP in 3-5 days for repeat CBC, Heme/Onc for Acute PE, Urology for bladder lesion and urinary retention, and General surgery if any concern for continued intraabdominal bleeding.   The patient was seen and examined today and determined to be stable for discharge.

## 2022-07-31 NOTE — PT/OT/SLP EVAL
Speech Language Pathology Evaluation  Bedside Swallow    Patient Name:  Riley Saba   MRN:  [id number]  Admitting Diagnosis: Intraabdominal hemorrhage    Recommendations:                 General Recommendations:  Dysphagia therapy  Diet recommendations:  Puree, Thin   Aspiration Precautions: Alternating bites/sips, Assistance with meals, HOB to 90 degrees, Meds crushed in puree, Remain upright 30 minutes post meal, Small bites/sips and Strict aspiration precautions   General Precautions: Standard, aspiration, pureed diet  Communication strategies:  provide increased time to answer  Assessment:     Riley Saba is a 82 y.o. male with an SLP diagnosis of oropharyngeal dysphagia characterized by oral holding, poor oral acceptance due to limited oral cavity opening, impaired a-p transit, decrease hyolaryngeal excursion, and delayed swallow initiation. . Diet recommendations of pureed consistency diet (IDDSI 4) with thin liquids (IDDSI 0) following strict aspiration precautions and meal assist. Patient is at an increase risk of aspiration due to dementia.     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    Social History: Patient lives at home with spouse. He requires assistance with ADL's. Wife is primary caregiver.    Prior diet: Puree solids with thin liquids at home.    Subjective     Patient alert and seen at bedside with spouse.     Pain/Comfort:  · Pain Rating 1: 0/10  · Pain Rating Post-Intervention 1: 0/10    Respiratory Status: Room air    Objective:     Oral Musculature Evaluation  · Oral Musculature: general weakness  · Dentition: scattered dentition  · Secretion Management: adequate  · Mucosal Quality: good  · Mandibular Strength and Mobility:  impaired  · Oral Labial Strength and Mobility: impaired retraction, impaired pursing, impaired coordination (impaired coordination with straw-suck/swallow.)  · Lingual Strength and Mobility: impaired strength, impaired right lateral movement, impaired left lateral movement, impaired anterior elevation, impaired depression, functional protrusion  · Velar Elevation: WFL  · Buccal Strength and Mobility: decreased tone  · Volitional Cough: present, productive  · Volitional Swallow: present  · Voice Prior to PO Intake: WFL  · Oral Musculature Comments: Pt with severe cognitive deficits impacting ability to follow commands consistently.  hx dementia with functional decline since sx 6/2022.    Bedside Swallow Eval:   Consistencies Assessed:  · Thin liquids via straw  · Puree smooth     Oral Phase:   · holding  · Poor oral acceptance  · Slow oral transit time   · Impaired a-p transit      Pharyngeal Phase:   · decreased hyolaryngeal excursion to palpation  · delayed swallow initation    Treatment: Bedside swallowing evaluation completed. Patient known from previous admission. He presents with generalized weakness and scattered dentition upon OME. Volitional swallow and cough present. Observed po intake of smooth puree, thin liquids via straw, and medications crushed in puree. Caregiver assisted with feeding. Patient with limited oral cavity opening for po presentations. Oral holding and impaired a-p transit exhibited with puree solids requiring cues for liquid wash to clear at times. Delayed swallow initiation and decrease hyolaryngeal elevation noted upon manual palpitation. He tolerated thin liquids via straw without any overt s/s of aspiration. Patient required cues to swallow medications crushed in puree. Diet recommendations of pureed consistency diet (IDDSI 4) with thin liquids (IDDSI 0) following strict aspiration precautions and meal assist. Patient is at an increase risk of aspiration due to dementia. ST to follow  up for diet maintenance.     Assessment:     Riley Saba is a 82 y.o. male with an SLP diagnosis of oropharyngeal dysphagia characterized by oral holding, poor oral acceptance due to limited oral cavity opening, impaired a-p transit, decrease hyolaryngeal excursion, and delayed swallow initiation. . Diet recommendations of pureed consistency diet (IDDSI 4) with thin liquids (IDDSI 0) following strict aspiration precautions and meal assist. Patient is at an increase risk of aspiration due to dementia.     Goals:   Multidisciplinary Problems     SLP Goals        Problem: SLP    Goal Priority Disciplines Outcome   SLP Goal     SLP    Description: TPW tolerate current diet without any overt s/s of aspiration.    TPW tolerate the least restrictive diet without any overt s/s of aspiration                   Plan:     · Patient to be seen:  2 x/week   · Plan of Care expires:     · Plan of Care reviewed with:  patient, spouse   · SLP Follow-Up:  Yes       Discharge recommendations:  nursing facility, skilled     Time Tracking:     SLP Treatment Date:   07/31/22  Speech Start Time:  1224  Speech Stop Time:  1248     Speech Total Time (min):  24 min    Billable Minutes: Eval Swallow and Oral Function 19 07/31/2022

## 2022-08-01 LAB
BASOPHILS # BLD AUTO: 0.06 K/UL (ref 0–0.2)
BASOPHILS NFR BLD: 0.3 % (ref 0–1.9)
DIFFERENTIAL METHOD: ABNORMAL
EOSINOPHIL # BLD AUTO: 0.2 K/UL (ref 0–0.5)
EOSINOPHIL NFR BLD: 0.8 % (ref 0–8)
ERYTHROCYTE [DISTWIDTH] IN BLOOD BY AUTOMATED COUNT: 14.4 % (ref 11.5–14.5)
HCT VFR BLD AUTO: 21.9 % (ref 40–54)
HGB BLD-MCNC: 7.3 G/DL (ref 14–18)
IMM GRANULOCYTES # BLD AUTO: 0.18 K/UL (ref 0–0.04)
IMM GRANULOCYTES NFR BLD AUTO: 0.9 % (ref 0–0.5)
LYMPHOCYTES # BLD AUTO: 0.9 K/UL (ref 1–4.8)
LYMPHOCYTES NFR BLD: 4.3 % (ref 18–48)
MCH RBC QN AUTO: 29.9 PG (ref 27–31)
MCHC RBC AUTO-ENTMCNC: 33.3 G/DL (ref 32–36)
MCV RBC AUTO: 90 FL (ref 82–98)
MONOCYTES # BLD AUTO: 1.4 K/UL (ref 0.3–1)
MONOCYTES NFR BLD: 6.8 % (ref 4–15)
NEUTROPHILS # BLD AUTO: 17.3 K/UL (ref 1.8–7.7)
NEUTROPHILS NFR BLD: 86.9 % (ref 38–73)
NRBC BLD-RTO: 0 /100 WBC
PLATELET # BLD AUTO: 176 K/UL (ref 150–450)
PMV BLD AUTO: 10.2 FL (ref 9.2–12.9)
RBC # BLD AUTO: 2.44 M/UL (ref 4.6–6.2)
WBC # BLD AUTO: 19.89 K/UL (ref 3.9–12.7)

## 2022-08-01 PROCEDURE — A4216 STERILE WATER/SALINE, 10 ML: HCPCS | Performed by: NURSE PRACTITIONER

## 2022-08-01 PROCEDURE — 36415 COLL VENOUS BLD VENIPUNCTURE: CPT | Performed by: STUDENT IN AN ORGANIZED HEALTH CARE EDUCATION/TRAINING PROGRAM

## 2022-08-01 PROCEDURE — 25000003 PHARM REV CODE 250: Performed by: STUDENT IN AN ORGANIZED HEALTH CARE EDUCATION/TRAINING PROGRAM

## 2022-08-01 PROCEDURE — 25000003 PHARM REV CODE 250: Performed by: FAMILY MEDICINE

## 2022-08-01 PROCEDURE — 63600175 PHARM REV CODE 636 W HCPCS: Performed by: STUDENT IN AN ORGANIZED HEALTH CARE EDUCATION/TRAINING PROGRAM

## 2022-08-01 PROCEDURE — 92526 ORAL FUNCTION THERAPY: CPT

## 2022-08-01 PROCEDURE — 21400001 HC TELEMETRY ROOM

## 2022-08-01 PROCEDURE — 63600175 PHARM REV CODE 636 W HCPCS: Performed by: NURSE PRACTITIONER

## 2022-08-01 PROCEDURE — 25000003 PHARM REV CODE 250: Performed by: NURSE PRACTITIONER

## 2022-08-01 PROCEDURE — 99231 PR SUBSEQUENT HOSPITAL CARE,LEVL I: ICD-10-PCS | Mod: ,,, | Performed by: SURGERY

## 2022-08-01 PROCEDURE — 99231 SBSQ HOSP IP/OBS SF/LOW 25: CPT | Mod: ,,, | Performed by: SURGERY

## 2022-08-01 PROCEDURE — 85025 COMPLETE CBC W/AUTO DIFF WBC: CPT | Performed by: STUDENT IN AN ORGANIZED HEALTH CARE EDUCATION/TRAINING PROGRAM

## 2022-08-01 RX ORDER — ACETAMINOPHEN 325 MG/1
650 TABLET ORAL EVERY 6 HOURS PRN
Status: DISCONTINUED | OUTPATIENT
Start: 2022-08-01 | End: 2022-08-03 | Stop reason: HOSPADM

## 2022-08-01 RX ORDER — AMOXICILLIN 250 MG
1 CAPSULE ORAL DAILY
Status: DISCONTINUED | OUTPATIENT
Start: 2022-08-01 | End: 2022-08-02

## 2022-08-01 RX ORDER — SODIUM CHLORIDE 9 MG/ML
INJECTION, SOLUTION INTRAVENOUS
Status: DISCONTINUED | OUTPATIENT
Start: 2022-08-01 | End: 2022-08-03 | Stop reason: HOSPADM

## 2022-08-01 RX ORDER — METOPROLOL TARTRATE 1 MG/ML
5 INJECTION, SOLUTION INTRAVENOUS ONCE
Status: COMPLETED | OUTPATIENT
Start: 2022-08-01 | End: 2022-08-01

## 2022-08-01 RX ORDER — ACETAMINOPHEN 325 MG/1
650 TABLET ORAL EVERY 8 HOURS PRN
Status: DISCONTINUED | OUTPATIENT
Start: 2022-08-01 | End: 2022-08-01

## 2022-08-01 RX ADMIN — POLYETHYLENE GLYCOL 3350 17 G: 17 POWDER, FOR SOLUTION ORAL at 09:08

## 2022-08-01 RX ADMIN — ATORVASTATIN CALCIUM 10 MG: 10 TABLET, FILM COATED ORAL at 09:08

## 2022-08-01 RX ADMIN — FINASTERIDE 5 MG: 5 TABLET, FILM COATED ORAL at 09:08

## 2022-08-01 RX ADMIN — Medication 10 ML: at 10:08

## 2022-08-01 RX ADMIN — METOPROLOL TARTRATE 25 MG: 25 TABLET, FILM COATED ORAL at 09:08

## 2022-08-01 RX ADMIN — MIDODRINE HYDROCHLORIDE 5 MG: 5 TABLET ORAL at 05:08

## 2022-08-01 RX ADMIN — SODIUM CHLORIDE, SODIUM LACTATE, POTASSIUM CHLORIDE, AND CALCIUM CHLORIDE 1000 ML: .6; .31; .03; .02 INJECTION, SOLUTION INTRAVENOUS at 01:08

## 2022-08-01 RX ADMIN — DOCUSATE SODIUM 100 MG: 100 CAPSULE, LIQUID FILLED ORAL at 09:08

## 2022-08-01 RX ADMIN — METOROPROLOL TARTRATE 5 MG: 5 INJECTION, SOLUTION INTRAVENOUS at 01:08

## 2022-08-01 RX ADMIN — Medication 10 ML: at 02:08

## 2022-08-01 RX ADMIN — SODIUM CHLORIDE: 0.9 INJECTION, SOLUTION INTRAVENOUS at 12:08

## 2022-08-01 RX ADMIN — SENNOSIDES AND DOCUSATE SODIUM 1 TABLET: 50; 8.6 TABLET ORAL at 02:08

## 2022-08-01 RX ADMIN — MIDODRINE HYDROCHLORIDE 5 MG: 5 TABLET ORAL at 09:08

## 2022-08-01 RX ADMIN — MIDODRINE HYDROCHLORIDE 5 MG: 5 TABLET ORAL at 02:08

## 2022-08-01 RX ADMIN — CEFTRIAXONE 1 G: 1 INJECTION, SOLUTION INTRAVENOUS at 12:08

## 2022-08-01 NOTE — NURSING
POC reviewed with patient. Pt verbalized understanding  Pt remains free of injuries and falls; fall precaution in place.   NR on tele monitor.   PIV's SL, clean, dry, intact  Gramajo catheter clean, dry, intact w/700cc output this shift.  Pt oriented to self only. Pt's spouse at bedside.  No other c/o at this time.  Bed low, side rails x2, call light in reach, personal belongings at bedside, bed alarm on  Reminded to call for assistance.  Hourly rounding complete. Will continue to monitor.

## 2022-08-01 NOTE — PLAN OF CARE
Problem: Adult Inpatient Plan of Care  Goal: Plan of Care Review  Outcome: Ongoing, Progressing  Goal: Patient-Specific Goal (Individualized)  Outcome: Ongoing, Progressing  Goal: Absence of Hospital-Acquired Illness or Injury  Outcome: Ongoing, Progressing  Goal: Optimal Comfort and Wellbeing  Outcome: Ongoing, Progressing  Goal: Readiness for Transition of Care  Outcome: Ongoing, Progressing     Problem: Skin Injury Risk Increased  Goal: Skin Health and Integrity  Outcome: Ongoing, Progressing     Problem: Fluid and Electrolyte Imbalance (Acute Kidney Injury/Impairment)  Goal: Fluid and Electrolyte Balance  Outcome: Ongoing, Progressing     Problem: Oral Intake Inadequate (Acute Kidney Injury/Impairment)  Goal: Optimal Nutrition Intake  Outcome: Ongoing, Progressing     Problem: Renal Function Impairment (Acute Kidney Injury/Impairment)  Goal: Effective Renal Function  Outcome: Ongoing, Progressing     Problem: Infection  Goal: Absence of Infection Signs and Symptoms  Outcome: Ongoing, Progressing

## 2022-08-01 NOTE — SUBJECTIVE & OBJECTIVE
Interval History:  Patient with elevated heart rate overnight now normal, no abdominal pain    Medications:  Continuous Infusions:  Scheduled Meds:   atorvastatin  10 mg Oral Daily    cefTRIAXone (ROCEPHIN) IVPB  1 g Intravenous Q24H    docusate sodium  100 mg Oral BID    finasteride  5 mg Oral Daily    folic acid  1 mg Oral Daily    metoprolol tartrate  25 mg Oral BID    midodrine  5 mg Oral TID WM    polyethylene glycol  17 g Oral Daily    sodium chloride 0.9%  10 mL Intravenous Q8H    tamsulosin  0.4 mg Oral QHS     PRN Meds:sodium chloride 0.9%, acetaminophen, acetaminophen, aluminum-magnesium hydroxide-simethicone, dextrose 10%, dextrose 10%, lactulose, melatonin, morphine, naloxone, ondansetron, oxyCODONE, promethazine, simethicone, sodium chloride 0.9%     Review of patient's allergies indicates:  No Known Allergies  Objective:     Vital Signs (Most Recent):  Temp: 98.1 °F (36.7 °C) (08/01/22 1109)  Pulse: 83 (08/01/22 1109)  Resp: 17 (08/01/22 1109)  BP: 125/70 (08/01/22 1109)  SpO2: 96 % (08/01/22 1109) Vital Signs (24h Range):  Temp:  [98.1 °F (36.7 °C)-100.2 °F (37.9 °C)] 98.1 °F (36.7 °C)  Pulse:  [] 83  Resp:  [16-18] 17  SpO2:  [92 %-97 %] 96 %  BP: (112-133)/(56-74) 125/70     Weight: 68.3 kg (150 lb 9.2 oz)  Body mass index is 21 kg/m².    Intake/Output - Last 3 Shifts         07/30 0700 07/31 0659 07/31 0700 08/01 0659 08/01 0700  08/02 0659    Urine (mL/kg/hr) 1170 (0.7) 1050 (0.6) 400 (1.3)    Stool  0     Total Output 1170 1050 400    Net -1170 -1050 -400           Stool Occurrence  2 x             Physical Exam  Vitals reviewed.   Constitutional:       Appearance: He is well-developed.   HENT:      Head: Normocephalic and atraumatic.   Cardiovascular:      Rate and Rhythm: Normal rate and regular rhythm.   Pulmonary:      Effort: Pulmonary effort is normal.      Breath sounds: Normal breath sounds.   Abdominal:      General: Bowel sounds are normal. There is no distension.       Palpations: Abdomen is soft.      Tenderness: There is no abdominal tenderness.   Musculoskeletal:      Cervical back: Normal range of motion and neck supple.   Skin:     General: Skin is warm and dry.   Neurological:      Mental Status: He is alert and oriented to person, place, and time.       Significant Labs:  I have reviewed all pertinent lab results within the past 24 hours.  CBC:   Recent Labs   Lab 08/01/22  0536   WBC 19.89*   RBC 2.44*   HGB 7.3*   HCT 21.9*      MCV 90   MCH 29.9   MCHC 33.3     BMP:   Recent Labs   Lab 07/31/22  0342   GLU 76   *   K 3.9      CO2 25   BUN 19   CREATININE 0.7   CALCIUM 8.2*       Significant Diagnostics:

## 2022-08-01 NOTE — ASSESSMENT & PLAN NOTE
H/h stable, do not suspect ongoing bleeding at this time  Transfuse PRBCs prn  No surgical indications at this time  Ok for diet  Will sign off at this time please call with questions or concerns for rebleeding developed

## 2022-08-01 NOTE — SUBJECTIVE & OBJECTIVE
Interval History: See hospital course for today      Review of Systems   Unable to perform ROS: Dementia (wife providing collateral while pt rests)   Genitourinary:  Positive for difficulty urinating.   Neurological:  Positive for weakness.   Psychiatric/Behavioral:  Positive for sleep disturbance.    Objective:     Vital Signs (Most Recent):  Temp: 98.1 °F (36.7 °C) (08/01/22 1109)  Pulse: 83 (08/01/22 1109)  Resp: 17 (08/01/22 1109)  BP: 125/70 (08/01/22 1109)  SpO2: 96 % (08/01/22 1109) Vital Signs (24h Range):  Temp:  [98.1 °F (36.7 °C)-100.2 °F (37.9 °C)] 98.1 °F (36.7 °C)  Pulse:  [] 83  Resp:  [16-18] 17  SpO2:  [92 %-97 %] 96 %  BP: (112-133)/(56-74) 125/70     Weight: 68.3 kg (150 lb 9.2 oz)  Body mass index is 21 kg/m².    Intake/Output Summary (Last 24 hours) at 8/1/2022 1157  Last data filed at 8/1/2022 1000  Gross per 24 hour   Intake --   Output 1450 ml   Net -1450 ml      Physical Exam  Vitals and nursing note reviewed. Exam conducted with a chaperone present (wife).   Constitutional:       General: He is awake. He is not in acute distress.     Appearance: He is ill-appearing. He is not toxic-appearing.   HENT:      Head: Normocephalic and atraumatic.   Cardiovascular:      Rate and Rhythm: Normal rate.   Pulmonary:      Effort: Pulmonary effort is normal. No respiratory distress.   Abdominal:      General: There is distension.      Palpations: Abdomen is soft.      Tenderness: There is no abdominal tenderness.   Genitourinary:     Comments: Gramajo, chronic  Musculoskeletal:      Right lower leg: No edema.      Left lower leg: No edema.   Skin:     General: Skin is warm.      Coloration: Skin is pale.   Neurological:      Mental Status: He is easily aroused. He is lethargic.      Motor: Weakness present.       Significant Labs: All pertinent labs within the past 24 hours have been reviewed.    CBC:   Recent Labs   Lab 07/31/22  0342 08/01/22  0536   WBC 16.96* 19.89*   HGB 7.2* 7.3*   HCT 22.5*  21.9*    176     CMP:   Recent Labs   Lab 07/31/22  0342   *   K 3.9      CO2 25   GLU 76   BUN 19   CREATININE 0.7   CALCIUM 8.2*   PROT 4.6*   ALBUMIN 2.5*   BILITOT 0.7   ALKPHOS 63   AST 13   ALT 9*   ANIONGAP 8   EGFRNONAA >60         Significant Imaging: I have reviewed all pertinent imaging results/findings within the past 24 hours.

## 2022-08-01 NOTE — ASSESSMENT & PLAN NOTE
Patient with acute kidney injury likely due to dehydration vs obstruction  DESEAN is currently worsening. Labs reviewed- Renal function/electrolytes with Estimated Creatinine Clearance: 81.3 mL/min (based on SCr of 0.7 mg/dL). according to latest data. Monitor urine output and serial BMP and adjust therapy as needed. Avoid nephrotoxins and renally dose meds for GFR listed above.    Resolved

## 2022-08-01 NOTE — PROGRESS NOTES
[facility] Medicine  Progress Note    Patient Name: Riley Saba  MRN: [id number]  Patient Class: IP- Inpatient   Admission Date: 7/29/2022  Length of Stay: 2 days  Attending Physician: Case Monson MD  Primary Care Provider: Bj Clayton MD        Subjective:     Principal Problem:Intraabdominal hemorrhage        HPI:  Riley Saba is a 82 y.o. male patient with a PMHx of HLD, supraventricular tachycardia, and alzheimer's disease who presents to the Emergency Department for evaluation of urinary retention which onset this AM. Pt has had a mendes catheter since June 6th. Pt's family member states there was urine output last night and when the catheter was changed this AM urine output stopped.  Pt drank 2 cups of water last PM and two cups of water this AM. Pt also has a history of prostate issues. No associated sxs reported. Spouse/pt denies: fever, chills, cough, SOB, abd pain, BLE edema, and all other sx at this time.  ED workup shows: WBC 21.9K, H/H 10.5/31.3, Na 132, Creatinine 2.3; CT renal study concerning for hemorrhage. Small bilateral pleural effusions with associated compressive atelectasis. Bladder wall thickening which may relate to infection, L obstruction, or neurogenic bladder. CTA abdomen shows: No evidence of ongoing arterial bleeding most notably within the left upper quadrant there is no evidence of contrast extravasation on arterial or delayed phase of imaging. Dr. Wellington (General surgery) was consulted in the ED, recommends serial h/h. Hold anticoagulation, transfuse as needed. No role for surgical exploration at this time.   He is a full code and his SDM is his wife  He will be kept on OBS for further evaluation under the care of [facility] medicine.         Overview/Hospital Course:  Admited for intraabdominal hemorrhage per CT. Subsequent cta showed no active arterial bleed. He was recently admitted for PE/DVT and started on anticoagulation, held since  admission. Continue to monitor Hb, mentation improving. Cr to baseline.  8/1 hb/hct stable. Overnight experienced tachycardia, now sinus rhythm. +sleep disturbance. Surgery following with no indication for surgical intervention. Leukocytosis but afebrile. Blood culture negative growth to date.       Interval History: See hospital course for today      Review of Systems   Unable to perform ROS: Dementia (wife providing collateral while pt rests)   Genitourinary:  Positive for difficulty urinating.   Neurological:  Positive for weakness.   Psychiatric/Behavioral:  Positive for sleep disturbance.    Objective:     Vital Signs (Most Recent):  Temp: 98.1 °F (36.7 °C) (08/01/22 1109)  Pulse: 83 (08/01/22 1109)  Resp: 17 (08/01/22 1109)  BP: 125/70 (08/01/22 1109)  SpO2: 96 % (08/01/22 1109) Vital Signs (24h Range):  Temp:  [98.1 °F (36.7 °C)-100.2 °F (37.9 °C)] 98.1 °F (36.7 °C)  Pulse:  [] 83  Resp:  [16-18] 17  SpO2:  [92 %-97 %] 96 %  BP: (112-133)/(56-74) 125/70     Weight: 68.3 kg (150 lb 9.2 oz)  Body mass index is 21 kg/m².    Intake/Output Summary (Last 24 hours) at 8/1/2022 1157  Last data filed at 8/1/2022 1000  Gross per 24 hour   Intake --   Output 1450 ml   Net -1450 ml      Physical Exam  Vitals and nursing note reviewed. Exam conducted with a chaperone present (wife).   Constitutional:       General: He is awake. He is not in acute distress.     Appearance: He is ill-appearing. He is not toxic-appearing.   HENT:      Head: Normocephalic and atraumatic.   Cardiovascular:      Rate and Rhythm: Normal rate.   Pulmonary:      Effort: Pulmonary effort is normal. No respiratory distress.   Abdominal:      General: There is distension.      Palpations: Abdomen is soft.      Tenderness: There is no abdominal tenderness.   Genitourinary:     Comments: Gramajo, chronic  Musculoskeletal:      Right lower leg: No edema.      Left lower leg: No edema.   Skin:     General: Skin is warm.      Coloration: Skin is pale.    Neurological:      Mental Status: He is easily aroused. He is lethargic.      Motor: Weakness present.       Significant Labs: All pertinent labs within the past 24 hours have been reviewed.    CBC:   Recent Labs   Lab 07/31/22  0342 08/01/22  0536   WBC 16.96* 19.89*   HGB 7.2* 7.3*   HCT 22.5* 21.9*    176     CMP:   Recent Labs   Lab 07/31/22  0342   *   K 3.9      CO2 25   GLU 76   BUN 19   CREATININE 0.7   CALCIUM 8.2*   PROT 4.6*   ALBUMIN 2.5*   BILITOT 0.7   ALKPHOS 63   AST 13   ALT 9*   ANIONGAP 8   EGFRNONAA >60         Significant Imaging: I have reviewed all pertinent imaging results/findings within the past 24 hours.      Assessment/Plan:      * Intraabdominal hemorrhage  CT renal study concerning for hemorrhage.   CTA abdomen shows: No evidence of ongoing arterial bleeding most notably within the left upper quadrant there is no evidence of contrast extravasation on arterial or delayed phase of imaging.   Dr. Wellington (General surgery) was consulted in the ED, recommends serial h/h. Hold anticoagulation, transfuse as needed. No role for surgical exploration at this time.     Transfuse Hb<7      Constipation  Bowel regimen      DESEAN (acute kidney injury)  Patient with acute kidney injury likely due to dehydration vs obstruction  DESEAN is currently worsening. Labs reviewed- Renal function/electrolytes with Estimated Creatinine Clearance: 81.3 mL/min (based on SCr of 0.7 mg/dL). according to latest data. Monitor urine output and serial BMP and adjust therapy as needed. Avoid nephrotoxins and renally dose meds for GFR listed above.    Resolved     Urinary retention  BPH  Urology consult   Renal US reviewed, no hydronephrosis  Keep mendes and follow up outpatient with primary urologist      Orthostatic hypotension  Resume midodrine      Leukocytosis  Blood culture negative growth to date   IVFs   CXR unrevealing  UA culture negative growth to date   IV rocephin empirically   Likely reactive          BPH (benign prostatic hyperplasia)  Urology consulted  Recommend follow up outpatient with primary urologist  Keep mendes  Us retroperitoneal reviewed  Continue flomax and proscar    SVT (supraventricular tachycardia)  Continue home beta blocker     Essential hypertension  Continue home medication(s)  On midodrine and beta blocker      Alzheimer's dementia  Supportive care   Altered mental status on admission improved to baseline  ST to eval and treat, f/u recs modified diet    Acute pulmonary embolism  Hold Eliquis due to possible bleed  Resume when appropriate        VTE Risk Mitigation (From admission, onward)         Ordered     Place sequential compression device  Until discontinued         07/31/22 1205     IP VTE HIGH RISK PATIENT  Once         07/29/22 2258     Place sequential compression device  Until discontinued         07/29/22 2258     Reason for No Pharmacological VTE Prophylaxis  Once        Question:  Reasons:  Answer:  Active Bleeding    07/29/22 2258                Discharge Planning   ELIZABETH:      Code Status: Full Code   Is the patient medically ready for discharge?:     Reason for patient still in hospital (select all that apply): Patient trending condition, Laboratory test, Treatment, Imaging, Consult recommendations, PT / OT recommendations and Pending disposition  Discharge Plan A: Skilled Nursing Facility                  Case Monson MD  Department of Hospital Medicine   [facility] - Telemetry ([facility])

## 2022-08-01 NOTE — ASSESSMENT & PLAN NOTE
Urology consulted  Recommend follow up outpatient with primary urologist  Keep mendes  Us retroperitoneal reviewed  Continue flomax and proscar

## 2022-08-01 NOTE — PROGRESS NOTES
[facility]  General Surgery  Progress Note    Subjective:     History of Present Illness:  81 y/o male referred for intraabdominal hemorrhage. The patient was brought in for decreased uop. HE underwent eval with CT showing concerns for intraabdominal blood. Subsequent cta showed no active arterial bleed. He was recently admitted for PE/DVT and started on anticoagulation, also recent urology procedure a month ago and indwelling catheter remains Denies any abdominal pain. His wife endorses chronic constipation but still having bowel movements with laxatives.      Post-Op Info:  * No surgery found *         Interval History:  Patient with elevated heart rate overnight now normal, no abdominal pain    Medications:  Continuous Infusions:  Scheduled Meds:   atorvastatin  10 mg Oral Daily    cefTRIAXone (ROCEPHIN) IVPB  1 g Intravenous Q24H    docusate sodium  100 mg Oral BID    finasteride  5 mg Oral Daily    folic acid  1 mg Oral Daily    metoprolol tartrate  25 mg Oral BID    midodrine  5 mg Oral TID WM    polyethylene glycol  17 g Oral Daily    sodium chloride 0.9%  10 mL Intravenous Q8H    tamsulosin  0.4 mg Oral QHS     PRN Meds:sodium chloride 0.9%, acetaminophen, acetaminophen, aluminum-magnesium hydroxide-simethicone, dextrose 10%, dextrose 10%, lactulose, melatonin, morphine, naloxone, ondansetron, oxyCODONE, promethazine, simethicone, sodium chloride 0.9%     Review of patient's allergies indicates:  No Known Allergies  Objective:     Vital Signs (Most Recent):  Temp: 98.1 °F (36.7 °C) (08/01/22 1109)  Pulse: 83 (08/01/22 1109)  Resp: 17 (08/01/22 1109)  BP: 125/70 (08/01/22 1109)  SpO2: 96 % (08/01/22 1109) Vital Signs (24h Range):  Temp:  [98.1 °F (36.7 °C)-100.2 °F (37.9 °C)] 98.1 °F (36.7 °C)  Pulse:  [] 83  Resp:  [16-18] 17  SpO2:  [92 %-97 %] 96 %  BP: (112-133)/(56-74) 125/70     Weight: 68.3 kg (150 lb 9.2 oz)  Body mass index is 21 kg/m².    Intake/Output - Last 3  Shifts         07/30 0700 07/31 0659 07/31 0700 08/01 0659 08/01 0700 08/02 0659    Urine (mL/kg/hr) 1170 (0.7) 1050 (0.6) 400 (1.3)    Stool  0     Total Output 1170 1050 400    Net -1170 -1050 -400           Stool Occurrence  2 x             Physical Exam  Vitals reviewed.   Constitutional:       Appearance: He is well-developed.   HENT:      Head: Normocephalic and atraumatic.   Cardiovascular:      Rate and Rhythm: Normal rate and regular rhythm.   Pulmonary:      Effort: Pulmonary effort is normal.      Breath sounds: Normal breath sounds.   Abdominal:      General: Bowel sounds are normal. There is no distension.      Palpations: Abdomen is soft.      Tenderness: There is no abdominal tenderness.   Musculoskeletal:      Cervical back: Normal range of motion and neck supple.   Skin:     General: Skin is warm and dry.   Neurological:      Mental Status: He is alert and oriented to person, place, and time.       Significant Labs:  I have reviewed all pertinent lab results within the past 24 hours.  CBC:   Recent Labs   Lab 08/01/22  0536   WBC 19.89*   RBC 2.44*   HGB 7.3*   HCT 21.9*      MCV 90   MCH 29.9   MCHC 33.3     BMP:   Recent Labs   Lab 07/31/22  0342   GLU 76   *   K 3.9      CO2 25   BUN 19   CREATININE 0.7   CALCIUM 8.2*       Significant Diagnostics:      Assessment/Plan:     * Intraabdominal hemorrhage  H/h stable, do not suspect ongoing bleeding at this time  Transfuse PRBCs prn  No surgical indications at this time  Ok for diet  Will sign off at this time please call with questions or concerns for rebleeding developed    Constipation  Patient with stool burden noted on ct, having bowel movements  Recommend bowel regimen with enema and laxatives as patient requires at home    DESEAN (acute kidney injury)  Hydration  Monitor bun/cr/uop    Urinary retention  Urology following    SVT (supraventricular tachycardia)  Controlled with medications  Continue medical  management    Essential hypertension  Medical mgmt    Acute pulmonary embolism  Medical mgmt        Rafat Wellington MD  General Surgery  [facility] - Telemetry ([facility])

## 2022-08-01 NOTE — PT/OT/SLP PROGRESS
Speech Language Pathology Treatment    Patient Name:  Riley Saba   MRN:  [id number]  Admitting Diagnosis: Intraabdominal hemorrhage    Recommendations:                 General Recommendations:  Follow-up not indicated; Nutrition consult noted  Diet recommendations:  Puree Diet - IDDSI Level 4, Liquid Diet Level: Thin liquids - IDDSI Level 0   Aspiration Precautions: Assistance with meals, HOB to 90 degrees, Meds crushed in puree, Remain upright 30 minutes post meal and Standard aspiration precautions   General Precautions: Standard, aspiration    Subjective     Patient seen at bedside with spouse. He exhibited varying levels of alertness.    Pain/Comfort:  · Pain Rating 1: 0/10  · Pain Rating Post-Intervention 1: 0/10      Objective:     Has the patient been evaluated by SLP for swallowing?   Yes  Keep patient NPO? No   Current Respiratory Status:        Patient with varying levels of alertness. Observed po intake of thin liquids via spoon and medications crushed in puree. Patient with limited intake due to forward motion of lingual muscle and limited oral cavity opening. He required cuea to swallow at times with a liquid wash. Patient assisted with intake by wife, primary caregiver. Patient tolerated consistencies but he is at risk for malnutrition due to small/limited intake.    Assessment:     Riley Saba is a 82 y.o. male with an SLP diagnosis of oropharyngeal dysphagia characterized by oral holding, poor oral acceptance due to limited oral cavity opening, impaired a-p transit, decrease hyolaryngeal excursion, and delayed swallow initiation. . Diet recommendations of pureed consistency diet (IDDSI 4) with thin liquids (IDDSI 0) following strict aspiration precautions and meal assist. Patient is at an increase risk of aspiration due to dementia.     Goals:   Multidisciplinary Problems     SLP Goals     Not on file          Multidisciplinary Problems (Resolved)        Problem: SLP    Goal Priority  Disciplines Outcome   SLP Goal   (Resolved)     SLP Met   Description: TPW tolerate current diet without any overt s/s of aspiration.    TPW tolerate the least restrictive diet without any overt s/s of aspiration                   Plan:     · Patient to be seen:  2 x/week   · Plan of Care expires:     · Plan of Care reviewed with:  spouse, patient   · SLP Follow-Up:  No       Discharge recommendations:  nursing facility, skilled       Time Tracking:     SLP Treatment Date:   08/01/22  Speech Start Time:  1413  Speech Stop Time:  1437     Speech Total Time (min):  24 min    Billable Minutes: Treatment Swallowing Dysfunction 13    08/01/2022

## 2022-08-01 NOTE — PLAN OF CARE
Problem: Adult Inpatient Plan of Care  Goal: Absence of Hospital-Acquired Illness or Injury  Outcome: Ongoing, Progressing     Pt oriented to self   SR on monitor   Blood pressure stable   Low grade temp   Turn Q two   Consult to speech and nutrition   PT/OT   Plan for SNF once stable for DC    [No] : In the past 12 months have you used drugs other than those required for medical reasons? No [No falls in past year] : Patient reported no falls in the past year [0] : 2) Feeling down, depressed, or hopeless: Not at all (0) [] : No [de-identified] : walking, housework [[id number]] : 0

## 2022-08-01 NOTE — NURSING
Received call from [employer].  Pt's HR into the 170s. Pt was being turned and examined at the time.  Pt resting comfortably now.  HR decreasing

## 2022-08-01 NOTE — ASSESSMENT & PLAN NOTE
BPH  Urology consult   Renal US reviewed, no hydronephrosis  Keep mendes and follow up outpatient with primary urologist

## 2022-08-02 ENCOUNTER — TELEPHONE (OUTPATIENT)
Dept: HEMATOLOGY/ONCOLOGY | Facility: CLINIC | Age: 82
End: 2022-08-02
Payer: MEDICARE

## 2022-08-02 PROBLEM — N17.9 AKI (ACUTE KIDNEY INJURY): Status: RESOLVED | Noted: 2022-07-29 | Resolved: 2022-08-02

## 2022-08-02 LAB
BNP SERPL-MCNC: 309 PG/ML (ref 0–99)
LACTATE SERPL-SCNC: 0.6 MMOL/L (ref 0.5–2.2)
PROCALCITONIN SERPL IA-MCNC: 0.11 NG/ML

## 2022-08-02 PROCEDURE — 84145 PROCALCITONIN (PCT): CPT | Performed by: NURSE PRACTITIONER

## 2022-08-02 PROCEDURE — 83880 ASSAY OF NATRIURETIC PEPTIDE: CPT | Performed by: NURSE PRACTITIONER

## 2022-08-02 PROCEDURE — 97535 SELF CARE MNGMENT TRAINING: CPT

## 2022-08-02 PROCEDURE — 63600175 PHARM REV CODE 636 W HCPCS: Performed by: NURSE PRACTITIONER

## 2022-08-02 PROCEDURE — 25000003 PHARM REV CODE 250: Performed by: NURSE PRACTITIONER

## 2022-08-02 PROCEDURE — 21400001 HC TELEMETRY ROOM

## 2022-08-02 PROCEDURE — 97530 THERAPEUTIC ACTIVITIES: CPT

## 2022-08-02 PROCEDURE — 97116 GAIT TRAINING THERAPY: CPT | Mod: CQ

## 2022-08-02 PROCEDURE — 25000003 PHARM REV CODE 250: Performed by: STUDENT IN AN ORGANIZED HEALTH CARE EDUCATION/TRAINING PROGRAM

## 2022-08-02 PROCEDURE — 36415 COLL VENOUS BLD VENIPUNCTURE: CPT | Performed by: NURSE PRACTITIONER

## 2022-08-02 PROCEDURE — 83605 ASSAY OF LACTIC ACID: CPT | Performed by: NURSE PRACTITIONER

## 2022-08-02 PROCEDURE — A4216 STERILE WATER/SALINE, 10 ML: HCPCS | Performed by: NURSE PRACTITIONER

## 2022-08-02 PROCEDURE — 97530 THERAPEUTIC ACTIVITIES: CPT | Mod: CQ

## 2022-08-02 PROCEDURE — 94761 N-INVAS EAR/PLS OXIMETRY MLT: CPT

## 2022-08-02 RX ORDER — DOCUSATE SODIUM 100 MG/1
100 CAPSULE, LIQUID FILLED ORAL DAILY
Status: DISCONTINUED | OUTPATIENT
Start: 2022-08-02 | End: 2022-08-03 | Stop reason: HOSPADM

## 2022-08-02 RX ORDER — MIDODRINE HYDROCHLORIDE 5 MG/1
5 TABLET ORAL
Status: DISCONTINUED | OUTPATIENT
Start: 2022-08-02 | End: 2022-08-03 | Stop reason: HOSPADM

## 2022-08-02 RX ORDER — MIDODRINE HYDROCHLORIDE 5 MG/1
5 TABLET ORAL
Status: DISCONTINUED | OUTPATIENT
Start: 2022-08-02 | End: 2022-08-02

## 2022-08-02 RX ORDER — HYDROCODONE BITARTRATE AND ACETAMINOPHEN 5; 325 MG/1; MG/1
1 TABLET ORAL EVERY 6 HOURS PRN
Status: DISCONTINUED | OUTPATIENT
Start: 2022-08-02 | End: 2022-08-03 | Stop reason: HOSPADM

## 2022-08-02 RX ORDER — MIDODRINE HYDROCHLORIDE 2.5 MG/1
2.5 TABLET ORAL
Status: DISCONTINUED | OUTPATIENT
Start: 2022-08-02 | End: 2022-08-02

## 2022-08-02 RX ADMIN — AZITHROMYCIN 500 MG: 500 INJECTION, POWDER, LYOPHILIZED, FOR SOLUTION INTRAVENOUS at 04:08

## 2022-08-02 RX ADMIN — APIXABAN 5 MG: 2.5 TABLET, FILM COATED ORAL at 08:08

## 2022-08-02 RX ADMIN — METOPROLOL TARTRATE 25 MG: 25 TABLET, FILM COATED ORAL at 09:08

## 2022-08-02 RX ADMIN — DOCUSATE SODIUM 100 MG: 100 CAPSULE, LIQUID FILLED ORAL at 09:08

## 2022-08-02 RX ADMIN — METOPROLOL TARTRATE 25 MG: 25 TABLET, FILM COATED ORAL at 08:08

## 2022-08-02 RX ADMIN — MIDODRINE HYDROCHLORIDE 5 MG: 5 TABLET ORAL at 07:08

## 2022-08-02 RX ADMIN — POLYETHYLENE GLYCOL 3350 17 G: 17 POWDER, FOR SOLUTION ORAL at 09:08

## 2022-08-02 RX ADMIN — CEFTRIAXONE 1 G: 1 INJECTION, SOLUTION INTRAVENOUS at 01:08

## 2022-08-02 RX ADMIN — FOLIC ACID 1 MG: 1 TABLET ORAL at 09:08

## 2022-08-02 RX ADMIN — ATORVASTATIN CALCIUM 10 MG: 10 TABLET, FILM COATED ORAL at 09:08

## 2022-08-02 RX ADMIN — ACETAMINOPHEN 650 MG: 325 TABLET ORAL at 08:08

## 2022-08-02 RX ADMIN — MIDODRINE HYDROCHLORIDE 5 MG: 5 TABLET ORAL at 04:08

## 2022-08-02 RX ADMIN — FINASTERIDE 5 MG: 5 TABLET, FILM COATED ORAL at 09:08

## 2022-08-02 RX ADMIN — SODIUM CHLORIDE: 0.9 INJECTION, SOLUTION INTRAVENOUS at 01:08

## 2022-08-02 RX ADMIN — Medication 10 ML: at 04:08

## 2022-08-02 RX ADMIN — APIXABAN 5 MG: 2.5 TABLET, FILM COATED ORAL at 09:08

## 2022-08-02 NOTE — SUBJECTIVE & OBJECTIVE
Interval History: See hospital course     Review of Systems   Unable to perform ROS: Dementia (wife providing collateral while pt rests)   Genitourinary:  Positive for difficulty urinating.   Neurological:  Positive for weakness.   Psychiatric/Behavioral:  Positive for sleep disturbance.    Objective:     Vital Signs (Most Recent):  Temp: 98.2 °F (36.8 °C) (08/02/22 1131)  Pulse: 98 (08/02/22 1305)  Resp: 17 (08/02/22 1131)  BP: 137/75 (08/02/22 1132)  SpO2: 96 % (08/02/22 1131) Vital Signs (24h Range):  Temp:  [97.5 °F (36.4 °C)-100.4 °F (38 °C)] 98.2 °F (36.8 °C)  Pulse:  [72-98] 98  Resp:  [17-18] 17  SpO2:  [95 %-96 %] 96 %  BP: (115-155)/(70-85) 137/75     Weight: 72.7 kg (160 lb 4.4 oz)  Body mass index is 22.35 kg/m².    Intake/Output Summary (Last 24 hours) at 8/2/2022 1505  Last data filed at 8/2/2022 0633  Gross per 24 hour   Intake 198.7 ml   Output 4200 ml   Net -4001.3 ml        Physical Exam  Vitals and nursing note reviewed. Exam conducted with a chaperone present (wife).   Constitutional:       General: He is awake. He is not in acute distress.     Appearance: He is ill-appearing. He is not toxic-appearing.   HENT:      Head: Normocephalic and atraumatic.   Cardiovascular:      Rate and Rhythm: Normal rate.   Pulmonary:      Effort: Pulmonary effort is normal. No respiratory distress.   Abdominal:      Palpations: Abdomen is soft.      Tenderness: There is no abdominal tenderness.   Genitourinary:     Comments: Gramajo, chronic  Musculoskeletal:      Right lower leg: No edema.      Left lower leg: No edema.   Skin:     General: Skin is warm.      Coloration: Skin is pale.   Neurological:      Mental Status: He is alert and easily aroused.      Motor: Weakness present.      Comments: Oriented to person only        Significant Labs: All pertinent labs within the past 24 hours have been reviewed.    CBC:   Recent Labs   Lab 08/01/22  0536   WBC 19.89*   HGB 7.3*   HCT 21.9*          CMP:   No  results for input(s): NA, K, CL, CO2, GLU, BUN, CREATININE, CALCIUM, PROT, ALBUMIN, BILITOT, ALKPHOS, AST, ALT, ANIONGAP, EGFRNONAA in the last 48 hours.    Invalid input(s): ESTGFAFRICA        Significant Imaging: I have reviewed all pertinent imaging results/findings within the past 24 hours.

## 2022-08-02 NOTE — PROGRESS NOTES
[facility] Medicine  Progress Note    Patient Name: Riley Saba  MRN: [id number]  Patient Class: IP- Inpatient   Admission Date: 7/29/2022  Length of Stay: 3 days  Attending Physician: Case Monson MD  Primary Care Provider: Bj Clayton MD        Subjective:     Principal Problem:Intraabdominal hemorrhage        HPI:  Riley Saba is a 82 y.o. male patient with a PMHx of HLD, supraventricular tachycardia, and alzheimer's disease who presents to the Emergency Department for evaluation of urinary retention which onset this AM. Pt has had a mendes catheter since June 6th. Pt's family member states there was urine output last night and when the catheter was changed this AM urine output stopped.  Pt drank 2 cups of water last PM and two cups of water this AM. Pt also has a history of prostate issues. No associated sxs reported. Spouse/pt denies: fever, chills, cough, SOB, abd pain, BLE edema, and all other sx at this time.  ED workup shows: WBC 21.9K, H/H 10.5/31.3, Na 132, Creatinine 2.3; CT renal study concerning for hemorrhage. Small bilateral pleural effusions with associated compressive atelectasis. Bladder wall thickening which may relate to infection, L obstruction, or neurogenic bladder. CTA abdomen shows: No evidence of ongoing arterial bleeding most notably within the left upper quadrant there is no evidence of contrast extravasation on arterial or delayed phase of imaging. Dr. Wellington (General surgery) was consulted in the ED, recommends serial h/h. Hold anticoagulation, transfuse as needed. No role for surgical exploration at this time.   He is a full code and his SDM is his wife  He will be kept on OBS for further evaluation under the care of hospitals medicine.         Overview/Hospital Course:  Admited for intraabdominal hemorrhage per CT. Subsequent cta showed no active arterial bleed. He was recently admitted for PE/DVT and started on anticoagulation, held since  admission. Continue to monitor Hb, mentation improving. Cr to baseline.General surgery evaluated- No surgical intervention at this time. +urinary retention. Mendes placed. Urology recommended cont mendes and f/u with Dr. Camarena.     8/1 hb/hct stable. Overnight experienced tachycardia, now sinus rhythm. +sleep disturbance. Surgery following with no indication for surgical intervention. Leukocytosis but afebrile. Blood culture negative growth to date.     8/2/22: H/H stable. Will restart Eliquis and monitor. Worsening WBC. Temp 100.4F last night. Check Procalcitonin. Repeat CXR- showed worsening pleural effusions/bibasilar infiltrates. Repeat UA pending. Cont Rocephin, add Azithromycin.       Interval History: See hospital course     Review of Systems   Unable to perform ROS: Dementia (wife providing collateral while pt rests)   Genitourinary:  Positive for difficulty urinating.   Neurological:  Positive for weakness.   Psychiatric/Behavioral:  Positive for sleep disturbance.    Objective:     Vital Signs (Most Recent):  Temp: 98.2 °F (36.8 °C) (08/02/22 1131)  Pulse: 98 (08/02/22 1305)  Resp: 17 (08/02/22 1131)  BP: 137/75 (08/02/22 1132)  SpO2: 96 % (08/02/22 1131) Vital Signs (24h Range):  Temp:  [97.5 °F (36.4 °C)-100.4 °F (38 °C)] 98.2 °F (36.8 °C)  Pulse:  [72-98] 98  Resp:  [17-18] 17  SpO2:  [95 %-96 %] 96 %  BP: (115-155)/(70-85) 137/75     Weight: 72.7 kg (160 lb 4.4 oz)  Body mass index is 22.35 kg/m².    Intake/Output Summary (Last 24 hours) at 8/2/2022 1505  Last data filed at 8/2/2022 0633  Gross per 24 hour   Intake 198.7 ml   Output 4200 ml   Net -4001.3 ml        Physical Exam  Vitals and nursing note reviewed. Exam conducted with a chaperone present (wife).   Constitutional:       General: He is awake. He is not in acute distress.     Appearance: He is ill-appearing. He is not toxic-appearing.   HENT:      Head: Normocephalic and atraumatic.   Cardiovascular:      Rate and Rhythm: Normal rate.    Pulmonary:      Effort: Pulmonary effort is normal. No respiratory distress.   Abdominal:      Palpations: Abdomen is soft.      Tenderness: There is no abdominal tenderness.   Genitourinary:     Comments: Gramajo, chronic  Musculoskeletal:      Right lower leg: No edema.      Left lower leg: No edema.   Skin:     General: Skin is warm.      Coloration: Skin is pale.   Neurological:      Mental Status: He is alert and easily aroused.      Motor: Weakness present.      Comments: Oriented to person only        Significant Labs: All pertinent labs within the past 24 hours have been reviewed.    CBC:   Recent Labs   Lab 08/01/22  0536   WBC 19.89*   HGB 7.3*   HCT 21.9*          CMP:   No results for input(s): NA, K, CL, CO2, GLU, BUN, CREATININE, CALCIUM, PROT, ALBUMIN, BILITOT, ALKPHOS, AST, ALT, ANIONGAP, EGFRNONAA in the last 48 hours.    Invalid input(s): ESTGFAFRICA        Significant Imaging: I have reviewed all pertinent imaging results/findings within the past 24 hours.      Assessment/Plan:      * Intraabdominal hemorrhage  CT renal study concerning for hemorrhage.   CTA abdomen shows: No evidence of ongoing arterial bleeding most notably within the left upper quadrant there is no evidence of contrast extravasation on arterial or delayed phase of imaging.   Dr. Wellington (General surgery) was consulted in the ED, recommends serial h/h. Hold anticoagulation, transfuse as needed. No role for surgical exploration at this time.     Transfuse Hb<7    8/2/22: H/H stable. Will restart Eliquis and monitor.     Leukocytosis  Blood culture negative growth to date   IVFs   CXR unrevealing  UA culture negative growth to date   IV rocephin empirically   Likely reactive     8/2/22:Worsening WBC. Temp 100.4F last night. Check Procalcitonin. Repeat CXR- showed worsening pleural effusions/bibasilar infiltrates. Repeat UA pending. Cont Rocephin, add Azithromycin.       Urinary retention  BPH  Urology consulted  Renal US  reviewed, no hydronephrosis  Keep mendes and follow up outpatient with primary urologist      Constipation  Bowel regimen      Orthostatic hypotension  Cont  midodrine      BPH (benign prostatic hyperplasia)  Urology consulted  Recommend follow up outpatient with primary urologist  Keep mendes  Us retroperitoneal reviewed  Continue flomax and proscar    SVT (supraventricular tachycardia)  Continue home beta blocker     Essential hypertension  Continue home medication(s)  On midodrine and beta blocker      Alzheimer's dementia  Supportive care   Altered mental status on admission improved to baseline  ST to eval and treat   f/u recs-pureed diet    Acute pulmonary embolism  Hold Eliquis due to possible bleed  Resume when appropriate    8/2/22: restart Eliquis and monitor         VTE Risk Mitigation (From admission, onward)         Ordered     apixaban tablet 5 mg  2 times daily         08/02/22 0921     Place sequential compression device  Until discontinued         07/31/22 1205     IP VTE HIGH RISK PATIENT  Once         07/29/22 2258     Place sequential compression device  Until discontinued         07/29/22 2258     Reason for No Pharmacological VTE Prophylaxis  Once        Question:  Reasons:  Answer:  Active Bleeding    07/29/22 2258                Discharge Planning   ELIZABETH:      Code Status: Full Code   Is the patient medically ready for discharge?:     Reason for patient still in hospital (select all that apply): Patient trending condition  Discharge Plan A: Skilled Nursing Facility                  Annabel Cox NP  Department of Hospital Medicine   [facility] - Telemetry ([facility])

## 2022-08-02 NOTE — PT/OT/SLP PROGRESS
Occupational Therapy   Treatment    Name: Riley Saba  MRN: [id number]  Admitting Diagnosis:  Intraabdominal hemorrhage       Recommendations:     Discharge Recommendations: nursing facility, skilled  Discharge Equipment Recommendations:  none  Barriers to discharge:  None    Assessment:     Riley Saba is a 82 y.o. male with a medical diagnosis of Intraabdominal hemorrhage.  He presents with the following performance deficits affecting function are weakness, impaired endurance, impaired self care skills, impaired functional mobility, gait instability, impaired balance, impaired cognition, decreased coordination, decreased upper extremity function, decreased lower extremity function, decreased safety awareness, decreased ROM, impaired coordination, impaired cardiopulmonary response to activity.     Rehab Prognosis:  Fair; patient would benefit from acute skilled OT services to address these deficits and reach maximum level of function.       Plan:     Patient to be seen 2 x/week to address the above listed problems via self-care/home management, therapeutic activities, therapeutic exercises  · Plan of Care Expires: 08/13/22  · Plan of Care Reviewed with: patient, spouse    Subjective     Pain/Comfort:  · Pain Rating 1: 0/10    Objective:     Communicated with: nurse Beauchamp and [facility] chart review prior to session.  Patient found supine with mendes catheter, peripheral IV, telemetry, bed alarm upon OT entry to room.    General Precautions: Standard, aspiration, fall   Orthopedic Precautions:N/A   Braces: N/A  Respiratory Status: Room air     Occupational Performance:     Bed Mobility:    · Patient completed Rolling/Turning to Left with  maximal assistance and 2 persons  · Patient completed Rolling/Turning to Right with maximal assistance and 2 persons  · Patient completed Supine to Sit with minimum assistance and extended time  · Patient completed Sit to Supine with maximal assistance and 2 persons   · Pt performed  lateral scoot and supine scoot toward HOB with total A x2.     Functional Mobility/Transfers:  · Patient completed Sit <> Stand Transfer with moderate assistance and of 2 persons  with  rolling walker   · Functional Mobility: Pt ambulated 20 ft with mod-max A using RW. Pt requiring standing break due to briefs falling.  · Pt performed stand pivot to bed with Max A following ambulation.    Activities of Daily Living:  · Toileting: total assistance . Pt requiried cleaning and changing of brief.      [facility] 6 Click ADL: 11    Treatment & Education:  Patient educated on role of OT in acute setting and benefits of participation. Educated on techniques to use to increase independence and decrease fall risk with functional transfers. Pt with increased confusion today, difficulty following instructions. Pt with decreased coordination to push up from bed for RW; pt's wife reports he pulls himself up on RW. Pt with decreased balance during ambulation due to brief falling. Pt would not hold onto walker tightly and demonstrated a posterior lean; verbal cueing for safety with RW management. Pt BP dropped to 62/41 mmHg following ambulation. Transferring to chair not attempted due to decreased safety. Educated on importance of upright position for endurance and exercises to maintain strength and ROM.    Patient left with bed in chair position with all lines intact, call button in reach, bed alarm on, nurse notified and spouse presentEducation:      GOALS:   Multidisciplinary Problems     Occupational Therapy Goals        Problem: Occupational Therapy    Goal Priority Disciplines Outcome Interventions   Occupational Therapy Goal     OT, PT/OT Ongoing, Progressing    Description: O.T. GOALS TO BE MET BY 8-13-22  PT WILL TOLERATE 1 SET X 15 REPS A/AAROM EXERCISE  MIN A WITH BSC TRANSFERS  MIN A WITH SUPINE<>SIT TRANSFERS AND MAINTAIN FAIR+ STATIC SITTING BALANCE EOB                          Time Tracking:     OT Date of Treatment:  08/02/22  OT Start Time: 0955  OT Stop Time: 1035  OT Total Time (min): 40 min    Billable Minutes:Self Care/Home Management 15  Therapeutic Activity 25    OT/KIYA: OWEN Wu OT    8/2/2022

## 2022-08-02 NOTE — ASSESSMENT & PLAN NOTE
Blood culture negative growth to date   IVFs   CXR unrevealing  UA culture negative growth to date   IV rocephin empirically   Likely reactive     8/2/22:Worsening WBC. Temp 100.4F last night. Check Procalcitonin. Repeat CXR- showed worsening pleural effusions/bibasilar infiltrates. Repeat UA pending. Cont Rocephin, add Azithromycin.

## 2022-08-02 NOTE — PLAN OF CARE
Recommendation/Intervention: 8/2  1. Recommend to continue pureed diet as tolerated.   2. Recommend to consider Boost + TID with meals for increased energy and protein needs.   3. Encourage PO intake.    Maxine Mckeon RD,LDN

## 2022-08-02 NOTE — PLAN OF CARE
Pt BP 62/41 following ambulation 20ft with mod-max A and RW. Demonstrated decreased coordination. Pt confused with instructions and required verbal cueing. Demonstrated posterior lean and decreased balance during ambulation due to brief falling. Toileting with total A.    Pt performed supine>sit with min A and extended time, sit>stand with mod A x2 using RW, stand>sit with max A, sit>sup with max A x2, lateral and supine scooting with total A x2, and rolling L<>R with max A x2.     Continue OT POC.

## 2022-08-02 NOTE — ASSESSMENT & PLAN NOTE
Hold Eliquis due to possible bleed  Resume when appropriate    8/2/22: restart Eliquis and monitor

## 2022-08-02 NOTE — ASSESSMENT & PLAN NOTE
Supportive care   Altered mental status on admission improved to baseline  ST to eval and treat   f/u recs-pureed diet

## 2022-08-02 NOTE — PLAN OF CARE
Pt free from fall/injury/trauma  Skin intact with no evidence of breakdown  Disoriented X 3  Gramajo maintained, IV antibiotics given  Pt monitored on telemetry   Will continue to monitor

## 2022-08-02 NOTE — PLAN OF CARE
Pt oriented to self.  VSS.  Pt remained afebrile throughout this shift.   All meds administered per order.   Pt remained free of falls this shift.   Plan of care reviewed. Wife verbalizes understanding.   Pt moving/turning with assistance.  Patient NSR on monitor.   Bed low, side rails up x 2, wheels locked, call light in reach.   Patient instructed to call for assistance.  Will continue to monitor.

## 2022-08-02 NOTE — PT/OT/SLP PROGRESS
Physical Therapy  Treatment    Riley Saba   MRN: [id number]   Admitting Diagnosis: Intraabdominal hemorrhage    PT Received On: 08/02/22  PT Start Time: 0950     PT Stop Time: 1030    PT Total Time (min): 40 min       Billable Minutes:  Gait Training 10 and Therapeutic Activity 30    Treatment Type: Treatment  PT/PTA: PTA     PTA Visit Number: 1       General Precautions: Standard, aspiration, fall  Orthopedic Precautions: N/A   Braces: N/A  Respiratory Status: Room air    Spiritual, Cultural Beliefs, Holiness Practices, Values that Affect Care: no    Subjective:  Communicated with patient's nurse, Quynh, and completed Epic chart review prior to session.  Patient predominantly non verbal throughout session.     Pain/Comfort  Pain Rating 1: 0/10    Objective:   Patient found with: telemetry, bed alarm, mendes catheter, peripheral IV    Functional Mobility:  Supine > sit EOB: Min A (increased time to complete due to poor coordination and command following)    STS from EOB > RW: Mod A of 2 (patient unable to coordinate pushing up from the bed or split hand placement. Per wife, patient pulls himself up on RW)    10ft x2 trials w/ RW Mod-Max A  (brief fell at end of first trial and patient made several attempts to pick it up despite being instructed not to do so. This caused patient to demo a severe posterior lean which he was unable to correct despite consistent cues to do so; consistent VC for safety w/ RW mgmt)    Stand pivot T/F to EOB w/ RW: Max A     Planned to T/F patient to chair but patient's wife requested BP be assessed. BP found to be 62/41 mmHg. T/F not attempted due to safety concerns.    Lateral scoot towards HOB: Total A of 2    Sit > supine: Max A of 2    Rolling R/L to gabriella clean brief: Max A of 2    Supine scoot towards HOB: Total A of 2    Educated patient on importance of increased tolerance to upright position in order to promote CV endurance. Encouraged patient to utilize elevated HOB to achieve  simulated chair position until they are able to safely complete T/F. Encouraged patient to perform AROM TE to BLE throughout the day in order to prevent further loss of ROM and strength. Re enforced importance of utilizing call light to meet needs in room.     AM-PAC 6 CLICK MOBILITY  How much help from another person does this patient currently need?   1 = Unable, Total/Dependent Assistance  2 = A lot, Maximum/Moderate Assistance  3 = A little, Minimum/Contact Guard/Supervision  4 = None, Modified Holt/Independent    Turning over in bed (including adjusting bedclothes, sheets and blankets)?: 2  Sitting down on and standing up from a chair with arms (e.g., wheelchair, bedside commode, etc.): 2  Moving from lying on back to sitting on the side of the bed?: 2  Moving to and from a bed to a chair (including a wheelchair)?: 1 (UNSAFE TO ATTEMPT TODAY)  Need to walk in hospital room?: 2  Climbing 3-5 steps with a railing?: 1  Basic Mobility Total Score: 10    AM-PAC Raw Score CMS G-Code Modifier Level of Impairment Assistance   6 % Total / Unable   7 - 9 CM 80 - 100% Maximal Assist   10 - 14 CL 60 - 80% Moderate Assist   15 - 19 CK 40 - 60% Moderate Assist   20 - 22 CJ 20 - 40% Minimal Assist   23 CI 1-20% SBA / CGA   24 CH 0% Independent/ Mod I     Patient left with bed in chair position with call button in reach, bed alarm on and wife present.    Assessment:  Riley Saba is a 82 y.o. male with a medical diagnosis of Intraabdominal hemorrhage and presents with overall decline in functional mobility. Patient would continue to benefit from skilled PT to address functional limitations listed below in order to return to PLOF/decrease caregiver burden.    Rehab identified problem list/impairments: Rehab identified problem list/impairments: weakness, impaired endurance, impaired self care skills, impaired functional mobility, gait instability, impaired balance, impaired cognition, decreased coordination,  decreased upper extremity function, decreased lower extremity function, decreased safety awareness, decreased ROM, impaired cardiopulmonary response to activity    Rehab potential is fair.    Activity tolerance: Poor    Discharge recommendations: Discharge Facility/Level of Care Needs: nursing facility, skilled     Barriers to discharge:      Equipment recommendations: Equipment Needed After Discharge: none     GOALS:   Multidisciplinary Problems     Physical Therapy Goals        Problem: Physical Therapy    Goal Priority Disciplines Outcome Goal Variances Interventions   Physical Therapy Goal     PT, PT/OT                      PLAN:    Patient to be seen 3 x/week  to address the above listed problems via gait training, therapeutic activities, therapeutic exercises  Plan of Care expires: 08/13/22  Plan of Care reviewed with: patient, spouse         08/02/2022

## 2022-08-02 NOTE — ASSESSMENT & PLAN NOTE
CT renal study concerning for hemorrhage.   CTA abdomen shows: No evidence of ongoing arterial bleeding most notably within the left upper quadrant there is no evidence of contrast extravasation on arterial or delayed phase of imaging.   Dr. Wellington (General surgery) was consulted in the ED, recommends serial h/h. Hold anticoagulation, transfuse as needed. No role for surgical exploration at this time.     Transfuse Hb<7    8/2/22: H/H stable. Will restart Eliquis and monitor.

## 2022-08-02 NOTE — TELEPHONE ENCOUNTER
----- Message from Elizabeth Pope sent at 8/2/2022 12:09 PM CDT -----  Contact: PT    ----- Message -----  From: Ludmila Marsh MA  Sent: 8/2/2022  12:06 PM CDT  To: Elizabeth Pope    I think you forwarded this message to the wrong [facility]. We are located in [facility]. No one here has tried to contact this patient.  ----- Message -----  From: Elizabeth Pope  Sent: 8/2/2022  11:48 AM CDT  To: [facility] Staff        Who Called Mrs Lin    Does the patient know what this is regarding?:  appt 08/31/2022   Would the patient rather a call back or a response via [facility]?  Callback   Best Call Back Number:  [phone] (home)         Additional Information:  pt is at [facility] admitted

## 2022-08-02 NOTE — TELEPHONE ENCOUNTER
Spoke with patients wife and she didn't have any questions. She stated that cardiology was changing the patients eliquis to 5mg twice day and also confirmed the pts apt.

## 2022-08-02 NOTE — CONSULTS
[facility] - Telemetry ([facility])  Adult Nutrition  Consult Note    SUMMARY     Recommendations    Recommendation/Intervention:   1. Recommend to continue pureed diet as tolerated.   2. Recommend to consider Boost + TID with meals for increased energy and protein needs.   3. Encourage PO intake.    Goals:   1. Patient will increase PO intake to meet >50% of estimated energy needs by RD follow up.    Nutrition Goal Status: new    Communication of RD Recs:  (Plan of care;Sticky Note)    Assessment and Plan    Nutrition Problem  Inadequate oral intake     Related to (etiology):   Decreased PO intake     Signs and Symptoms (as evidenced by):   Lethargic, alzheimer's      Interventions/Recommendations (treatment strategy):  1. Recommend to continue pureed diet as tolerated.   2. Recommend to consider Boost + TID with meals for increased energy and protein needs.   3. Encourage PO intake    Nutrition Diagnosis Status:   New        Malnutrition Assessment                                       Reason for Assessment    Reason For Assessment: consult  Diagnosis:  (hemorrhage)  Relevant Medical History: HLD, alzheimers  Interdisciplinary Rounds: did not attend  General Information Comments:     8/2:RD consulted for decreased appetite. Patient is on a pureed diet. Patient has decreased PO intake.  Patient was sleeping upon visit. Patients wife was not in room. Labs reviewed. Patient has no edema noted. Patients last BM 8/2. NFPE was not appropriate at this time due to patient sleeping. Will continue to monitor.  Nutrition Discharge Planning: pending medical course    Nutrition Risk Screen    Nutrition Risk Screen: dysphagia or difficulty swallowing    Nutrition/Diet History    Patient Reported Diet/Restrictions/Preferences: general  Spiritual, Cultural Beliefs, Judaism Practices, Values that Affect Care: no  Food Allergies: NKFA  Factors Affecting Nutritional Intake: decreased appetite    Anthropometrics    Temp: 98.2 °F (36.8  "°C)  Height Method: Estimated  Height: 5' 11" (180.3 cm)  Height (inches): 71 in  Weight Method: Bed Scale  Weight: 72.7 kg (160 lb 4.4 oz)  Weight (lb): 160.28 lb  Ideal Body Weight (IBW), Male: 172 lb  % Ideal Body Weight, Male (lb): 93.19 %  BMI (Calculated): 22.4  BMI Grade: 18.5-24.9 - normal       Lab/Procedures/Meds  BMP  Lab Results   Component Value Date     (L) 07/31/2022    K 3.9 07/31/2022     07/31/2022    CO2 25 07/31/2022    BUN 19 07/31/2022    CREATININE 0.7 07/31/2022    CALCIUM 8.2 (L) 07/31/2022    ANIONGAP 8 07/31/2022    ESTGFRAFRICA >60 07/31/2022    EGFRNONAA >60 07/31/2022     Lab Results   Component Value Date     (L) 07/31/2022    K 3.9 07/31/2022     07/31/2022    CO2 25 07/31/2022     Lab Results   Component Value Date    LABPROT 11.6 07/14/2022    ALBUMIN 2.5 (L) 07/31/2022     Lab Results   Component Value Date    CALCIUM 8.2 (L) 07/31/2022    PHOS 3.0 07/17/2022       Pertinent Labs Reviewed: reviewed  Pertinent Medications Reviewed: reviewed  Scheduled Meds:   apixaban  5 mg Oral BID    atorvastatin  10 mg Oral Daily    cefTRIAXone (ROCEPHIN) IVPB  1 g Intravenous Q24H    docusate sodium  100 mg Oral Daily    finasteride  5 mg Oral Daily    folic acid  1 mg Oral Daily    metoprolol tartrate  25 mg Oral BID    midodrine  2.5 mg Oral TID WM    polyethylene glycol  17 g Oral Daily    sodium chloride 0.9%  10 mL Intravenous Q8H    tamsulosin  0.4 mg Oral QHS     Continuous Infusions:  PRN Meds:.sodium chloride 0.9%, acetaminophen, aluminum-magnesium hydroxide-simethicone, dextrose 10%, dextrose 10%, lactulose, melatonin, morphine, naloxone, ondansetron, oxyCODONE, promethazine, simethicone, sodium chloride 0.9%    Physical Findings/Assessment         Estimated/Assessed Needs    Weight Used For Calorie Calculations: 72.7 kg (160 lb 4.4 oz)  Energy Calorie Requirements (kcal): 1750 (mifflin x 1.2AF)  Energy Need Method: Andressa-[place] Jorgensen  Protein " Requirements: 58-72 (0.8-1.0g/kg, DESEAN)  Weight Used For Protein Calculations: 72.7 kg (160 lb 4.4 oz)  Fluid Requirements (mL): 1750  Estimated Fluid Requirement Method: RDA Method  RDA Method (mL): 1750         Nutrition Prescription Ordered    Current Diet Order: pureed    Evaluation of Received Nutrient/Fluid Intake    % Kcal Needs: 0-25%  % Protein Needs: 0-25%  Energy Calories Required: not meeting needs  Protein Required: not meeting needs  Fluid Required: not meeting needs  Tolerance: tolerating  % Intake of Estimated Energy Needs: 0 - 25 %  % Meal Intake: 0 - 25 %    Nutrition Risk    Level of Risk/Frequency of Follow-up: moderate       Monitor and Evaluation    Food and Nutrient Intake: energy intake, food and beverage intake  Food and Nutrient Adminstration: diet order  Knowledge/Beliefs/Attitudes: food and nutrition knowledge/skill, beliefs and attitudes  Physical Activity and Function: nutrition-related ADLs and IADLs  Anthropometric Measurements: weight, weight change, body mass index  Biochemical Data, Medical Tests and Procedures: electrolyte and renal panel, gastrointestinal profile, glucose/endocrine profile, inflammatory profile, lipid profile  Nutrition-Focused Physical Findings: overall appearance       Nutrition Follow-Up    RD Follow-up?: Yes   Maxine Mckeon RD,LDN

## 2022-08-02 NOTE — ASSESSMENT & PLAN NOTE
BPH  Urology consulted  Renal US reviewed, no hydronephrosis  Keep mendes and follow up outpatient with primary urologist

## 2022-08-03 VITALS
BODY MASS INDEX: 22.44 KG/M2 | DIASTOLIC BLOOD PRESSURE: 70 MMHG | WEIGHT: 160.25 LBS | HEIGHT: 71 IN | OXYGEN SATURATION: 97 % | HEART RATE: 88 BPM | SYSTOLIC BLOOD PRESSURE: 115 MMHG | RESPIRATION RATE: 20 BRPM | TEMPERATURE: 99 F

## 2022-08-03 LAB
ALBUMIN SERPL BCP-MCNC: 2.5 G/DL (ref 3.5–5.2)
ALP SERPL-CCNC: 74 U/L (ref 55–135)
ALT SERPL W/O P-5'-P-CCNC: 10 U/L (ref 10–44)
ANION GAP SERPL CALC-SCNC: 9 MMOL/L (ref 8–16)
AST SERPL-CCNC: 13 U/L (ref 10–40)
BASOPHILS # BLD AUTO: 0.07 K/UL (ref 0–0.2)
BASOPHILS NFR BLD: 0.5 % (ref 0–1.9)
BILIRUB SERPL-MCNC: 1 MG/DL (ref 0.1–1)
BUN SERPL-MCNC: 7 MG/DL (ref 8–23)
CALCIUM SERPL-MCNC: 8.4 MG/DL (ref 8.7–10.5)
CHLORIDE SERPL-SCNC: 98 MMOL/L (ref 95–110)
CO2 SERPL-SCNC: 28 MMOL/L (ref 23–29)
CREAT SERPL-MCNC: 0.6 MG/DL (ref 0.5–1.4)
DIFFERENTIAL METHOD: ABNORMAL
EOSINOPHIL # BLD AUTO: 0.3 K/UL (ref 0–0.5)
EOSINOPHIL NFR BLD: 2 % (ref 0–8)
ERYTHROCYTE [DISTWIDTH] IN BLOOD BY AUTOMATED COUNT: 14.3 % (ref 11.5–14.5)
EST. GFR  (NO RACE VARIABLE): >60 ML/MIN/1.73 M^2
GLUCOSE SERPL-MCNC: 98 MG/DL (ref 70–110)
HCT VFR BLD AUTO: 27 % (ref 40–54)
HGB BLD-MCNC: 8.8 G/DL (ref 14–18)
IMM GRANULOCYTES # BLD AUTO: 0.11 K/UL (ref 0–0.04)
IMM GRANULOCYTES NFR BLD AUTO: 0.8 % (ref 0–0.5)
LYMPHOCYTES # BLD AUTO: 1.3 K/UL (ref 1–4.8)
LYMPHOCYTES NFR BLD: 8.9 % (ref 18–48)
MCH RBC QN AUTO: 29.7 PG (ref 27–31)
MCHC RBC AUTO-ENTMCNC: 32.6 G/DL (ref 32–36)
MCV RBC AUTO: 91 FL (ref 82–98)
MONOCYTES # BLD AUTO: 1.4 K/UL (ref 0.3–1)
MONOCYTES NFR BLD: 9.3 % (ref 4–15)
NEUTROPHILS # BLD AUTO: 11.5 K/UL (ref 1.8–7.7)
NEUTROPHILS NFR BLD: 78.5 % (ref 38–73)
NRBC BLD-RTO: 0 /100 WBC
PLATELET # BLD AUTO: 230 K/UL (ref 150–450)
PMV BLD AUTO: 10.7 FL (ref 9.2–12.9)
POTASSIUM SERPL-SCNC: 3.3 MMOL/L (ref 3.5–5.1)
PROT SERPL-MCNC: 4.8 G/DL (ref 6–8.4)
RBC # BLD AUTO: 2.96 M/UL (ref 4.6–6.2)
SODIUM SERPL-SCNC: 135 MMOL/L (ref 136–145)
WBC # BLD AUTO: 14.66 K/UL (ref 3.9–12.7)

## 2022-08-03 PROCEDURE — 80053 COMPREHEN METABOLIC PANEL: CPT | Performed by: NURSE PRACTITIONER

## 2022-08-03 PROCEDURE — 85025 COMPLETE CBC W/AUTO DIFF WBC: CPT | Performed by: NURSE PRACTITIONER

## 2022-08-03 PROCEDURE — 25000003 PHARM REV CODE 250: Performed by: NURSE PRACTITIONER

## 2022-08-03 PROCEDURE — 63600175 PHARM REV CODE 636 W HCPCS: Performed by: NURSE PRACTITIONER

## 2022-08-03 PROCEDURE — 97530 THERAPEUTIC ACTIVITIES: CPT

## 2022-08-03 PROCEDURE — 97116 GAIT TRAINING THERAPY: CPT | Mod: CQ

## 2022-08-03 PROCEDURE — 36415 COLL VENOUS BLD VENIPUNCTURE: CPT | Performed by: NURSE PRACTITIONER

## 2022-08-03 PROCEDURE — 25000003 PHARM REV CODE 250: Performed by: STUDENT IN AN ORGANIZED HEALTH CARE EDUCATION/TRAINING PROGRAM

## 2022-08-03 PROCEDURE — 97530 THERAPEUTIC ACTIVITIES: CPT | Mod: CQ

## 2022-08-03 RX ORDER — CEFDINIR 300 MG/1
300 CAPSULE ORAL 2 TIMES DAILY
Qty: 10 CAPSULE | Refills: 0 | Status: SHIPPED | OUTPATIENT
Start: 2022-08-03 | End: 2022-08-03 | Stop reason: SDUPTHER

## 2022-08-03 RX ORDER — LACTULOSE 10 G/15ML
20 SOLUTION ORAL; RECTAL DAILY
Qty: 473 ML | Refills: 0
Start: 2022-08-03 | End: 2022-08-17

## 2022-08-03 RX ORDER — AZITHROMYCIN 250 MG/1
250 TABLET, FILM COATED ORAL DAILY
Qty: 5 TABLET | Refills: 0
Start: 2022-08-03

## 2022-08-03 RX ORDER — METOPROLOL TARTRATE 25 MG/1
25 TABLET, FILM COATED ORAL 2 TIMES DAILY
Start: 2022-08-03

## 2022-08-03 RX ORDER — TAMSULOSIN HYDROCHLORIDE 0.4 MG/1
0.4 CAPSULE ORAL DAILY
Start: 2022-08-03

## 2022-08-03 RX ORDER — CEFDINIR 300 MG/1
300 CAPSULE ORAL 2 TIMES DAILY
Qty: 10 CAPSULE | Refills: 0
Start: 2022-08-03 | End: 2022-08-08

## 2022-08-03 RX ADMIN — POLYETHYLENE GLYCOL 3350 17 G: 17 POWDER, FOR SOLUTION ORAL at 08:08

## 2022-08-03 RX ADMIN — FOLIC ACID 1 MG: 1 TABLET ORAL at 08:08

## 2022-08-03 RX ADMIN — ATORVASTATIN CALCIUM 10 MG: 10 TABLET, FILM COATED ORAL at 08:08

## 2022-08-03 RX ADMIN — MIDODRINE HYDROCHLORIDE 5 MG: 5 TABLET ORAL at 08:08

## 2022-08-03 RX ADMIN — FINASTERIDE 5 MG: 5 TABLET, FILM COATED ORAL at 08:08

## 2022-08-03 RX ADMIN — DOCUSATE SODIUM 100 MG: 100 CAPSULE, LIQUID FILLED ORAL at 08:08

## 2022-08-03 RX ADMIN — MIDODRINE HYDROCHLORIDE 5 MG: 5 TABLET ORAL at 12:08

## 2022-08-03 RX ADMIN — SODIUM CHLORIDE: 0.9 INJECTION, SOLUTION INTRAVENOUS at 12:08

## 2022-08-03 RX ADMIN — APIXABAN 5 MG: 2.5 TABLET, FILM COATED ORAL at 08:08

## 2022-08-03 RX ADMIN — CEFTRIAXONE 1 G: 1 INJECTION, SOLUTION INTRAVENOUS at 12:08

## 2022-08-03 RX ADMIN — METOPROLOL TARTRATE 25 MG: 25 TABLET, FILM COATED ORAL at 09:08

## 2022-08-03 NOTE — PLAN OF CARE
Pt requiring verbal cueing due to confusion. Performed sup<>sit with max A x2 today. Sit<>stand and ambulated 10 ft x2 reps with mod A x2 using RW. Required rest break due to BP. Demonstrated posterior leaning in standing, requiring cueing.  Continue OT POC.

## 2022-08-03 NOTE — NURSING
Report called [facility]. Patient IV removed. Tele monitor removed and returned to tele room. Pt transported to [facility] via [facility].

## 2022-08-03 NOTE — PLAN OF CARE
[facility] - Tele[facility] (Hospital)  Discharge Final Note    Primary Care Provider: Bj Clayton MD    Expected Discharge Date: 8/3/2022    Final Discharge Note (most recent)     Final Note - 08/03/22 1246        Final Note    Assessment Type Final Discharge Note     Anticipated Discharge Disposition Skilled Nursing Facility        Post-Acute Status    Post-Acute Authorization Placement     Post-Acute Placement Status Set-up Complete/Auth obtained   [facility]                Important Message from Medicare             Contact Info     Roque Camarena MD   Specialty: Urology    [facility]  [address] [place] [id number]   Phone: [phone]       Next Steps: Follow up in 1 week(s)    Instructions: CT scan showed 2.8 x 1.7 x 3.0 cm urinary bladder wall irregularity, Small lesion not excluded  urinary retention -mendes in place    Bj Clayton MD   Specialty: Internal Medicine   Relationship: PCP - General    [facility]  [address] [place] [id number]   Phone: [phone]       Next Steps: Follow up in 3 day(s)    Rafat Wellington MD   Specialty: General Surgery, Bariatrics    [address] [facility]  4th Floor  [facility] [address]   Phone: [phone]       Next Steps: Follow up in 2 week(s)    Instructions: for intrabadominal bleeding    [facility]   Specialty: Hematology and Oncology    [address]  [facility] [address]   Phone: [phone]       Next Steps: Follow up in 2 week(s)    Instructions: for acute PE

## 2022-08-03 NOTE — PLAN OF CARE
Patient transferring to [facility].  Discharge paperwork sent via [employer].    Please call report to [phone].  Need to set up ambulance transport.       08/03/22 1243   Post-Acute Status   Post-Acute Authorization Placement   Post-Acute Placement Status Set-up Complete/Auth obtained   Discharge Plan   Discharge Plan A Skilled Nursing Facility

## 2022-08-03 NOTE — PT/OT/SLP PROGRESS
Occupational Therapy   Treatment    Name: Riley Saba  MRN: [id number]  Admitting Diagnosis:  Intraabdominal hemorrhage       Recommendations:     Discharge Recommendations: nursing facility, skilled  Discharge Equipment Recommendations:  none  Barriers to discharge:  None    Assessment:     Riley Saba is a 82 y.o. male with a medical diagnosis of Intraabdominal hemorrhage.  He presents with the following performance deficits affecting function are weakness, impaired endurance, impaired self care skills, impaired functional mobility, gait instability, impaired balance, impaired cognition, decreased coordination, decreased upper extremity function, decreased lower extremity function, decreased safety awareness, decreased ROM, impaired coordination, impaired cardiopulmonary response to activity.     Rehab Prognosis:  Fair; patient would benefit from acute skilled OT services to address these deficits and reach maximum level of function.       Plan:     Patient to be seen 2 x/week to address the above listed problems via self-care/home management, therapeutic activities, therapeutic exercises  · Plan of Care Expires: 08/13/22  · Plan of Care Reviewed with: patient, spouse    Subjective     Pain/Comfort:  · Pain Rating 1: 0/10     Pt's wife stating pt has a fever.   Objective:     Communicated with: nurse Beauchamp and epic chart review prior to session.  Patient found HOB elevated with mendes catheter, peripheral IV, telemetry upon OT entry to room.    General Precautions: Standard, aspiration, fall   Orthopedic Precautions:N/A   Braces: N/A  Respiratory Status: Room air     Occupational Performance:     Bed Mobility:    · Patient completed Supine to Sit with maximal assistance, 2 persons and due to confusion and uncoordination  · Patient completed Sit to Supine with maximal assistance and 2 persons   · Pt performed forward scooting with min A and extended time.  · Pt performed supine scooting to HOB with SBA-CGA  with extended time and consistent verbal cueing.     Functional Mobility/Transfers:  · Patient completed Sit <> Stand Transfer with moderate assistance and of 2 persons  with  rolling walker    · Pt performed sit<>stand from bed and chair.  · Pt performed step pivot t/f to bed with mod A x2 using RW.  · Functional Mobility: Patient completed x10ft x2 reps functional mobility with mod A x2 to increase dynamic standing balance and activity tolerance needed for ADL completion.    Activities of Daily Living:  · Pt requires total A to gabriella socks and shoes.  · Pt doff shoes with Max A, able to partially doff R shoe but unable to doff L shoe.      [facility] 6 Click ADL: 11    Treatment & Education:  Pt demonstrating increased confusion with therapist instruction today and difficulty initiating movements. Pt required extended time and consistent verbal cueing throughout session. Pt /78 in sitting, 116/84 in standing using spouse's BP cuff. Pt requiring x1 rest break after ambulating 10 ft due to BP, pt's spouse bringing chair for pt to sit and rest. Pt and spouse educated on therapeutic exercises to perform throughout the day while in bed (shoulder flexion and elbow flexion). Pt requiring tactile cueing and demonstration for performing exercises. Pt verbalized understanding.  Pt's telemetry leads noted to be off, replaced by therapist.      Educated on techniques to use to increase independence and decrease fall risk with functional transfers. Educated on importance of OOB activity and calling for A to transfer back to bed. Encouraged completion of B UE AROM therex throughout the day to tolerance to increase functional strength and activity tolerance. Patient and spouse stated understanding and in agreement with POC.    Patient left with bed in chair position with all lines intact, call button in reach, nurse notified and wife presentEducation:      GOALS:   Multidisciplinary Problems     Occupational Therapy Goals         Problem: Occupational Therapy    Goal Priority Disciplines Outcome Interventions   Occupational Therapy Goal     OT, PT/OT Ongoing, Progressing    Description: O.T. GOALS TO BE MET BY 8-13-22  PT WILL TOLERATE 1 SET X 15 REPS A/AAROM EXERCISE  MIN A WITH BSC TRANSFERS  MIN A WITH SUPINE<>SIT TRANSFERS AND MAINTAIN FAIR+ STATIC SITTING BALANCE EOB                          Time Tracking:     OT Date of Treatment: 08/03/22  OT Start Time: 0915  OT Stop Time: 0945  OT Total Time (min): 30 min    Billable Minutes:Therapeutic Activity 30 minutes    OT/KIYA: OWEN Wu OT    8/3/2022

## 2022-08-03 NOTE — PT/OT/SLP PROGRESS
Physical Therapy Treatment    Patient Name:  Riley Saba   MRN:  [id number]    Recommendations:     Discharge Recommendations:  nursing facility, skilled   Discharge Equipment Recommendations: none   Barriers to discharge: None    Assessment:     Riley Saba is a 82 y.o. male admitted with a medical diagnosis of Intraabdominal hemorrhage.  He presents with the following impairments/functional limitations:  weakness, impaired endurance, impaired self care skills, gait instability, impaired balance, impaired cognition, decreased coordination, decreased lower extremity function, decreased safety awareness, decreased ROM, impaired coordination, impaired cardiopulmonary response to activity.    Pt tolerated treatment fairly well with fair participation and effort. Pt continues to require assistance with all activities, and is at risk for falls.    Rehab Prognosis: Fair; patient would benefit from acute skilled PT services to address these deficits and reach maximum level of function.    Recent Surgery: * No surgery found *      Plan:     During this hospitalization, patient to be seen 3 x/week to address the identified rehab impairments via gait training, therapeutic activities, therapeutic exercises and progress toward the following goals:    · Plan of Care Expires:  08/13/22    Subjective     Chief Complaint: none noted by pt  Patient/Family Comments/goals: agreeable to PT/OT treatment  Pain/Comfort:  · Pain Rating 1: 0/10  · Pain Rating Post-Intervention 1: 0/10      Objective:     Communicated with RNQuynh prior to session.  Patient found HOB elevated with mendes catheter, peripheral IV, telemetry upon PT entry to room.     General Precautions: Standard, aspiration, fall   Orthopedic Precautions:N/A   Braces: N/A  Respiratory Status: Room air     Functional Mobility:  · Bed Mobility:     · Rolling Right: maximal assistance  · Scooting anteriorly toward EOB to plant feet on floor: Max (A)  · Pt resistance  initially, progressed CGA   · Scooting toward HOB: stand by assistance and contact guard assistance  · Instructed for technique  · in'c time required  · Supine to Sit: maximal assistance of 2 persons  · Sit to Supine: moderate assistance of 2 persons  · Transfers:     · Sit <> Stand: moderate assistance of 2 persons with rolling walker  · 3 trials  · 2 from EOB, 1 from chair  · Pt with decreased anterior weight-shift and momentum to rise.  · Gait: Pt ambulates 10' x 2 thorughout the patient room  with mod (A) of 2 persons using the RW with chair f/u. Pt demo'd decreased step length, decreased raúl, and flexed postural. Cued for postural alignment, foot placement, and direction. Pt also instructed for safety and rest periods. Seated rest break provided t/o ambulation trial. Instability noted. Pt denies dizziness.  · Balance:   · Sitting: CGA   · Standing: Min-Mod (A)      AM-PAC 6 CLICK MOBILITY  Turning over in bed (including adjusting bedclothes, sheets and blankets)?: 2  Sitting down on and standing up from a chair with arms (e.g., wheelchair, bedside commode, etc.): 2  Moving from lying on back to sitting on the side of the bed?: 2  Need to walk in hospital room?: 2  Climbing 3-5 steps with a railing?: 1       Therapeutic Activities and Exercises:  Pt educated in the goals of the session, proper gait mechanics, energy conservation techniques, and PT POC.    Patient's BP within normal limits while taken sitting at EOB.  143/78 mmHg (after sup>sit)  116/84 mmHg (after 1st STS trial)    Patient left HOB elevated with all lines intact, call button in reach and spouse present..    GOALS:   Multidisciplinary Problems     Physical Therapy Goals        Problem: Physical Therapy    Goal Priority Disciplines Outcome Goal Variances Interventions   Physical Therapy Goal     PT, PT/OT                      Time Tracking:     PT Received On: 08/03/22  PT Start Time: 0910     PT Stop Time: 0945  PT Total Time (min): 35 min      Billable Minutes: Gait Training 15 and Therapeutic Activity 20    Treatment Type: Treatment  PT/PTA: PTA     PTA Visit Number: 2     08/03/2022

## 2022-08-04 LAB
BACTERIA BLD CULT: NORMAL
BACTERIA BLD CULT: NORMAL

## 2022-08-04 NOTE — DISCHARGE SUMMARY
[place] - Telemetry ([facility])  [facility] Medicine  Discharge Summary      Patient Name: Riley Saba  MRN: [id number]  Patient Class: IP- Inpatient  Admission Date: 7/29/2022  Hospital Length of Stay: 4 days  Discharge Date and Time: 8/3/2022  4:28 PM  Attending Physician: Dr. Monson  Discharging Provider: Annabel Cox NP  Primary Care Provider: Bj Clayton MD      HPI:   Riley Saba is a 82 y.o. male patient with a PMHx of HLD, supraventricular tachycardia, and alzheimer's disease who presents to the Emergency Department for evaluation of urinary retention which onset this AM. Pt has had a mendes catheter since June 6th. Pt's family member states there was urine output last night and when the catheter was changed this AM urine output stopped.  Pt drank 2 cups of water last PM and two cups of water this AM. Pt also has a history of prostate issues. No associated sxs reported. Spouse/pt denies: fever, chills, cough, SOB, abd pain, BLE edema, and all other sx at this time.  ED workup shows: WBC 21.9K, H/H 10.5/31.3, Na 132, Creatinine 2.3; CT renal study concerning for hemorrhage. Small bilateral pleural effusions with associated compressive atelectasis. Bladder wall thickening which may relate to infection, L obstruction, or neurogenic bladder. CTA abdomen shows: No evidence of ongoing arterial bleeding most notably within the left upper quadrant there is no evidence of contrast extravasation on arterial or delayed phase of imaging. Dr. Wellington (General surgery) was consulted in the ED, recommends serial h/h. Hold anticoagulation, transfuse as needed. No role for surgical exploration at this time.   He is a full code and his SDM is his wife  He will be kept on OBS for further evaluation under the care of [facility] medicine.             Hospital Course:   Admited for intraabdominal hemorrhage per CT. Subsequent cta showed no active arterial bleed. He was recently admitted for PE/DVT and started on  anticoagulation, held since admission. Continue to monitor Hb, mentation improving. Cr to baseline.General surgery evaluated- No surgical intervention at this time. +urinary retention. Mendes placed. Urology recommended cont mendes and f/u with Dr. Camarena.     8/1 hb/hct stable. Overnight experienced tachycardia, now sinus rhythm. +sleep disturbance. Surgery following with no indication for surgical intervention. Leukocytosis but afebrile. Blood culture negative growth to date.     8/2/22: H/H stable. Will restart Eliquis and monitor. Worsening WBC. Temp 100.4F last night. Check Procalcitonin. Repeat CXR- showed worsening pleural effusions/bibasilar infiltrates. Repeat UA pending. Cont Rocephin, add Azithromycin.     8/3/22: H/H stable on Eliquis. WBC trended down. Afebrile. Procalcitonin normal. Encouraged OOB and IS. The patient was accepted at the [facility]. He will be discharged to continue Cefdinir and Azithromycin. Mendes remained for urinary retention as advised by Urology. F/U with PCP in 3-5 days for repeat CBC, Heme/Onc for Acute PE, Urology for bladder lesion and urinary retention, and General surgery if any concern for continued intraabdominal bleeding.   The patient was seen and examined today and determined to be stable for discharge.         Goals of Care Treatment Preferences:  Code Status: Full Code      Consults:   Consults (From admission, onward)        Status Ordering Provider     Inpatient consult to Registered Dietitian/Nutritionist  Once        Provider:  (Not yet assigned)    Completed SU LAW     Inpatient consult to Social Work/Case Management  Once        Provider:  (Not yet assigned)    Completed SU LAW     Inpatient consult to Urology  Once        Provider:  (Not yet assigned)    Completed JANET IRWIN              Final Active Diagnoses:    Diagnosis Date Noted POA    PRINCIPAL PROBLEM:  Intraabdominal hemorrhage [R58] 07/31/2022 Yes    Leukocytosis [D72.829]  07/15/2022 Yes    Urinary retention [R33.9] 07/29/2022 Yes    Constipation [K59.00] 07/30/2022 Yes    Orthostatic hypotension [I95.1] 07/16/2022 Yes    Alzheimer's dementia [G30.9, F02.80] 07/14/2022 Yes    Essential hypertension [I10] 07/14/2022 Yes    SVT (supraventricular tachycardia) [I47.1] 07/14/2022 Yes    BPH (benign prostatic hyperplasia) [N40.0] 07/14/2022 Yes    Acute pulmonary embolism [I26.99] 07/14/2022 Yes      Problems Resolved During this Admission:    Diagnosis Date Noted Date Resolved POA    Abnormal CT scan [R93.89] 07/30/2022 07/31/2022 Yes    DESEAN (acute kidney injury) [N17.9] 07/29/2022 08/02/2022 Yes       Discharged Condition: stable    Disposition: Skilled Nursing Facility    Follow Up:   Follow-up Information     Roque Camarena MD Follow up in 1 week(s).    Specialty: Urology  Why: CT scan showed 2.8 x 1.7 x 3.0 cm urinary bladder wall irregularity, Small lesion not excluded  urinary retention -mendes in place  Contact information:  [address] [facility] [address] [phone]             Bj Clayton MD Follow up in 3 day(s).    Specialty: Internal Medicine  Why: repeat CBC in 3-5 days  Contact information:  [address] [facility] [address]  [phone]             Rafat Wellington MD Follow up in 2 week(s).    Specialties: General Surgery, Bariatrics  Why: for intrabadominal bleeding  Contact information:  [address] [facility]  [address]th [address]  [facility] [address] [phone]             [facility] HEMATOLOGY/ONCOLOGY Follow up in 2 week(s).    Specialty: Hematology and Oncology  Why: for acute PE  Contact information:  [address]  [facility] [address] [phone]                     Patient Instructions:      Ambulatory referral/consult to Hospice   Standing Status: Future   Referral Priority: Routine Referral Type: Consultation   Referral Reason: Specialty Services Required   Requested Specialty: Hospice and  Palliative Medicine   Number of Visits Requested: 1     Diet Dysphagia Pureed   Order Comments: Boost supplement three times daily     Activity as tolerated       Significant Diagnostic Studies:   Imaging Results          US Retroperitoneal Complete (Final result)  Result time 07/30/22 09:03:09    Final result by Fahad Montenegro MD (07/30/22 09:03:09)                 Impression:      No hydronephrosis.    Minimally elevated left renal resistive index may reflect acute medical renal disease.    2.8 x 1.7 x 3.0 cm urinary bladder wall irregularity.  Small lesion not excluded.  Consider further evaluation with direct evaluation versus CT urogram.    Prostatomegaly.    Small volume of ascites.      Electronically signed by: Fahad Montenegro  Date:    07/30/2022  Time:    09:03             Narrative:    EXAMINATION:  US RETROPERITONEAL COMPLETE    CLINICAL HISTORY:  urinary retention r/o obstruction;    TECHNIQUE:  Ultrasound of the kidneys and urinary bladder was performed including color flow and Doppler evaluation of the kidneys.    COMPARISON:  CTA abdomen and pelvis 07/29/2022    FINDINGS:  Right kidney: The right kidney measures 9.9 cm. No cortical thinning. No loss of corticomedullary distinction. Resistive index measures 0.68.  No mass. No renal stone. No hydronephrosis.    Left kidney: The left kidney measures 10.5 cm. No cortical thinning. No loss of corticomedullary distinction. Resistive index measures 0.71.  No mass. No renal stone. No hydronephrosis.    Splenic resistive index is 0.57.    The urinary bladder is decompressed around a Gramajo catheter.  Globular, echogenic focus within or along the urinary bladder wall measures 2.8 x 1.7 x 3.0 cm.    The prostate is identified and appears enlarged in size measuring up to 5.8 cm.    There is perihepatic and left lower quadrant ascites.                               X-Ray Chest AP Portable (Final result)  Result time 07/30/22 06:52:39    Final result by Fahad  MD Lan (07/30/22 06:52:39)                 Impression:      Persistent small left pleural effusion and improved right pleural effusion.  No new infiltrate.      Electronically signed by: Fahad Montenegro  Date:    07/30/2022  Time:    06:52             Narrative:    EXAMINATION:  XR CHEST AP PORTABLE    CLINICAL HISTORY:  r/o infection;    TECHNIQUE:  Single frontal view of the chest was performed.    COMPARISON:  CTA chest and chest radiograph 07/14/2022    FINDINGS:  Cardiac leads project over the chest.  Cardiomediastinal silhouette is within normal limits and unchanged.  Unchanged small left pleural effusion.  Improved right pleural effusion.  Six no new pulmonary infiltrate.  No pneumothorax.  Visualized osseous structures appear intact..                                CTA Abdomen and Pelvis (Final result)  Result time 07/29/22 21:17:11    Final result by Jonah Powers MD (07/29/22 21:17:11)                 Impression:      No evidence of ongoing arterial bleeding most notably within the left upper quadrant there is no evidence of contrast extravasation on arterial or delayed phase of imaging.    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    Exam are proximally stable allowing for differences in contrast administration in comparison to the prior noncontrast exam of the same date.    Details as above.    This report was flagged in Epic as abnormal.    All CT scans at this facility are performed  using dose modulation techniques as appropriate to performed exam including the following:  automated exposure control; adjustment of mA and/or kV according to the patients size (this includes techniques or standardized protocols for targeted exams where dose is matched to indication/reason for exam: i.e. extremities or head);  iterative reconstruction technique.      Electronically signed by: Jonha Powers  Date:    07/29/2022  Time:    21:17             Narrative:     EXAMINATION:  CTA ABDOMEN AND PELVIS    CLINICAL HISTORY:  GI bleed;    TECHNIQUE:  Low dose axial images, sagittal and coronal reformations were obtained from the lung bases to the pubic symphysis.  Contrast was administered.  100 mL Omnipaque 350 IV contrast was administered with images obtained before and after the administration of contrast in a CT angiography protocol.  3D/MIP reformats were generated.    COMPARISON:  Multiple priors.    FINDINGS:  Heart: Normal in size. No pericardial effusion.    Lung Bases: Small bilateral pleural effusions.    Liver: Normal in size and attenuation, with no focal hepatic lesions.    Gallbladder: Partially filled with sludge.    Bile Ducts: No evidence of dilated ducts.    Pancreas: No mass or peripancreatic fat stranding.    Spleen: Unremarkable.    Adrenals: No thickening or nodularity.    Kidneys/ Ureters: No hydronephrosis.  No obstructive uropathy.  Normal uptake and excretion of contrast.    Bladder: Bladder wall thickening stable from the prior exam.  Gramajo catheter appears in place within the bladder.    Reproductive organs: Moderate prostatomegaly.    GI Tract/Mesentery: No evidence of bowel obstruction or inflammation.  No evidence of ongoing GI bleeding.  Similar appearance of the large bowel.    Peritoneal Space: Stable appearance of the left upper quadrant predominant moderate to large volume of high attenuation free fluid concerning for hemorrhage.  No postcontrast enhancement throughout this area or pooling of contrast on delayed phase of imaging to suggest active bleeding.  Source of bleeding is not identified, and may be remote.  Moderate low-density perihepatic fluid stable.  No free air.    Retroperitoneum: No significant adenopathy.    Abdominal wall: Unremarkable.    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  Additional severe stenosis just distal to the area of poststenotic dilation such as on series 4, image 172.  SMA  appears patent.  MIL is patent.    Bones: No acute fracture.  Age expected degenerative changes.                                CT Renal Stone Study ABD Pelvis WO (Final result)  Result time 07/29/22 17:22:39    Final result by Jonah Powers MD (07/29/22 17:22:39)                 Impression:      Moderate to large volume of left greater than right high density peritoneal fluid concerning for hemorrhage.    Additionally there is a moderate volume of simple perihepatic abdominal free fluid.    Small bilateral pleural effusions with associated compressive atelectasis.    Moderate to severe dilation of the distal colon and rectum of unclear etiology. Correlation for stricture.    Gramajo catheter in place within the bladder. Bladder wall thickening which may relate to infection, L obstruction, or neurogenic bladder. Correlation is advised.    Findings discussed with Dr. Omer prior to dictation.    This report was flagged in Epic as abnormal.    All CT scans at this facility are performed  using dose modulation techniques as appropriate to performed exam including the following:  automated exposure control; adjustment of mA and/or kV according to the patients size (this includes techniques or standardized protocols for targeted exams where dose is matched to indication/reason for exam: i.e. extremities or head);  iterative reconstruction technique.      Electronically signed by: Jonah Powers  Date:    07/29/2022  Time:    17:22             Narrative:    EXAMINATION:  CT RENAL STONE STUDY ABD PELVIS WO    CLINICAL HISTORY:  anuria;    TECHNIQUE:  Low dose axial images, sagittal and coronal reformations were obtained from the lung bases to the pubic symphysis.  Contrast was not administered.    COMPARISON:  None    FINDINGS:  Heart: Normal in size. No pericardial effusion.    Lung Bases: Small bilateral pleural effusions with associated compressive atelectasis.    Liver: Slightly small size liver.  No focal  lesions.    Gallbladder: Gallbladder partially filled with sludge.  No findings to definitively suggest acute cholecystitis.    Bile Ducts: No evidence of dilated ducts.    Pancreas: No mass or peripancreatic fat stranding.    Spleen: Unremarkable.    Adrenals: Unremarkable.    Kidneys/ Ureters: No definite hydronephrosis.  No obstructive uropathy.  No nonobstructive nephrolithiasis.    Bladder: Gramajo catheter in place within the bladder.  Bladder wall thickening which may relate to infection, L obstruction, or neurogenic bladder.  Correlation is advised.    Reproductive organs: Moderate prostatomegaly.    GI Tract/Mesentery: Moderate to severe dilation of the distal colon and rectum of unclear etiology.  Correlation for stricture.  Otherwise the bowel appears grossly within normal limits.  Appendix is not well delineated.    Peritoneal Space: Moderate to large volume of left greater than right high density peritoneal fluid concerning for hemorrhage.  Additionally there is a moderate volume of simple perihepatic abdominal free fluid.    Retroperitoneum: No significant adenopathy.    Abdominal wall: Unremarkable.    Vasculature: No significant atherosclerosis or aneurysm.    Bones: No acute fracture.  Age expected degenerative change.  No definite fracture.                                Pending Diagnostic Studies:     None         Medications:  Reconciled Home Medications:      Medication List      START taking these medications    azithromycin 250 MG tablet  Commonly known as: Z-MIKE  Take 1 tablet (250 mg total) by mouth once daily. Take 2 tablets by mouth on day 1; Take 1 tablet by mouth on days 2-5     cefdinir 300 MG capsule  Commonly known as: OMNICEF  Take 1 capsule (300 mg total) by mouth 2 (two) times daily. for 5 days        CHANGE how you take these medications    ELIQUIS 5 mg Tab  Generic drug: apixaban  Two po twice daily x 3 days, then one po bid thereafter for maintenance blood thinner for blood  clots  What changed:   · how much to take  · how to take this  · when to take this  · additional instructions     metoprolol tartrate 25 MG tablet  Commonly known as: LOPRESSOR  Take 1 tablet (25 mg total) by mouth 2 (two) times daily.  What changed: how much to take        CONTINUE taking these medications    atorvastatin 10 MG tablet  Commonly known as: LIPITOR  Take 10 mg by mouth every evening.     docusate sodium 100 MG capsule  Commonly known as: COLACE  Take 100 mg by mouth 2 (two) times daily.     finasteride 5 mg tablet  Commonly known as: PROSCAR  Take 5 mg by mouth once daily.     folic acid 1 MG tablet  Commonly known as: FOLVITE  Take 1 mg by mouth once daily.     lactulose 10 gram/15 mL solution  Commonly known as: CHRONULAC  Take 30 mLs (20 g total) by mouth once daily. for 14 days     magnesium hydroxide 400 mg/5 ml 400 mg/5 mL Susp  Commonly known as: MILK OF MAGNESIA  Take 20 mLs by mouth daily as needed.     midodrine 5 MG Tab  Commonly known as: PROAMATINE  Take 1 tablet (5 mg total) by mouth 3 (three) times daily with meals.     pantoprazole 40 mg suspension  Commonly known as: PROTONIX  Take 40 mg by mouth once daily.     polyethylene glycol 17 gram/dose powder  Commonly known as: GLYCOLAX  Take 17 g by mouth once daily.     tamsulosin 0.4 mg Cap  Commonly known as: FLOMAX  Take 1 capsule (0.4 mg total) by mouth once daily.            Indwelling Lines/Drains at time of discharge:   Lines/Drains/Airways     Drain  Duration                Urethral Catheter 07/29/22 1747 Straight-tip 5 days                Time spent on the discharge of patient: 45 minutes         Annabel Cox NP  Department of Hospital Medicine  [facility] - Telemetry ([facility])

## 2022-08-22 ENCOUNTER — NURSE TRIAGE (OUTPATIENT)
Dept: ADMINISTRATIVE | Facility: CLINIC | Age: 82
End: 2022-08-22
Payer: MEDICARE

## 2022-08-22 NOTE — TELEPHONE ENCOUNTER
Spoke with Stacie ORTIZ):     Patient has a mendes catheter. Patient c/o hematuria. Advised per protocol to be seen in the office today. Patient debilitated. A home visit was scheduled with RR. Advised the patient to call back with any further questions or if symptoms worsen.      Reason for Disposition   Bloody or red-colored urine and prostate or bladder surgery > 3 days (72 hours) ago    Additional Information   Negative: Shock suspected (e.g., cold/pale/clammy skin, too weak to stand, low BP, rapid pulse)   Negative: Sounds like a life-threatening emergency to the triager   Negative: Catheter was accidentally pulled-out and bright red continuous bleeding   Negative: SEVERE abdominal pain   Negative: Fever > 100.4 F (38.0 C)   Negative: Catheter was accidentally pulled-out   Negative: Lower abdominal pain or distention   Negative: Patient sounds very sick or weak to the triager   Negative: Drinking very little and dehydration suspected (e.g., no urine > 12 hours, very dry mouth, very lightheaded)   Negative: Bleeding around catheter (e.g., from penis or female urethra)   Negative: Tea-colored or slightly red urine lasts > 24 hours that is not cleared by increasing fluid intake, and no recent prostate or bladder surgery   Negative: Cloudy urine lasts > 24 hours and not cleared by increasing fluid intake   Negative: Bloody or red-colored urine and no recent prostate or bladder surgery (Exception: brief episode and urine now clear)    Protocols used: URINARY CATHETER SYMPTOMS AND [id number]

## 2022-08-31 ENCOUNTER — LAB VISIT (OUTPATIENT)
Dept: LAB | Facility: HOSPITAL | Age: 82
End: 2022-08-31
Payer: MEDICARE

## 2022-08-31 ENCOUNTER — TELEPHONE (OUTPATIENT)
Dept: HEMATOLOGY/ONCOLOGY | Facility: CLINIC | Age: 82
End: 2022-08-31
Payer: MEDICARE

## 2022-08-31 DIAGNOSIS — D64.9 ANEMIA, UNSPECIFIED: ICD-10-CM

## 2022-08-31 LAB
ALBUMIN SERPL BCP-MCNC: 3.2 G/DL (ref 3.5–5.2)
ALP SERPL-CCNC: 76 U/L (ref 55–135)
ALT SERPL W/O P-5'-P-CCNC: 14 U/L (ref 10–44)
ANION GAP SERPL CALC-SCNC: 7 MMOL/L (ref 8–16)
AST SERPL-CCNC: 18 U/L (ref 10–40)
BASOPHILS # BLD AUTO: 0.08 K/UL (ref 0–0.2)
BASOPHILS NFR BLD: 0.7 % (ref 0–1.9)
BILIRUB SERPL-MCNC: 0.6 MG/DL (ref 0.1–1)
BUN SERPL-MCNC: 12 MG/DL (ref 8–23)
CALCIUM SERPL-MCNC: 8.9 MG/DL (ref 8.7–10.5)
CHLORIDE SERPL-SCNC: 98 MMOL/L (ref 95–110)
CO2 SERPL-SCNC: 26 MMOL/L (ref 23–29)
CREAT SERPL-MCNC: 0.6 MG/DL (ref 0.5–1.4)
DIFFERENTIAL METHOD: ABNORMAL
EOSINOPHIL # BLD AUTO: 0.4 K/UL (ref 0–0.5)
EOSINOPHIL NFR BLD: 3.1 % (ref 0–8)
ERYTHROCYTE [DISTWIDTH] IN BLOOD BY AUTOMATED COUNT: 14.7 % (ref 11.5–14.5)
EST. GFR  (NO RACE VARIABLE): >60 ML/MIN/1.73 M^2
GLUCOSE SERPL-MCNC: 73 MG/DL (ref 70–110)
HCT VFR BLD AUTO: 35.2 % (ref 40–54)
HGB BLD-MCNC: 11.7 G/DL (ref 14–18)
IMM GRANULOCYTES # BLD AUTO: 0.03 K/UL (ref 0–0.04)
IMM GRANULOCYTES NFR BLD AUTO: 0.3 % (ref 0–0.5)
LYMPHOCYTES # BLD AUTO: 1.3 K/UL (ref 1–4.8)
LYMPHOCYTES NFR BLD: 11.2 % (ref 18–48)
MCH RBC QN AUTO: 29.6 PG (ref 27–31)
MCHC RBC AUTO-ENTMCNC: 33.2 G/DL (ref 32–36)
MCV RBC AUTO: 89 FL (ref 82–98)
MONOCYTES # BLD AUTO: 0.9 K/UL (ref 0.3–1)
MONOCYTES NFR BLD: 7.6 % (ref 4–15)
NEUTROPHILS # BLD AUTO: 9 K/UL (ref 1.8–7.7)
NEUTROPHILS NFR BLD: 77.1 % (ref 38–73)
NRBC BLD-RTO: 0 /100 WBC
PLATELET # BLD AUTO: 219 K/UL (ref 150–450)
PMV BLD AUTO: 10.6 FL (ref 9.2–12.9)
POTASSIUM SERPL-SCNC: 4.4 MMOL/L (ref 3.5–5.1)
PROT SERPL-MCNC: 5.4 G/DL (ref 6–8.4)
RBC # BLD AUTO: 3.95 M/UL (ref 4.6–6.2)
SODIUM SERPL-SCNC: 131 MMOL/L (ref 136–145)
WBC # BLD AUTO: 11.69 K/UL (ref 3.9–12.7)

## 2022-08-31 PROCEDURE — 80053 COMPREHEN METABOLIC PANEL: CPT | Performed by: INTERNAL MEDICINE

## 2022-08-31 PROCEDURE — 84466 ASSAY OF TRANSFERRIN: CPT | Performed by: INTERNAL MEDICINE

## 2022-08-31 PROCEDURE — 85025 COMPLETE CBC W/AUTO DIFF WBC: CPT | Performed by: INTERNAL MEDICINE

## 2022-08-31 PROCEDURE — 82728 ASSAY OF FERRITIN: CPT | Performed by: INTERNAL MEDICINE

## 2022-08-31 NOTE — TELEPHONE ENCOUNTER
----- Message from Elizabeth Pope sent at 8/31/2022  7:52 AM CDT -----  Contact: PT wife  .Type:  Needs Medical Advice    Who Called:  Mrs. Saba   Symptoms (please be specific): pt too weak to move/   How long has patient had these symptoms:    Pharmacy name and phone #:    Would the patient rather a call back or a response via [facility]? callback  Best Call Back Number:  [phone] (home)        Additional Information:  would like a virtual appt today after 1 pm/ [email or web address] will draw pt labs at 1 pm today

## 2022-09-01 LAB
FERRITIN SERPL-MCNC: 426 NG/ML (ref 20–300)
IRON SERPL-MCNC: 46 UG/DL (ref 45–160)
SATURATED IRON: 14 % (ref 20–50)
TOTAL IRON BINDING CAPACITY: 340 UG/DL (ref 250–450)
TRANSFERRIN SERPL-MCNC: 230 MG/DL (ref 200–375)

## 2022-09-07 ENCOUNTER — LAB VISIT (OUTPATIENT)
Dept: LAB | Facility: HOSPITAL | Age: 82
End: 2022-09-07
Attending: INTERNAL MEDICINE
Payer: MEDICARE

## 2022-09-07 ENCOUNTER — OFFICE VISIT (OUTPATIENT)
Dept: HEMATOLOGY/ONCOLOGY | Facility: CLINIC | Age: 82
End: 2022-09-07
Payer: MEDICARE

## 2022-09-07 ENCOUNTER — TELEPHONE (OUTPATIENT)
Dept: HEMATOLOGY/ONCOLOGY | Facility: CLINIC | Age: 82
End: 2022-09-07
Payer: MEDICARE

## 2022-09-07 ENCOUNTER — PATIENT MESSAGE (OUTPATIENT)
Dept: HEMATOLOGY/ONCOLOGY | Facility: CLINIC | Age: 82
End: 2022-09-07
Payer: MEDICARE

## 2022-09-07 VITALS
HEART RATE: 81 BPM | BODY MASS INDEX: 21.15 KG/M2 | HEIGHT: 73 IN | OXYGEN SATURATION: 98 % | SYSTOLIC BLOOD PRESSURE: 108 MMHG | TEMPERATURE: 98 F | DIASTOLIC BLOOD PRESSURE: 75 MMHG

## 2022-09-07 DIAGNOSIS — I26.99 ACUTE PULMONARY EMBOLISM, UNSPECIFIED PULMONARY EMBOLISM TYPE, UNSPECIFIED WHETHER ACUTE COR PULMONALE PRESENT: Primary | ICD-10-CM

## 2022-09-07 DIAGNOSIS — I82.413 ACUTE DEEP VEIN THROMBOSIS (DVT) OF FEMORAL VEIN OF BOTH LOWER EXTREMITIES: ICD-10-CM

## 2022-09-07 DIAGNOSIS — I26.99 ACUTE PULMONARY EMBOLISM, UNSPECIFIED PULMONARY EMBOLISM TYPE, UNSPECIFIED WHETHER ACUTE COR PULMONALE PRESENT: ICD-10-CM

## 2022-09-07 DIAGNOSIS — R58 INTRAABDOMINAL HEMORRHAGE: ICD-10-CM

## 2022-09-07 LAB — D DIMER PPP IA.FEU-MCNC: 1.04 MG/L FEU

## 2022-09-07 PROCEDURE — 85379 FIBRIN DEGRADATION QUANT: CPT | Performed by: INTERNAL MEDICINE

## 2022-09-07 PROCEDURE — 99214 OFFICE O/P EST MOD 30 MIN: CPT | Mod: PBBFAC | Performed by: INTERNAL MEDICINE

## 2022-09-07 PROCEDURE — 99214 PR OFFICE/OUTPT VISIT, EST, LEVL IV, 30-39 MIN: ICD-10-PCS | Mod: S$PBB,,, | Performed by: INTERNAL MEDICINE

## 2022-09-07 PROCEDURE — 36415 COLL VENOUS BLD VENIPUNCTURE: CPT | Performed by: INTERNAL MEDICINE

## 2022-09-07 PROCEDURE — 99999 PR PBB SHADOW E&M-EST. PATIENT-LVL IV: ICD-10-PCS | Mod: PBBFAC,,, | Performed by: INTERNAL MEDICINE

## 2022-09-07 PROCEDURE — 99999 PR PBB SHADOW E&M-EST. PATIENT-LVL IV: CPT | Mod: PBBFAC,,, | Performed by: INTERNAL MEDICINE

## 2022-09-07 PROCEDURE — 99214 OFFICE O/P EST MOD 30 MIN: CPT | Mod: S$PBB,,, | Performed by: INTERNAL MEDICINE

## 2022-09-07 RX ORDER — MIDODRINE HYDROCHLORIDE 5 MG/1
5 TABLET ORAL
COMMUNITY
Start: 2022-07-18

## 2022-09-07 NOTE — TELEPHONE ENCOUNTER
"Dietary Guidelines to Help Prevent Kidney Stones  Kidney stones are deposits of minerals and salts that form inside your kidneys. Your risk of developing kidney stones may be greater depending on your diet, your lifestyle, the medicines you take, and whether you have certain medical conditions. Most people can reduce their chances of developing kidney stones by following the instructions below. Depending on your overall health and the type of kidney stones you tend to develop, your dietitian may give you more specific instructions.  What are tips for following this plan?  Reading food labels  · Choose foods with \"no salt added\" or \"low-salt\" labels. Limit your sodium intake to less than 1500 mg per day.  · Choose foods with calcium for each meal and snack. Try to eat about 300 mg of calcium at each meal. Foods that contain 200-500 mg of calcium per serving include:  ? 8 oz (237 ml) of milk, fortified nondairy milk, and fortified fruit juice.  ? 8 oz (237 ml) of kefir, yogurt, and soy yogurt.  ? 4 oz (118 ml) of tofu.  ? 1 oz of cheese.  ? 1 cup (300 g) of dried figs.  ? 1 cup (91 g) of cooked broccoli.  ? 1-3 oz can of sardines or mackerel.  · Most people need 1000 to 1500 mg of calcium each day. Talk to your dietitian about how much calcium is recommended for you.  Shopping  · Buy plenty of fresh fruits and vegetables. Most people do not need to avoid fruits and vegetables, even if they contain nutrients that may contribute to kidney stones.  · When shopping for convenience foods, choose:  ? Whole pieces of fruit.  ? Premade salads with dressing on the side.  ? Low-fat fruit and yogurt smoothies.  · Avoid buying frozen meals or prepared deli foods.  · Look for foods with live cultures, such as yogurt and kefir.  Cooking  · Do not add salt to food when cooking. Place a salt shaker on the table and allow each person to add his or her own salt to taste.  · Use vegetable protein, such as beans, textured vegetable " Left message on voicemail concerning missed appointment with Dr. Gill today.    protein (TVP), or tofu instead of meat in pasta, casseroles, and soups.  Meal planning    · Eat less salt, if told by your dietitian. To do this:  ? Avoid eating processed or premade food.  ? Avoid eating fast food.  · Eat less animal protein, including cheese, meat, poultry, or fish, if told by your dietitian. To do this:  ? Limit the number of times you have meat, poultry, fish, or cheese each week. Eat a diet free of meat at least 2 days a week.  ? Eat only one serving each day of meat, poultry, fish, or seafood.  ? When you prepare animal protein, cut pieces into small portion sizes. For most meat and fish, one serving is about the size of one deck of cards.  · Eat at least 5 servings of fresh fruits and vegetables each day. To do this:  ? Keep fruits and vegetables on hand for snacks.  ? Eat 1 piece of fruit or a handful of berries with breakfast.  ? Have a salad and fruit at lunch.  ? Have two kinds of vegetables at dinner.  · Limit foods that are high in a substance called oxalate. These include:  ? Spinach.  ? Rhubarb.  ? Beets.  ? Potato chips and french fries.  ? Nuts.  · If you regularly take a diuretic medicine, make sure to eat at least 1-2 fruits or vegetables high in potassium each day. These include:  ? Avocado.  ? Banana.  ? Orange, prune, carrot, or tomato juice.  ? Baked potato.  ? Cabbage.  ? Beans and split peas.  General instructions    · Drink enough fluid to keep your urine clear or pale yellow. This is the most important thing you can do.  · Talk to your health care provider and dietitian about taking daily supplements. Depending on your health and the cause of your kidney stones, you may be advised:  ? Not to take supplements with vitamin C.  ? To take a calcium supplement.  ? To take a daily probiotic supplement.  ? To take other supplements such as magnesium, fish oil, or vitamin B6.  · Take all medicines and supplements as told by your health care provider.  · Limit alcohol intake to no  more than 1 drink a day for nonpregnant women and 2 drinks a day for men. One drink equals 12 oz of beer, 5 oz of wine, or 1½ oz of hard liquor.  · Lose weight if told by your health care provider. Work with your dietitian to find strategies and an eating plan that works best for you.  What foods are not recommended?  Limit your intake of the following foods, or as told by your dietitian. Talk to your dietitian about specific foods you should avoid based on the type of kidney stones and your overall health.  Grains  Breads. Bagels. Rolls. Baked goods. Salted crackers. Cereal. Pasta.  Vegetables  Spinach. Rhubarb. Beets. Canned vegetables. Pickles. Olives.  Meats and other protein foods  Nuts. Nut butters. Large portions of meat, poultry, or fish. Salted or cured meats. Deli meats. Hot dogs. Sausages.  Dairy  Cheese.  Beverages  Regular soft drinks. Regular vegetable juice.  Seasonings and other foods  Seasoning blends with salt. Salad dressings. Canned soups. Soy sauce. Ketchup. Barbecue sauce. Canned pasta sauce. Casseroles. Pizza. Lasagna. Frozen meals. Potato chips. French fries.  Summary  · You can reduce your risk of kidney stones by making changes to your diet.  · The most important thing you can do is drink enough fluid. You should drink enough fluid to keep your urine clear or pale yellow.  · Ask your health care provider or dietitian how much protein from animal sources you should eat each day, and also how much salt and calcium you should have each day.  This information is not intended to replace advice given to you by your health care provider. Make sure you discuss any questions you have with your health care provider.  Document Released: 04/13/2012 Document Revised: 04/08/2020 Document Reviewed: 11/28/2017  [employer] Patient Education © 2020 [employer]    Discussed a kidney stone prevention diet to include increasing p.o. fluid intake, to at least 1 to 2 L of water daily.  She is to avoid caffeine  products such as cola, coffee, and to avoid soft or soda drinks.  She is to decrease her sodium consumption as in  Fast foods, torres, salted nuts, canned foods, and smoked/cured foods. She is also to decrease her oxalate consumption, as in spinach, Nery greens, and Rhubarb.  Also important is to decrease protein intake, as in red meats, peanut butter, and also avoid nuts.    Urinary Frequency, Adult  Urinary frequency means urinating more often than usual. You may urinate every 1-2 hours even though you drink a normal amount of fluid and do not have a bladder infection or condition. Although you urinate more often than normal, the total amount of urine produced in a day is normal.  With urinary frequency, you may have an urgent need to urinate often. The stress and anxiety of needing to find a bathroom quickly can make this urge worse. This condition may go away on its own or you may need treatment at home. Home treatment may include bladder training, exercises, taking medicines, or making changes to your diet.  Follow these instructions at home:  Bladder health    · Keep a bladder diary if told by your health care provider. Keep track of:  ? What you eat and drink.  ? How often you urinate.  ? How much you urinate.  · Follow a bladder training program if told by your health care provider. This may include:  ? Learning to delay going to the bathroom.  ? Double urinating (voiding). This helps if you are not completely emptying your bladder.  ? Scheduled voiding.  · Do Kegel exercises as told by your health care provider. Kegel exercises strengthen the muscles that help control urination, which may help the condition.  Eating and drinking  · If told by your health care provider, make diet changes, such as:  ? Avoiding caffeine.  ? Drinking fewer fluids, especially alcohol.  ? Not drinking in the evening.  ? Avoiding foods or drinks that may irritate the bladder. These include coffee, tea, soda, artificial  sweeteners, citrus, tomato-based foods, and chocolate.  ? Eating foods that help prevent or ease constipation. Constipation can make this condition worse. Your health care provider may recommend that you:  § Drink enough fluid to keep your urine pale yellow.  § Take over-the-counter or prescription medicines.  § Eat foods that are high in fiber, such as beans, whole grains, and fresh fruits and vegetables.  § Limit foods that are high in fat and processed sugars, such as fried or sweet foods.  General instructions  · Take over-the-counter and prescription medicines only as told by your health care provider.  · Keep all follow-up visits as told by your health care provider. This is important.  Contact a health care provider if:  · You start urinating more often.  · You feel pain or irritation when you urinate.  · You notice blood in your urine.  · Your urine looks cloudy.  · You develop a fever.  · You begin vomiting.  Get help right away if:  · You are unable to urinate.  Summary  · Urinary frequency means urinating more often than usual. With urinary frequency, you may urinate every 1-2 hours even though you drink a normal amount of fluid and do not have a bladder infection or other bladder condition.  · Your health care provider may recommend that you keep a bladder diary, follow a bladder training program, or make dietary changes.  · If told by your health care provider, do Kegel exercises to strengthen the muscles that help control urination.  · Take over-the-counter and prescription medicines only as told by your health care provider.  · Contact a health care provider if your symptoms do not improve or get worse.  This information is not intended to replace advice given to you by your health care provider. Make sure you discuss any questions you have with your health care provider.  Document Released: 10/14/2010 Document Revised: 06/27/2019 Document Reviewed: 06/27/2019  [employer] Patient Education © 2020  [employer]

## 2022-09-07 NOTE — PROGRESS NOTES
Subjective:       Patient ID: Riley Saba is a 82 y.o. male.    Chief Complaint: Results and Coagulation Disorder    HPI 82-year-old male severe Alzheimer's disease patient had unprovoked pulmonary embolus and DVT lower extremity.  Patient presents at 7 weeks continuing on Eliquis 5 mg p.o. b.i.d. patient had recent complication with intra-abdominal hemorrhage patient is here for review    Past Medical History:   Diagnosis Date    Alzheimer's disease, unspecified (CODE)     BPH without obstruction/lower urinary tract symptoms     Constipation     HLD (hyperlipidemia)     Orthostatic hypotension     Supraventricular tachycardia      History reviewed. No pertinent family history.  Social History     Socioeconomic History    Marital status:    Tobacco Use    Smoking status: Never    Smokeless tobacco: Never     History reviewed. No pertinent surgical history.    Labs:  Lab Results   Component Value Date    WBC 11.69 08/31/2022    HGB 11.7 (L) 08/31/2022    HCT 35.2 (L) 08/31/2022    MCV 89 08/31/2022     08/31/2022     BMP  Lab Results   Component Value Date     (L) 08/31/2022    K 4.4 08/31/2022    CL 98 08/31/2022    CO2 26 08/31/2022    BUN 12 08/31/2022    CREATININE 0.6 08/31/2022    CALCIUM 8.9 08/31/2022    ANIONGAP 7 (L) 08/31/2022    ESTGFRAFRICA >60 07/31/2022    EGFRNONAA >60 07/31/2022     Lab Results   Component Value Date    ALT 14 08/31/2022    AST 18 08/31/2022    ALKPHOS 76 08/31/2022    BILITOT 0.6 08/31/2022       Lab Results   Component Value Date    IRON 46 08/31/2022    TIBC 340 08/31/2022    FERRITIN 426 (H) 08/31/2022     No results found for: [id number]  No results found for: FOLATE  No results found for: TSH      Review of Systems   Musculoskeletal:  Positive for gait problem.   Psychiatric/Behavioral:  Positive for confusion and dysphoric mood. The patient is nervous/anxious.      Objective:      Physical Exam  Constitutional:       Appearance: He is ill-appearing.    Neurological:      Motor: Weakness present.      Coordination: Coordination abnormal.      Gait: Gait abnormal.           Assessment:      1. Acute pulmonary embolism, unspecified pulmonary embolism type, unspecified whether acute cor pulmonale present    2. Acute deep vein thrombosis (DVT) of femoral vein of both lower extremities    3. Intraabdominal hemorrhage           Plan:     Reviewed information with family will check D-dimer today.  At this time if low would maybe consider discontinuation since patient had complication understand the risk benefit ratio would like to treat minimum of 3 months.  At this time will have follow-up in 4-6 weeks if D-dimer remains elevated to see lowest possible dose of treatment.  Discussed implications of answered questions with family        Ahmet Gill Jr, MD FACP

## 2022-10-05 DIAGNOSIS — I26.99 ACUTE PULMONARY EMBOLISM, UNSPECIFIED PULMONARY EMBOLISM TYPE, UNSPECIFIED WHETHER ACUTE COR PULMONALE PRESENT: Primary | ICD-10-CM

## 2022-10-05 NOTE — TELEPHONE ENCOUNTER
----- Message from Seamus Boo sent at 10/5/2022  2:56 PM CDT -----  Contact: Serial (wife)  Serial would like a call back at [phone], in regards to discussing pt Eliquis  medication. She states she was told it would be sent to the pharmacy when he was seen for his last appointment. Pt preferred pharmacy is   [id number] - [place] - [address] AT [facility] [address]  [address] [id number]  Phone: [phone] Fax: [phone]

## 2022-10-07 ENCOUNTER — TELEPHONE (OUTPATIENT)
Dept: ADMINISTRATIVE | Facility: CLINIC | Age: 82
End: 2022-10-07
Payer: MEDICARE

## 2022-10-07 NOTE — TELEPHONE ENCOUNTER
Returned call to [facility] with home health. Order given for PT eval to determine if hospital bed and wheelchair are medically necessary.

## 2022-10-12 ENCOUNTER — DOCUMENT SCAN (OUTPATIENT)
Dept: HOME HEALTH SERVICES | Facility: HOSPITAL | Age: 82
End: 2022-10-12
Payer: MEDICARE

## 2022-11-09 ENCOUNTER — TELEPHONE (OUTPATIENT)
Dept: HEMATOLOGY/ONCOLOGY | Facility: CLINIC | Age: 82
End: 2022-11-09
Payer: MEDICARE

## 2022-11-09 NOTE — TELEPHONE ENCOUNTER
Called pt's wife regarding questions and concerns about appt. Was able to answer any questions and get the appt rescheduled.

## 2022-11-18 ENCOUNTER — LAB VISIT (OUTPATIENT)
Dept: LAB | Facility: HOSPITAL | Age: 82
End: 2022-11-18
Attending: INTERNAL MEDICINE
Payer: MEDICARE

## 2022-11-18 DIAGNOSIS — I26.99 ACUTE PULMONARY EMBOLISM, UNSPECIFIED PULMONARY EMBOLISM TYPE, UNSPECIFIED WHETHER ACUTE COR PULMONALE PRESENT: ICD-10-CM

## 2022-11-18 LAB — D DIMER PPP IA.FEU-MCNC: 0.43 MG/L FEU

## 2022-11-18 PROCEDURE — 85379 FIBRIN DEGRADATION QUANT: CPT | Performed by: INTERNAL MEDICINE

## 2022-11-18 PROCEDURE — 36415 COLL VENOUS BLD VENIPUNCTURE: CPT | Performed by: INTERNAL MEDICINE

## 2022-11-22 ENCOUNTER — OFFICE VISIT (OUTPATIENT)
Dept: HEMATOLOGY/ONCOLOGY | Facility: CLINIC | Age: 82
End: 2022-11-22
Payer: MEDICARE

## 2022-11-22 VITALS
OXYGEN SATURATION: 99 % | BODY MASS INDEX: 19.58 KG/M2 | DIASTOLIC BLOOD PRESSURE: 80 MMHG | SYSTOLIC BLOOD PRESSURE: 122 MMHG | WEIGHT: 152.56 LBS | RESPIRATION RATE: 20 BRPM | HEIGHT: 74 IN | HEART RATE: 75 BPM | TEMPERATURE: 97 F

## 2022-11-22 DIAGNOSIS — I82.4Z3 ACUTE DEEP VEIN THROMBOSIS (DVT) OF DISTAL VEIN OF BOTH LOWER EXTREMITIES: ICD-10-CM

## 2022-11-22 DIAGNOSIS — I26.99 ACUTE PULMONARY EMBOLISM, UNSPECIFIED PULMONARY EMBOLISM TYPE, UNSPECIFIED WHETHER ACUTE COR PULMONALE PRESENT: Primary | ICD-10-CM

## 2022-11-22 PROCEDURE — 99214 PR OFFICE/OUTPT VISIT, EST, LEVL IV, 30-39 MIN: ICD-10-PCS | Mod: S$PBB,,, | Performed by: INTERNAL MEDICINE

## 2022-11-22 PROCEDURE — 99214 OFFICE O/P EST MOD 30 MIN: CPT | Mod: S$PBB,,, | Performed by: INTERNAL MEDICINE

## 2022-11-22 PROCEDURE — 99999 PR PBB SHADOW E&M-EST. PATIENT-LVL IV: CPT | Mod: PBBFAC,,, | Performed by: INTERNAL MEDICINE

## 2022-11-22 PROCEDURE — 99214 OFFICE O/P EST MOD 30 MIN: CPT | Mod: PBBFAC | Performed by: INTERNAL MEDICINE

## 2022-11-22 PROCEDURE — 99999 PR PBB SHADOW E&M-EST. PATIENT-LVL IV: ICD-10-PCS | Mod: PBBFAC,,, | Performed by: INTERNAL MEDICINE

## 2022-11-22 NOTE — PROGRESS NOTES
Subjective:       Patient ID: Riley Saba is a 82 y.o. male.    Chief Complaint: Results and Coagulation Disorder    HPI:  82-year-old male history of unprovoked DVT lower extremity pulmonary embolus.  Patient has completed approximately 4 months of full-dose anticoagulation with Eliquis 5 mg p.o. b.i.d. patient is here to discuss with us results of recent D-dimer    Past Medical History:   Diagnosis Date    Alzheimer's disease, unspecified (CODE)     BPH without obstruction/lower urinary tract symptoms     Constipation     HLD (hyperlipidemia)     Orthostatic hypotension     Supraventricular tachycardia      History reviewed. No pertinent family history.  Social History     Socioeconomic History    Marital status:    Tobacco Use    Smoking status: Never    Smokeless tobacco: Never     History reviewed. No pertinent surgical history.    Labs:  Lab Results   Component Value Date    WBC 11.69 08/31/2022    HGB 11.7 (L) 08/31/2022    HCT 35.2 (L) 08/31/2022    MCV 89 08/31/2022     08/31/2022     BMP  Lab Results   Component Value Date     (L) 08/31/2022    K 4.4 08/31/2022    CL 98 08/31/2022    CO2 26 08/31/2022    BUN 12 08/31/2022    CREATININE 0.6 08/31/2022    CALCIUM 8.9 08/31/2022    ANIONGAP 7 (L) 08/31/2022    ESTGFRAFRICA >60 07/31/2022    EGFRNONAA >60 07/31/2022     Lab Results   Component Value Date    ALT 14 08/31/2022    AST 18 08/31/2022    ALKPHOS 76 08/31/2022    BILITOT 0.6 08/31/2022       Lab Results   Component Value Date    IRON 46 08/31/2022    TIBC 340 08/31/2022    FERRITIN 426 (H) 08/31/2022     No results found for: [id number]  No results found for: FOLATE  No results found for: TSH      Review of Systems   Constitutional:  Negative for activity change, appetite change, chills, diaphoresis, fatigue, fever and unexpected weight change.   HENT:  Negative for congestion, dental problem, drooling, ear discharge, ear pain, facial swelling, hearing loss, mouth sores,  nosebleeds, postnasal drip, rhinorrhea, sinus pressure, sneezing, sore throat, tinnitus, trouble swallowing and voice change.    Eyes:  Negative for photophobia, pain, discharge, redness, itching and visual disturbance.   Respiratory:  Negative for apnea, cough, choking, chest tightness, shortness of breath, wheezing and stridor.    Cardiovascular:  Negative for chest pain, palpitations and leg swelling.   Gastrointestinal:  Negative for abdominal distention, abdominal pain, anal bleeding, blood in stool, constipation, diarrhea, nausea, rectal pain and vomiting.   Endocrine: Negative for cold intolerance, heat intolerance, polydipsia, polyphagia and polyuria.   Genitourinary:  Negative for decreased urine volume, difficulty urinating, dysuria, enuresis, flank pain, frequency, genital sores, hematuria, penile discharge, penile pain, penile swelling, scrotal swelling, testicular pain and urgency.   Musculoskeletal:  Positive for gait problem. Negative for arthralgias, back pain, joint swelling, myalgias, neck pain and neck stiffness.   Skin:  Negative for color change, pallor, rash and wound.   Allergic/Immunologic: Negative for environmental allergies, food allergies and immunocompromised state.   Neurological:  Negative for dizziness, tremors, seizures, syncope, facial asymmetry, speech difficulty, weakness, light-headedness, numbness and headaches.   Hematological:  Negative for adenopathy. Does not bruise/bleed easily.   Psychiatric/Behavioral:  Positive for confusion, decreased concentration and dysphoric mood. Negative for agitation, behavioral problems, hallucinations, self-injury, sleep disturbance and suicidal ideas. The patient is not nervous/anxious and is not hyperactive.      Objective:      Physical Exam  Vitals reviewed.   Constitutional:       General: He is not in acute distress.     Appearance: He is well-developed. He is ill-appearing. He is not diaphoretic.   HENT:      Head: Normocephalic.       Right Ear: External ear normal.      Left Ear: External ear normal.      Nose: Nose normal.      Right Sinus: No maxillary sinus tenderness or frontal sinus tenderness.      Left Sinus: No maxillary sinus tenderness or frontal sinus tenderness.      Mouth/Throat:      Pharynx: No oropharyngeal exudate.   Eyes:      General: Lids are normal. No scleral icterus.        Right eye: No discharge.         Left eye: No discharge.      Extraocular Movements:      Right eye: Normal extraocular motion.      Left eye: Normal extraocular motion.      Conjunctiva/sclera:      Right eye: Right conjunctiva is not injected. No hemorrhage.     Left eye: Left conjunctiva is not injected. No hemorrhage.     Pupils: Pupils are equal, round, and reactive to light.   Neck:      Thyroid: No thyromegaly.      Vascular: No JVD.      Trachea: No tracheal deviation.   Cardiovascular:      Rate and Rhythm: Normal rate and regular rhythm.   Pulmonary:      Effort: Pulmonary effort is normal. No respiratory distress.      Breath sounds: No stridor.   Abdominal:      General: Bowel sounds are normal.      Palpations: Abdomen is soft. There is no hepatomegaly, splenomegaly or mass.      Tenderness: There is no abdominal tenderness.   Musculoskeletal:         General: No tenderness. Normal range of motion.      Cervical back: Normal range of motion and neck supple.   Lymphadenopathy:      Head:      Right side of head: No posterior auricular or occipital adenopathy.      Left side of head: No posterior auricular or occipital adenopathy.      Cervical: No cervical adenopathy.      Right cervical: No superficial, deep or posterior cervical adenopathy.     Left cervical: No superficial, deep or posterior cervical adenopathy.      Upper Body:      Right upper body: No supraclavicular adenopathy.      Left upper body: No supraclavicular adenopathy.   Skin:     General: Skin is dry.      Findings: No erythema or rash.      Nails: There is no clubbing.    Neurological:      Mental Status: He is alert and oriented to person, place, and time.      Cranial Nerves: No cranial nerve deficit.      Motor: Weakness present.      Coordination: Coordination abnormal.      Gait: Gait abnormal.   Psychiatric:         Behavior: Behavior normal.         Thought Content: Thought content normal.         Judgment: Judgment normal.           Assessment:      1. Acute pulmonary embolism, unspecified pulmonary embolism type, unspecified whether acute cor pulmonale present    2. Acute deep vein thrombosis (DVT) of distal vein of both lower extremities           Plan:     Unprovoked DVT.  D-dimer in normal range risk benefit ratio discussed of continuation of full-dose Eliquis 1st decrease Eliquis 2.5 mg p.o. b.i.d. for prevention of recurrent DVT this patient has decided to proceed with this remote supply sent pharmacy follow-up in 1 year myself or nurse practitioner.  With results of D-dimer at that time as well        Ahmet Gill Jr, MD FACP

## 2023-04-30 NOTE — ASSESSMENT & PLAN NOTE
- Reactive vs infectious etiology   -Blood cultures x 2  -UA is suggestive of UTI. Urine culture requested   -Rocephin initiated to cover UTI     lac  fell

## 2023-05-17 NOTE — ASSESSMENT & PLAN NOTE
Blood culture negative growth to date   IVFs   CXR unrevealing  UA culture negative growth to date   IV rocephin empirically   Likely reactive        (M6) obeys commands

## 2023-10-03 ENCOUNTER — TELEPHONE (OUTPATIENT)
Dept: HEMATOLOGY/ONCOLOGY | Facility: CLINIC | Age: 83
End: 2023-10-03
Payer: MEDICARE

## 2023-10-03 NOTE — TELEPHONE ENCOUNTER
Returned patient phone call and spouse answered. Reviewed date, time, and location for upcoming appointments. Patient spouse verbalized understanding and had no other issues at this time.

## 2023-10-03 NOTE — TELEPHONE ENCOUNTER
----- Message from France Franklin sent at 10/3/2023  9:34 AM CDT -----  Contact: Riley Lin is needing a call back in regards to discussing his upcoming appt. Please give him a call back at [phone]

## 2023-11-12 NOTE — ASSESSMENT & PLAN NOTE
Cared for by primary care team   SUBJECTIVE / OVERNIGHT EVENTS:  Today is hospital day 3d. There are no new issues or overnight events.   Denies any headache, lightheadedness, vertigo, shortness of breathe, cough, chest pain, palpitations, tachycardia, abdominal pain, nausea, vomiting, diarrhea or constipation currently    HPI:  80F with PMH of HTN, HLD, T2DM, ESRD on PD, breast cancer s/p chemotherapy, CAD s/p stents sent to the ED from outpatient Nephrologist office for abdominal bloating/distention.   Pt reports for the last several days she has been experiencing abdominal bloating. Denies any fevers, chills, abd pain, SOB, CP. Has been tolerating PD well.   She also reports several weeks of diffuse pruritis with excoriations all over her body. Has tried OTC topical creams with little improvement.   She otherwise denies having any other complaints.     Of note, patient follows with Dr. Dhaliwal in the outpatient setting. Pt was previously on HD and transitioned to PD in June/July of 2023.  PD catheter placed in June 2023 and had complication of bloody effluent, hypotension, and abdominal hematoma requiring ICU stay.     In the ED, afebrile HR 62, 114/57, 97% on RA. CT w/ No acute findings in the chest, abdomen, or pelvis. No pleural effusion and no ascites.  Patient admitted to medicine for further management.      (10 Nov 2023 10:15)    MEDICATIONS  (STANDING):  allopurinol 50 milliGRAM(s) Oral daily  anastrozole 1 milliGRAM(s) Oral daily  aspirin enteric coated 81 milliGRAM(s) Oral daily  atorvastatin 40 milliGRAM(s) Oral at bedtime  calcium acetate 1334 milliGRAM(s) Oral three times a day with meals  carvedilol 25 milliGRAM(s) Oral every 12 hours  clobetasol 0.05% Cream 1 Application(s) Topical two times a day  clopidogrel Tablet 75 milliGRAM(s) Oral daily  dextrose 5%. 1000 milliLiter(s) (100 mL/Hr) IV Continuous <Continuous>  dextrose 5%. 1000 milliLiter(s) (50 mL/Hr) IV Continuous <Continuous>  dextrose 50% Injectable 25 Gram(s) IV Push once  dextrose 50% Injectable 25 Gram(s) IV Push once  dextrose 50% Injectable 12.5 Gram(s) IV Push once  glucagon  Injectable 1 milliGRAM(s) IntraMuscular once  heparin   Injectable 5000 Unit(s) SubCutaneous every 12 hours  influenza  Vaccine (HIGH DOSE) 0.7 milliLiter(s) IntraMuscular once  insulin lispro (ADMELOG) corrective regimen sliding scale   SubCutaneous three times a day before meals  insulin lispro (ADMELOG) corrective regimen sliding scale   SubCutaneous at bedtime  levothyroxine 50 MICROGram(s) Oral daily  mupirocin 2% Ointment 1 Application(s) Topical two times a day  torsemide 10 milliGRAM(s) Oral at bedtime    MEDICATIONS  (PRN):  acetaminophen     Tablet .. 650 milliGRAM(s) Oral every 6 hours PRN Temp greater or equal to 38C (100.4F), Mild Pain (1 - 3)  calamine/zinc oxide Lotion 1 Application(s) Topical every 6 hours PRN Rash and/or Itching  dextrose Oral Gel 15 Gram(s) Oral once PRN Blood Glucose LESS THAN 70 milliGRAM(s)/deciliter  hydrOXYzine hydrochloride 25 milliGRAM(s) Oral two times a day PRN Itching  melatonin 3 milliGRAM(s) Oral at bedtime PRN Insomnia    HOME MEDICATIONS:  allopurinol 100 mg oral tablet: 0.5 tab(s) orally once a day (10 Nov 2023 10:05)  anastrozole 1 mg oral tablet: 1 tab(s) orally once a day (10 Nov 2023 10:05)  aspirin 81 mg oral delayed release tablet: 1 tab(s) orally once a day (10 Nov 2023 10:05)  atorvastatin 40 mg oral tablet: 1 tab(s) orally once a day (at bedtime) (10 Nov 2023 10:05)  calcium acetate 667 mg oral capsule: 2 cap(s) orally 3 times a day (10 Nov 2023 10:05)  carvedilol 25 mg oral tablet: 1 tab(s) orally every 12 hours (10 Nov 2023 10:05)  clopidogrel 75 mg oral tablet: 1 tab(s) orally once a day (10 Nov 2023 10:05)  levothyroxine 50 mcg (0.05 mg) oral tablet: 1 tab(s) orally once a day (10 Nov 2023 10:05)  metoclopramide 10 mg oral tablet: 1 tab(s) orally every 8 hours As needed for nausea (10 Nov 2023 10:05)  omeprazole 40 mg oral delayed release capsule: 1 cap(s) orally once a day (10 Nov 2023 10:05)  torsemide 10 mg oral tablet: 1 tab(s) orally once a day (at bedtime) (10 Nov 2023 10:05)  Tradjenta 5 mg oral tablet: 1 tab(s) orally once a day (at bedtime) (10 Nov 2023 10:05)    PHYSICAL EXAM:  Vital Signs Last 24 Hrs  T(C): 36.4 (12 Nov 2023 09:30), Max: 37.1 (11 Nov 2023 23:35)  T(F): 97.6 (12 Nov 2023 09:30), Max: 98.8 (11 Nov 2023 23:35)  HR: 61 (12 Nov 2023 12:52) (56 - 62)  BP: 141/64 (12 Nov 2023 12:52) (106/62 - 173/70)  BP(mean): --  RR: 18 (12 Nov 2023 09:30) (17 - 18)  SpO2: 98% (12 Nov 2023 12:52) (94% - 99%)    Parameters below as of 12 Nov 2023 12:52  Patient On (Oxygen Delivery Method): room air      I&O's Summary    11 Nov 2023 07:01  -  12 Nov 2023 07:00  --------------------------------------------------------  IN: 450 mL / OUT: 0 mL / NET: 450 mL      CONSTITUTIONAL: Well-groomed, in no apparent distress  EYES: No conjunctival or scleral injection, non-icteric  ENMT: No external nasal lesions; Normal outer ears  NECK: Supple; Trachea midline  RESPIRATORY: Normal respiratory effort; lungs are clear to auscultation bilaterally without wheeze/rhonchi/rales  CARDIOVASCULAR: Regular rate and rhythm, normal S1 and S2, no murmur/rub/gallop; No lower extremity edema  GASTROINTESTINAL: Non-distended; No palpable masses; No tenderness; No rebound/guarding  MUSCULOSKELETAL:  Normal gait;  NEUROLOGY: A+O to person, place, and time; no gross motor deficits   PSYCHIATRY: Mood and Affect appropriate    LABS:                        7.5    11.35 )-----------( 235      ( 12 Nov 2023 14:26 )             23.7     11-12    145  |  105  |  60<H>  ----------------------------<  135<H>  3.8   |  24  |  7.27<H>    Ca    7.5<L>      12 Nov 2023 14:26  Phos  6.4     11-11    TPro  5.9<L>  /  Alb  2.8<L>  /  TBili  0.2  /  DBili  x   /  AST  19  /  ALT  6<L>  /  AlkPhos  64  11-12          Urinalysis Basic - ( 12 Nov 2023 14:26 )    Color: x / Appearance: x / SG: x / pH: x  Gluc: 135 mg/dL / Ketone: x  / Bili: x / Urobili: x   Blood: x / Protein: x / Nitrite: x   Leuk Esterase: x / RBC: x / WBC x   Sq Epi: x / Non Sq Epi: x / Bacteria: x        Culture - Dialysis Fluid (collected 10 Nov 2023 14:21)  Source: .Peritoneal Dialysis Fluid  Preliminary Report (11 Nov 2023 16:34):    No growth to date.          RADIOLOGY & ADDITIONAL TESTS:  EKG  12 Lead ECG:   Ventricular Rate 57 BPM    Atrial Rate 57 BPM    P-R Interval 200 ms    QRS Duration 70 ms    Q-T Interval 482 ms    QTC Calculation(Bazett) 469 ms    P Axis 9 degrees    R Axis 10 degrees    T Axis 0 degrees    Diagnosis Line SINUS BRADYCARDIA  LOW VOLTAGE QRS  BORDERLINE ECG  WHEN COMPARED WITH ECG OF 09-OCT-2023 12:33,  NO SIGNIFICANT CHANGE WAS FOUND  Confirmed by NJ Mack Zlata ([id number]) on 11/10/2023 7:22:05 PM (11-09-23 @ 18:30)  12 Lead ECG:   Ventricular Rate 69 BPM    Atrial Rate 69 BPM    P-R Interval 208 ms    QRS Duration 94 ms    Q-T Interval 386 ms    QTC Calculation(Bazett) 413 ms    P Axis -8 degrees    R Axis 44 degrees    T Axis 39 degrees    Diagnosis Line NORMAL SINUS RHYTHM  NORMAL ECG  WHEN COMPARED WITH ECG OF 25-JUN-2021 12:39,  NO SIGNIFICANT CHANGE WAS FOUND  Confirmed by FLORIN LOUIS MD ([id number]) on 10/10/2023 4:08:47 PM (10-09-23 @ 12:33)    CT Chest No Cont:   ACC: [id number] EXAM:  CT ABDOMEN AND PELVIS   ORDERED BY: TRESSA TABARES     ACC: [id number] EXAM:  CT CHEST   ORDERED BY: TRESSA RAYSHAWN     PROCEDURE DATE:  11/09/2023          INTERPRETATION:  CLINICAL INFORMATION: Abdominal distention and shortness   of breath, patient on peritoneal dialysis.    COMPARISON: CT abdomen and pelvis 7/3/2023. PET/CT 12/31/2022. Chest CT   12/29/2020.    CONTRAST/COMPLICATIONS:  IV Contrast: NONE  Oral Contrast: NONE  Complications: None reported at time of study completion    PROCEDURE:  CT of the Chest, Abdomen and Pelvis was performed.  Sagittal and coronal reformats were performed.    FINDINGS:  CHEST:  LUNGS AND LARGE AIRWAYS: Patent central airways. Linear atelectasis   versus scarring of the right middle lobe, bilateral lower lobes, and   lingula.  PLEURA: No pleural effusion.  VESSELS: Left-sided port terminates near the superior cavoatrial   junction. Aortic and coronary artery calcifications.  HEART: Cardiomegaly. No pericardial effusion. Mitral annular and aortic   valve leaflet calcifications.  MEDIASTINUM AND CHARLOTTE: No lymphadenopathy.  CHEST WALL AND LOWER NECK: 1.5 cm right thyroid nodule.    ABDOMEN AND PELVIS:  LIVER: Within normal limits.  BILE DUCTS: Normal caliber.  GALLBLADDER: Contracted.  SPLEEN: Within normal limits.  PANCREAS: Within normal limits.  ADRENALS: Within normal limits.  KIDNEYS/URETERS: Atrophic kidneys. Bilateral renal cysts, including a 3.1   cm exophytic left lower pole renal cyst. No hydronephrosis.    BLADDER: Within normal limits.  REPRODUCTIVE ORGANS: Hysterectomy.    BOWEL: No bowel obstruction. Appendix is normal. Colonic diverticulosis.   Small hiatal hernia.  PERITONEUM: No ascites. Peritoneal dialysis catheter in the right lower   quadrant.  VESSELS: Atherosclerotic changes.  RETROPERITONEUM/LYMPH NODES: No lymphadenopathy.  ABDOMINAL WALL: Postsurgical changes.  BONES: Degenerative changes. Grade 1 anterolisthesis of L4 on L5.    IMPRESSION:  No acute findings in the chest, abdomen, or pelvis on this limited   noncontrast study.    No pleural effusion and no ascites.        --- End of Report ---           ANICETO SUE MD; Resident Radiologist  This document has been electronically signed.   ЕКАТЕРИНА ALLEN MD; Attending Radiologist  This document has beenelectronically signed. Nov 9 2023  9:10PM (11-09-23 @ 20:37)

## 2023-11-21 ENCOUNTER — LAB VISIT (OUTPATIENT)
Dept: LAB | Facility: HOSPITAL | Age: 83
End: 2023-11-21
Attending: INTERNAL MEDICINE
Payer: MEDICARE

## 2023-11-21 DIAGNOSIS — I26.99 ACUTE PULMONARY EMBOLISM, UNSPECIFIED PULMONARY EMBOLISM TYPE, UNSPECIFIED WHETHER ACUTE COR PULMONALE PRESENT: ICD-10-CM

## 2023-11-21 LAB — D DIMER PPP IA.FEU-MCNC: 0.26 MG/L FEU

## 2023-11-21 PROCEDURE — 36415 COLL VENOUS BLD VENIPUNCTURE: CPT | Performed by: INTERNAL MEDICINE

## 2023-11-21 PROCEDURE — 85379 FIBRIN DEGRADATION QUANT: CPT | Performed by: INTERNAL MEDICINE

## 2023-11-22 ENCOUNTER — OFFICE VISIT (OUTPATIENT)
Dept: HEMATOLOGY/ONCOLOGY | Facility: CLINIC | Age: 83
End: 2023-11-22
Payer: MEDICARE

## 2023-11-22 VITALS
WEIGHT: 150.81 LBS | BODY MASS INDEX: 20.43 KG/M2 | OXYGEN SATURATION: 97 % | TEMPERATURE: 98 F | HEART RATE: 63 BPM | HEIGHT: 72 IN | RESPIRATION RATE: 20 BRPM | DIASTOLIC BLOOD PRESSURE: 74 MMHG | SYSTOLIC BLOOD PRESSURE: 115 MMHG

## 2023-11-22 DIAGNOSIS — G30.1 SEVERE LATE ONSET ALZHEIMER'S DEMENTIA WITH OTHER BEHAVIORAL DISTURBANCE: ICD-10-CM

## 2023-11-22 DIAGNOSIS — K56.41 FECAL IMPACTION IN RECTUM: ICD-10-CM

## 2023-11-22 DIAGNOSIS — I82.4Z3 ACUTE DEEP VEIN THROMBOSIS (DVT) OF DISTAL VEIN OF BOTH LOWER EXTREMITIES: ICD-10-CM

## 2023-11-22 DIAGNOSIS — I26.99 ACUTE PULMONARY EMBOLISM, UNSPECIFIED PULMONARY EMBOLISM TYPE, UNSPECIFIED WHETHER ACUTE COR PULMONALE PRESENT: Primary | ICD-10-CM

## 2023-11-22 DIAGNOSIS — F02.C18 SEVERE LATE ONSET ALZHEIMER'S DEMENTIA WITH OTHER BEHAVIORAL DISTURBANCE: ICD-10-CM

## 2023-11-22 PROCEDURE — 99999 PR PBB SHADOW E&M-EST. PATIENT-LVL IV: CPT | Mod: PBBFAC,,, | Performed by: INTERNAL MEDICINE

## 2023-11-22 PROCEDURE — 99214 PR OFFICE/OUTPT VISIT, EST, LEVL IV, 30-39 MIN: ICD-10-PCS | Mod: S$PBB,,, | Performed by: INTERNAL MEDICINE

## 2023-11-22 PROCEDURE — 99999 PR PBB SHADOW E&M-EST. PATIENT-LVL IV: ICD-10-PCS | Mod: PBBFAC,,, | Performed by: INTERNAL MEDICINE

## 2023-11-22 PROCEDURE — 99214 OFFICE O/P EST MOD 30 MIN: CPT | Mod: PBBFAC | Performed by: INTERNAL MEDICINE

## 2023-11-22 PROCEDURE — 99214 OFFICE O/P EST MOD 30 MIN: CPT | Mod: S$PBB,,, | Performed by: INTERNAL MEDICINE

## 2023-11-22 NOTE — PROGRESS NOTES
Subjective:       Patient ID: Riley Saba is a 83 y.o. male.    Chief Complaint: Results and Coagulation Disorder    HPI:  83-YEAR-OLD MALE HISTORY OF UNPROVOKED PULMONARY EMBOLUS.  THE PATIENT AND I HAVE DECIDED TO PROCEED WITH ELIQUIS 2.5 MG P.O. B.I.D. FOR PREVENTION.  PATIENT RETURNS FOR FOLLOW-UP  Past Medical History:   Diagnosis Date    Alzheimer's disease, unspecified (CODE)     BPH without obstruction/lower urinary tract symptoms     Constipation     HLD (hyperlipidemia)     Orthostatic hypotension     Supraventricular tachycardia      No family history on file.  Social History     Socioeconomic History    Marital status:    Tobacco Use    Smoking status: Never    Smokeless tobacco: Never     No past surgical history on file.    Labs:  Lab Results   Component Value Date    WBC 11.69 08/31/2022    HGB 11.7 (L) 08/31/2022    HCT 35.2 (L) 08/31/2022    MCV 89 08/31/2022     08/31/2022     BMP  Lab Results   Component Value Date     (L) 10/18/2022    K 4.4 10/18/2022    CL 96 (L) 10/18/2022    CO2 24 10/18/2022    BUN 21 10/18/2022    CREATININE 0.94 10/18/2022    CALCIUM 9.0 10/18/2022    ANIONGAP 8 10/18/2022    ESTGFRAFRICA 82 10/18/2022    EGFRNONAA >60 07/31/2022     Lab Results   Component Value Date    ALT 14 08/31/2022    AST 18 08/31/2022    ALKPHOS 76 08/31/2022    BILITOT 0.6 08/31/2022       Lab Results   Component Value Date    IRON 46 08/31/2022    TIBC 340 08/31/2022    FERRITIN 426 (H) 08/31/2022     Lab Results   Component Value Date    SSIHOKAW35 570 02/24/2020     Lab Results   Component Value Date    FOLATE 5.3 (L) 06/17/2022     Lab Results   Component Value Date    TSH 2.21 06/20/2023         Review of Systems   Constitutional:  Negative for activity change, appetite change, chills, diaphoresis, fatigue, fever and unexpected weight change.   HENT:  Negative for congestion, dental problem, drooling, ear discharge, ear pain, facial swelling, hearing loss, mouth  sores, nosebleeds, postnasal drip, rhinorrhea, sinus pressure, sneezing, sore throat, tinnitus, trouble swallowing and voice change.    Eyes:  Negative for photophobia, pain, discharge, redness, itching and visual disturbance.   Respiratory:  Negative for apnea, cough, choking, chest tightness, shortness of breath, wheezing and stridor.    Cardiovascular:  Negative for chest pain, palpitations and leg swelling.   Gastrointestinal:  Negative for abdominal distention, abdominal pain, anal bleeding, blood in stool, constipation, diarrhea, nausea, rectal pain and vomiting.   Endocrine: Negative for cold intolerance, heat intolerance, polydipsia, polyphagia and polyuria.   Genitourinary:  Negative for decreased urine volume, difficulty urinating, dysuria, enuresis, flank pain, frequency, genital sores, hematuria, penile discharge, penile pain, penile swelling, scrotal swelling, testicular pain and urgency.   Musculoskeletal:  Negative for arthralgias, back pain, gait problem, joint swelling, myalgias, neck pain and neck stiffness.   Skin:  Negative for color change, pallor, rash and wound.   Allergic/Immunologic: Negative for environmental allergies, food allergies and immunocompromised state.   Neurological:  Negative for dizziness, tremors, seizures, syncope, facial asymmetry, speech difficulty, weakness, light-headedness, numbness and headaches.   Hematological:  Negative for adenopathy. Does not bruise/bleed easily.   Psychiatric/Behavioral:  Positive for confusion and dysphoric mood. Negative for agitation, behavioral problems, decreased concentration, hallucinations, self-injury, sleep disturbance and suicidal ideas. The patient is not nervous/anxious and is not hyperactive.        Objective:      Physical Exam  Vitals reviewed.   Constitutional:       General: He is not in acute distress.     Appearance: He is well-developed. He is not diaphoretic.   HENT:      Head: Normocephalic.      Right Ear: External ear  normal.      Left Ear: External ear normal.      Nose: Nose normal.      Right Sinus: No maxillary sinus tenderness or frontal sinus tenderness.      Left Sinus: No maxillary sinus tenderness or frontal sinus tenderness.      Mouth/Throat:      Pharynx: No oropharyngeal exudate.   Eyes:      General: Lids are normal. No scleral icterus.        Right eye: No discharge.         Left eye: No discharge.      Extraocular Movements:      Right eye: Normal extraocular motion.      Left eye: Normal extraocular motion.      Conjunctiva/sclera:      Right eye: Right conjunctiva is not injected. No hemorrhage.     Left eye: Left conjunctiva is not injected. No hemorrhage.     Pupils: Pupils are equal, round, and reactive to light.   Neck:      Thyroid: No thyromegaly.      Vascular: No JVD.      Trachea: No tracheal deviation.   Cardiovascular:      Rate and Rhythm: Normal rate.   Pulmonary:      Effort: Pulmonary effort is normal. No respiratory distress.      Breath sounds: No stridor.   Abdominal:      General: Bowel sounds are normal.      Palpations: Abdomen is soft. There is no hepatomegaly, splenomegaly or mass.      Tenderness: There is no abdominal tenderness.   Musculoskeletal:         General: No tenderness. Normal range of motion.      Cervical back: Normal range of motion and neck supple.   Lymphadenopathy:      Head:      Right side of head: No posterior auricular or occipital adenopathy.      Left side of head: No posterior auricular or occipital adenopathy.      Cervical: No cervical adenopathy.      Right cervical: No superficial, deep or posterior cervical adenopathy.     Left cervical: No superficial, deep or posterior cervical adenopathy.      Upper Body:      Right upper body: No supraclavicular adenopathy.      Left upper body: No supraclavicular adenopathy.   Skin:     General: Skin is dry.      Findings: No erythema or rash.      Nails: There is no clubbing.   Neurological:      Mental Status: He is  alert and oriented to person, place, and time.      Cranial Nerves: No cranial nerve deficit.      Coordination: Coordination normal.   Psychiatric:         Cognition and Memory: Cognition is impaired. Memory is impaired. He exhibits impaired recent memory.             Assessment:      1. Acute pulmonary embolism, unspecified pulmonary embolism type, unspecified whether acute cor pulmonale present    2. Fecal impaction in rectum    3. Acute deep vein thrombosis (DVT) of distal vein of both lower extremities    4. 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   Follow up with LUCY    Infusion scheduling note    Injection scheduling note    Labs    Imaging    Pharmacy appointment    Other referrals                   Plan:     UNPROVOKED PULMONARY EMBOLUS DECISION MADE TO CONTINUE WITH ELIQUIS 2.5 MG DAILY CAN BE SEEN IN FOLLOW-UP WITH A PCP.  IF PATIENT NEEDS TO HAVE PROCEDURE DONE STOP ELIQUIS 48 HOURS PRIOR IN RESUME 24 HOURS LATER.  DISCUSSED IMPLICATIONS ANSWERED QUESTIONS MADE TO GI AT THEIR REQUEST FOR RECURRENT FECAL IMPACTION        Ahmet Gill Jr, MD FACP

## 2025-04-10 ENCOUNTER — HOSPITAL ENCOUNTER (EMERGENCY)
Facility: HOSPITAL | Age: 85
Discharge: HOME | End: 2025-04-11
Attending: EMERGENCY MEDICINE
Payer: MEDICARE

## 2025-04-10 DIAGNOSIS — R53.1 GENERALIZED WEAKNESS: Primary | ICD-10-CM

## 2025-04-10 DIAGNOSIS — F02.C18 SEVERE LATE ONSET ALZHEIMER'S DEMENTIA WITH OTHER BEHAVIORAL DISTURBANCE: ICD-10-CM

## 2025-04-10 DIAGNOSIS — G30.1 SEVERE LATE ONSET ALZHEIMER'S DEMENTIA WITH OTHER BEHAVIORAL DISTURBANCE: ICD-10-CM

## 2025-04-10 DIAGNOSIS — R53.1 WEAKNESS GENERALIZED: ICD-10-CM

## 2025-04-10 DIAGNOSIS — K59.00 CONSTIPATION: ICD-10-CM

## 2025-04-10 LAB
ABSOLUTE EOSINOPHIL (OHS): 0.1 K/UL
ABSOLUTE MONOCYTE (OHS): 0.64 K/UL (ref 0.3–1)
ABSOLUTE NEUTROPHIL COUNT (OHS): 4.89 K/UL (ref 1.8–7.7)
ALBUMIN SERPL BCP-MCNC: 3.9 G/DL (ref 3.5–5.2)
ALP SERPL-CCNC: 71 UNIT/L (ref 40–150)
ALT SERPL W/O P-5'-P-CCNC: 11 UNIT/L (ref 10–44)
ANION GAP (OHS): 8 MMOL/L (ref 8–16)
AST SERPL-CCNC: 22 UNIT/L (ref 11–45)
BACTERIA #/AREA URNS AUTO: ABNORMAL /HPF
BASOPHILS # BLD AUTO: 0.04 K/UL
BASOPHILS NFR BLD AUTO: 0.6 %
BILIRUB SERPL-MCNC: 1.6 MG/DL (ref 0.1–1)
BILIRUB UR QL STRIP.AUTO: NEGATIVE
BNP SERPL-MCNC: 452 PG/ML (ref 0–99)
BUN SERPL-MCNC: 21 MG/DL (ref 8–23)
CALCIUM SERPL-MCNC: 9.4 MG/DL (ref 8.7–10.5)
CHLORIDE SERPL-SCNC: 104 MMOL/L (ref 95–110)
CLARITY UR: CLEAR
CO2 SERPL-SCNC: 27 MMOL/L (ref 23–29)
COLOR UR AUTO: ABNORMAL
CREAT SERPL-MCNC: 1 MG/DL (ref 0.5–1.4)
ERYTHROCYTE [DISTWIDTH] IN BLOOD BY AUTOMATED COUNT: 13 % (ref 11.5–14.5)
GFR SERPLBLD CREATININE-BSD FMLA CKD-EPI: >60 ML/MIN/1.73/M2
GLUCOSE SERPL-MCNC: 99 MG/DL (ref 70–110)
GLUCOSE UR QL STRIP: NEGATIVE
HCT VFR BLD AUTO: 40 % (ref 40–54)
HGB BLD-MCNC: 13.3 GM/DL (ref 14–18)
HGB UR QL STRIP: ABNORMAL
HYALINE CASTS UR QL AUTO: 0 /LPF (ref 0–1)
IMM GRANULOCYTES # BLD AUTO: 0.02 K/UL (ref 0–0.04)
IMM GRANULOCYTES NFR BLD AUTO: 0.3 % (ref 0–0.5)
KETONES UR QL STRIP: ABNORMAL
LEUKOCYTE ESTERASE UR QL STRIP: ABNORMAL
LYMPHOCYTES # BLD AUTO: 0.92 K/UL (ref 1–4.8)
MCH RBC QN AUTO: 31.6 PG (ref 27–31)
MCHC RBC AUTO-ENTMCNC: 33.3 G/DL (ref 32–36)
MCV RBC AUTO: 95 FL (ref 82–98)
MICROSCOPIC COMMENT: ABNORMAL
NITRITE UR QL STRIP: NEGATIVE
NUCLEATED RBC (/100WBC) (OHS): 0 /100 WBC
PH UR STRIP: 7 [PH]
PLATELET # BLD AUTO: 137 K/UL (ref 150–450)
PMV BLD AUTO: 11.4 FL (ref 9.2–12.9)
POTASSIUM SERPL-SCNC: 4.3 MMOL/L (ref 3.5–5.1)
PROT SERPL-MCNC: 6.5 GM/DL (ref 6–8.4)
PROT UR QL STRIP: ABNORMAL
RBC # BLD AUTO: 4.21 M/UL (ref 4.6–6.2)
RBC #/AREA URNS AUTO: >100 /HPF (ref 0–4)
RELATIVE EOSINOPHIL (OHS): 1.5 %
RELATIVE LYMPHOCYTE (OHS): 13.9 % (ref 18–48)
RELATIVE MONOCYTE (OHS): 9.7 % (ref 4–15)
RELATIVE NEUTROPHIL (OHS): 74 % (ref 38–73)
SODIUM SERPL-SCNC: 139 MMOL/L (ref 136–145)
SP GR UR STRIP: 1.02
TROPONIN I SERPL DL<=0.01 NG/ML-MCNC: 0.02 NG/ML
UROBILINOGEN UR STRIP-ACNC: NEGATIVE EU/DL
WBC # BLD AUTO: 6.61 K/UL (ref 3.9–12.7)
WBC #/AREA URNS AUTO: 24 /HPF (ref 0–5)

## 2025-04-10 PROCEDURE — 85025 COMPLETE CBC W/AUTO DIFF WBC: CPT | Performed by: EMERGENCY MEDICINE

## 2025-04-10 PROCEDURE — 93010 ELECTROCARDIOGRAM REPORT: CPT | Mod: ,,, | Performed by: INTERNAL MEDICINE

## 2025-04-10 PROCEDURE — 83880 ASSAY OF NATRIURETIC PEPTIDE: CPT | Performed by: EMERGENCY MEDICINE

## 2025-04-10 PROCEDURE — 99285 EMERGENCY DEPT VISIT HI MDM: CPT | Mod: 25

## 2025-04-10 PROCEDURE — 81001 URINALYSIS AUTO W/SCOPE: CPT | Performed by: EMERGENCY MEDICINE

## 2025-04-10 PROCEDURE — 87086 URINE CULTURE/COLONY COUNT: CPT | Performed by: EMERGENCY MEDICINE

## 2025-04-10 PROCEDURE — 84484 ASSAY OF TROPONIN QUANT: CPT | Performed by: EMERGENCY MEDICINE

## 2025-04-10 PROCEDURE — 51702 INSERT TEMP BLADDER CATH: CPT

## 2025-04-10 PROCEDURE — 80053 COMPREHEN METABOLIC PANEL: CPT | Performed by: EMERGENCY MEDICINE

## 2025-04-10 PROCEDURE — 25000003 PHARM REV CODE 250: Performed by: EMERGENCY MEDICINE

## 2025-04-10 PROCEDURE — 93005 ELECTROCARDIOGRAM TRACING: CPT

## 2025-04-10 RX ORDER — PSEUDOEPHEDRINE/ACETAMINOPHEN 30MG-500MG
100 TABLET ORAL
Status: COMPLETED | OUTPATIENT
Start: 2025-04-10 | End: 2025-04-11

## 2025-04-10 RX ORDER — SYRING-NEEDL,DISP,INSUL,0.3 ML 29 G X1/2"
296 SYRINGE, EMPTY DISPOSABLE MISCELLANEOUS
Status: COMPLETED | OUTPATIENT
Start: 2025-04-10 | End: 2025-04-11

## 2025-04-10 RX ORDER — SODIUM CHLORIDE 0.9 G/100ML
500 IRRIGANT IRRIGATION
Status: COMPLETED | OUTPATIENT
Start: 2025-04-10 | End: 2025-04-10

## 2025-04-10 RX ADMIN — SODIUM CHLORIDE 500 ML: 0.9 IRRIGANT IRRIGATION at 11:04

## 2025-04-11 VITALS
OXYGEN SATURATION: 99 % | SYSTOLIC BLOOD PRESSURE: 113 MMHG | RESPIRATION RATE: 18 BRPM | DIASTOLIC BLOOD PRESSURE: 70 MMHG | TEMPERATURE: 97 F | HEART RATE: 69 BPM

## 2025-04-11 PROCEDURE — 25000003 PHARM REV CODE 250: Performed by: EMERGENCY MEDICINE

## 2025-04-11 RX ADMIN — MAGNESIUM CITRATE 296 ML: 1.75 LIQUID ORAL at 12:04

## 2025-04-11 RX ADMIN — Medication 100 ML: at 12:04

## 2025-04-11 NOTE — CONSULTS
Sw consulted on pt due to not being able to complete ADL's at home. Sw spoke with wife who was at bedside that stated pt has become very weak and it is hard for him to walk and move around without assistance. Pt requested home health with physical therapy because that worked for the patient in the past. Iram spoke with patient's PCP Bj Clayton MD nurse, Lizzie at [facility] who advise patient needs to be seen in office before they could issue orders for HH. Iram spoke with wife and advised her of the information provided.

## 2025-04-11 NOTE — ED PROVIDER NOTES
SCRIBE #1 NOTE: I, Dequan Gay, am scribing for, and in the presence of, Danette Oviedo MD. I have scribed the entire note.       History     Chief Complaint   Patient presents with    Fatigue     Patient presents with complaints of weakness. Per EMS, recent discharge from [facility] and concerns that patient isn't able to get to f/u appointments because of generalize weakness.     Review of patient's allergies indicates:   Allergen Reactions    Neuromuscular blockers, steroidal Nausea And Vomiting         History of Present Illness     HPI    4/10/2025, 10:12 PM  History obtained from the medical records and wife due to pt being nonverbal      History of Present Illness: Riley Saba is a 85 y.o. male patient with a PMHx of Alzheimer's disease, dementia, constipation, HLD, orthostatic hypotension, SVT, acute PE, DVT, UTI, and intraabdominal hemorrhage who presents to the Emergency Department for evaluation of constipation which began 10-12 days ago. Wife reports pt had a large bowel movement 2 days ago, but he weak and fatigued afterwards. Pt reports she usually gives the pt Magnesium Citrate, Milk of Magnesia, and suppositories every 2 days to help with his constipation, but forgot to give it him for about 6 days. Wife states pt does not drink a lot of water. Pt was seen at [facility] yesterday and had a CT scan done, showing large amount of stool and urinary retention. Symptoms are constant and moderate in severity. No mitigating or exacerbating factors reported. Wife states pt had these sxs 6 months ago. Wife denies any diarrhea, fever, emesis, cough, or decreased appetite from pt. Wife reports pt ambulates with a walker or with help from her. Wife reports that the pt's PCP recently told her that the pt had a partial bowel obstruction. Prior Tx includes Magnesium Citrate, Milk of Magnesia, and suppositories. No further complaints or concerns at this time.       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      Family History:  No family history on file.    Social History:  Social History     Tobacco Use    Smoking status: Never    Smokeless tobacco: Never   Substance and Sexual Activity    Alcohol use: Not on file    Drug use: Not on file    Sexual activity: Not on file        Review of Systems     Review of Systems   Unable to perform ROS: Dementia   Constitutional:  Positive for fatigue. Negative for appetite change and fever.   Respiratory:  Negative for cough.    Gastrointestinal:  Positive for constipation. Negative for diarrhea and vomiting.   Neurological:  Positive for weakness.        Physical Exam     Initial Vitals [04/10/25 2048]   BP Pulse Resp Temp SpO2   102/62 90 20 97 °F (36.1 °C) 98 %      MAP       --          Physical Exam  Nursing Notes and Vital Signs Reviewed.  Constitutional: Patient is in no apparent distress. Pt is elderly.  Head: Atraumatic. Normocephalic.  Eyes: PERRL. EOM intact. Conjunctivae are not pale. No scleral icterus.  ENT: Mucous membranes are moist. Oropharynx is clear and symmetric.    Neck: Supple. Full ROM. No lymphadenopathy.  Cardiovascular: Regular rate. Regular rhythm. No murmurs, rubs, or gallops. Distal pulses are 2+ and symmetric.  Pulmonary/Chest: No respiratory distress. Clear to auscultation bilaterally. No wheezing or rales.  Abdominal: Soft and non-distended.  There is no tenderness.  No rebound, guarding, or rigidity. Good bowel sounds.  Genitourinary: No CVA tenderness.  Musculoskeletal: Moves all extremities. No obvious deformities. No edema. No calf tenderness.  Skin: Warm and dry.  Neurological:  Alert, awake, and appropriate.  Pt is  nonverbal. Per wife, pt is baseline due to dementia. No acute focal neurological deficits are appreciated. No focal weakness.  Psychiatric: Normal affect. Good eye contact. Appropriate in content.     ED Course   Procedures  ED Vital Signs:  Vitals:    04/10/25 2048 04/10/25 2215 04/10/25 2300 04/10/25 2322   BP: 102/62 132/68 128/60 (!) 157/74   Pulse: 90 70 69 75   Resp: 20 19 18 16   Temp: 97 °F (36.1 °C)      TempSrc: Oral      SpO2: 98% 98% 97% 98%    04/11/25 0130 04/11/25 0145 04/11/25 0200 04/11/25 0230   BP: (!) 83/52 114/60 (!) 140/79 125/74   Pulse: 65 66 86 71   Resp: 18 14 (!) 21 19   Temp:       TempSrc:       SpO2: 96% 97% 96% 98%    04/11/25 0300 04/11/25 0330 04/11/25 0400 04/11/25 0430   BP: (!) 103/58 106/61 114/66 113/70   Pulse: 69 68 69 69   Resp: 15 18 15 18   Temp:       TempSrc:       SpO2: 99% 96% 97% 99%       Abnormal Lab Results:  Labs Reviewed   COMPREHENSIVE METABOLIC PANEL - Abnormal       Result Value    Sodium 139      Potassium 4.3      Chloride 104      CO2 27      Glucose 99      BUN 21      Creatinine 1.0      Calcium 9.4      Protein Total 6.5      Albumin 3.9      Bilirubin Total 1.6 (*)     ALP 71      AST 22      ALT 11      Anion Gap 8      eGFR >60     B-TYPE NATRIURETIC PEPTIDE - Abnormal     (*)    URINALYSIS, REFLEX TO URINE CULTURE - Abnormal    Color, UA Orange (*)     Appearance, UA Clear      pH, UA 7.0      Spec Grav UA 1.025      Protein, UA 2+ (*)     Glucose, UA Negative      Ketones, UA 1+ (*)     Bilirubin, UA Negative      Blood, UA 3+ (*)     Nitrites, UA Negative      Urobilinogen, UA Negative      Leukocyte Esterase, UA 1+ (*)    CBC WITH DIFFERENTIAL - Abnormal    WBC 6.61      RBC 4.21 (*)     HGB 13.3 (*)     HCT 40.0      MCV 95      MCH 31.6 (*)     MCHC 33.3      RDW 13.0      Platelet Count 137 (*)     MPV 11.4      Nucleated RBC 0      Neut % 74.0 (*)     Lymph % 13.9 (*)     Mono % 9.7      Eos % 1.5      Basophil % 0.6      Imm Grans % 0.3       Neut # 4.89      Lymph # 0.92 (*)     Mono # 0.64      Eos # 0.10      Baso # 0.04      Imm Grans # 0.02     URINALYSIS MICROSCOPIC - Abnormal    RBC, UA >100 (*)     WBC, UA 24 (*)     Bacteria, UA Rare      Hyaline Casts, UA 0      Microscopic Comment       TROPONIN I - Normal    Troponin-I 0.018     CULTURE, URINE   CBC W/ AUTO DIFFERENTIAL    Narrative:     The following orders were created for panel order CBC auto differential.  Procedure                               Abnormality         Status                     ---------                               -----------         ------                     CBC with Differential[[id number]]       Abnormal            Final result                 Please view results for these tests on the individual orders.        All Lab Results:  Results for orders placed or performed during the hospital encounter of 04/10/25   Comprehensive metabolic panel    Collection Time: 04/10/25  9:35 PM   Result Value Ref Range    Sodium 139 136 - 145 mmol/L    Potassium 4.3 3.5 - 5.1 mmol/L    Chloride 104 95 - 110 mmol/L    CO2 27 23 - 29 mmol/L    Glucose 99 70 - 110 mg/dL    BUN 21 8 - 23 mg/dL    Creatinine 1.0 0.5 - 1.4 mg/dL    Calcium 9.4 8.7 - 10.5 mg/dL    Protein Total 6.5 6.0 - 8.4 gm/dL    Albumin 3.9 3.5 - 5.2 g/dL    Bilirubin Total 1.6 (H) 0.1 - 1.0 mg/dL    ALP 71 40 - 150 unit/L    AST 22 11 - 45 unit/L    ALT 11 10 - 44 unit/L    Anion Gap 8 8 - 16 mmol/L    eGFR >60 >60 mL/min/1.73/m2   Troponin I    Collection Time: 04/10/25  9:35 PM   Result Value Ref Range    Troponin-I 0.018 <=0.026 ng/mL   CBC with Differential    Collection Time: 04/10/25  9:35 PM   Result Value Ref Range    WBC 6.61 3.90 - 12.70 K/uL    RBC 4.21 (L) 4.60 - 6.20 M/uL    HGB 13.3 (L) 14.0 - 18.0 gm/dL    HCT 40.0 40.0 - 54.0 %    MCV 95 82 - 98 fL    MCH 31.6 (H) 27.0 - 31.0 pg    MCHC 33.3 32.0 - 36.0 g/dL    RDW 13.0 11.5 - 14.5 %    Platelet Count 137 (L) 150 - 450 K/uL    MPV 11.4 9.2 - 12.9 fL     Nucleated RBC 0 <=0 /100 WBC    Neut % 74.0 (H) 38 - 73 %    Lymph % 13.9 (L) 18 - 48 %    Mono % 9.7 4 - 15 %    Eos % 1.5 <=8 %    Basophil % 0.6 <=1.9 %    Imm Grans % 0.3 0.0 - 0.5 %    Neut # 4.89 1.8 - 7.7 K/uL    Lymph # 0.92 (L) 1 - 4.8 K/uL    Mono # 0.64 0.3 - 1 K/uL    Eos # 0.10 <=0.5 K/uL    Baso # 0.04 <=0.2 K/uL    Imm Grans # 0.02 0.00 - 0.04 K/uL   Brain natriuretic peptide    Collection Time: 04/10/25  9:36 PM   Result Value Ref Range     (H) 0 - 99 pg/mL   Urinalysis, Reflex to Urine Culture    Collection Time: 04/10/25  9:36 PM    Specimen: Urine   Result Value Ref Range    Color, UA Orange (A) Straw, Yamilet, Yellow, Light-Orange    Appearance, UA Clear Clear    pH, UA 7.0 5.0 - 8.0    Spec Grav UA 1.025 1.005 - 1.030    Protein, UA 2+ (A) Negative    Glucose, UA Negative Negative    Ketones, UA 1+ (A) Negative    Bilirubin, UA Negative Negative    Blood, UA 3+ (A) Negative    Nitrites, UA Negative Negative    Urobilinogen, UA Negative <2.0 EU/dL    Leukocyte Esterase, UA 1+ (A) Negative   Urinalysis Microscopic    Collection Time: 04/10/25  9:36 PM   Result Value Ref Range    RBC, UA >100 (H) 0 - 4 /HPF    WBC, UA 24 (H) 0 - 5 /HPF    Bacteria, UA Rare None, Rare, Occasional /HPF    Hyaline Casts, UA 0 0 - 1 /LPF    Microscopic Comment         Imaging Results:  Imaging Results              CT Head Without Contrast (In process)                      X-Ray Abdomen AP 1 View (KUB) (Final result)  Result time 04/10/25 22:47:04      Final result by Crissy Bauer MD (04/10/25 22:47:04)                   Impression:     Moderate colonic stool    Finalized on: 4/10/2025 10:47 PM By:  Crissy Bauer MD  [facility]# [id number]      2025-04-10 22:49:10.370     [facility]               Narrative:    EXAM: XR ABDOMEN AP 1 VIEW    CLINICAL HISTORY: Constipation    FINDINGS:  No air distended bowel on supine imaging.  There is moderate colonic stool greater proximally                                        Type of Interpretation: Outside Written Report  STAT Radiology Procedure Done:  Ct head without intravenous contrast  Interpretation:  No acute abnormality.  Radiologist:  Jj Shi MD  4/11/2025  3:22           The EKG was ordered, reviewed, and independently interpreted by the ED provider.  Interpretation time: 21:47  Rate: 73 BPM  Rhythm: normal sinus rhythm  Interpretation: Nonspecific ST abnormality. No STEMI.         The Emergency Provider reviewed the vital signs and test results, which are outlined above.     ED Discussion     5:46 AM: Reassessed pt at this time. Discussed with patient and/or family/caretaker all pertinent ED information and results. Discussed pt dx and plan of tx. Gave the patient and spouse all f/u and return to the ED instructions. All questions and concerns were addressed at this time. Patient and/or family/caretaker expresses understanding of information and instructions, and is comfortable with plan to discharge. Pt is stable for discharge.     I discussed with patient and/or family/caretaker that evaluation in the ED does not suggest any emergent or life threatening medical conditions requiring immediate intervention beyond what was provided in the ED, and I believe patient is safe for discharge.  Regardless, an unremarkable evaluation in the ED does not preclude the development or presence of a serious of life threatening condition. As such, I instructed that the patient is to return immediately for any worsening or change in current symptoms.       Medical Decision Making  Amount and/or Complexity of Data Reviewed  Labs: ordered. Decision-making details documented in ED Course.  Radiology: ordered. Decision-making details documented in ED Course.  ECG/medicine tests: ordered and independent interpretation performed. Decision-making details documented in ED Course.    Risk  OTC drugs.  Prescription drug management.                ED Medication(s):  Medications   glycerin 99.5%  topical solution 100 mL (100 mLs Rectal Given 4/11/25 0032)     And   magnesium citrate solution 296 mL (296 mLs Rectal Given 4/11/25 0032)     And   sodium chloride 0.9% irrigation 500 mL (500 mLs Rectal Given 4/10/25 2300)       New Prescriptions    No medications on file        Follow-up Information       Bj Clayton MD In 1 day.    Specialty: Internal Medicine  Contact information:  [address] [facility] [address] [phone]               [facility] - Emergency Dept..    Specialty: Emergency Medicine  Why: As needed, If symptoms worsen  Contact information:  [address] [facility] [address] [phone]                               Scribe Attestation:   Scribe #1: I performed the above scribed service and the documentation accurately describes the services I performed. I attest to the accuracy of the note.     Attending:   Physician Attestation Statement for Scribe #1: I, Danette Oviedo MD, personally performed the services described in this documentation, as scribed by Dequan Gay, in my presence, and it is both accurate and complete.           Clinical Impression       ICD-10-CM ICD-9-CM   1. Weakness generalized  R53.1 780.79   2. Constipation  K59.00 564.00       Disposition:   Disposition: Discharged  Condition: Stable        Danette Oviedo MD  04/11/25 0173

## 2025-04-12 LAB — BACTERIA UR CULT: NO GROWTH

## 2025-04-13 LAB
OHS QRS DURATION: 98 MS
OHS QTC CALCULATION: 438 MS

## 2025-05-23 ENCOUNTER — TELEPHONE (OUTPATIENT)
Facility: CLINIC | Age: 85
End: 2025-05-23
Payer: MEDICARE

## 2025-05-23 ENCOUNTER — TELEPHONE (OUTPATIENT)
Dept: UROLOGY | Facility: CLINIC | Age: 85
End: 2025-05-23
Payer: MEDICARE

## 2025-05-23 NOTE — TELEPHONE ENCOUNTER
Attempt to reach out to pt's wife but was unsuccessful. LVM      Copied from CRM #[id number]. Topic: Appointments - Same Day Access  >> May 23, 2025 10:19 AM David wrote:  .Type:  Same Day Appointment Request    Caller is requesting a same day appointment.  Caller declined first available appointment listed below.    Name of Caller: Stacie/ Wife  When is the first available appointment? 06/12  Symptoms: Haven't passed urine in 12 hours/ slight swelling on 05/22/ discomforted   Best Call Back Number: [phone]  Additional Information:  Stacie states she had a message sent this morning and has not gotten a return call to discuss concerns

## 2025-05-23 NOTE — TELEPHONE ENCOUNTER
Outgoing call placed to patient in regards to scheduling an new patient appointment with Dr. Renteria. Patient does not want to schedule at this time due to Dr. Renteria not having a sooner appointment at preferred location. Will follow up with PCP for further assistance.

## 2025-06-23 ENCOUNTER — EXTERNAL HOME HEALTH (OUTPATIENT)
Dept: HOME HEALTH SERVICES | Facility: HOSPITAL | Age: 85
End: 2025-06-23
Payer: MEDICARE